# Patient Record
Sex: MALE | HISPANIC OR LATINO | Employment: OTHER
[De-identification: names, ages, dates, MRNs, and addresses within clinical notes are randomized per-mention and may not be internally consistent; named-entity substitution may affect disease eponyms.]

---

## 2017-01-03 ENCOUNTER — CHARTING TRANS (OUTPATIENT)
Dept: OTHER | Age: 67
End: 2017-01-03

## 2017-01-04 ENCOUNTER — CHARTING TRANS (OUTPATIENT)
Dept: OTHER | Age: 67
End: 2017-01-04

## 2017-01-09 ENCOUNTER — LAB SERVICES (OUTPATIENT)
Dept: OTHER | Age: 67
End: 2017-01-09

## 2017-01-09 LAB
DIFFERENTIAL TYPE: ABNORMAL
EST. AVERAGE GLUCOSE BLD GHB EST-MCNC: 242 MG/DL (ref 0–154)
HBA1C MFR BLD: 10.1 % (ref 4.2–6)
HEMATOCRIT: 36.5 % (ref 40–51)
HEMOGLOBIN: 11.8 G/DL (ref 13.7–17.5)
MEAN CORPUSCULAR HGB CONCENTRATION: 32.3 % (ref 32–36)
MEAN CORPUSCULAR HGB: 22.6 PG (ref 27–34)
MEAN CORPUSCULAR VOLUME: 70.1 FL (ref 79–95)
MEAN PLATELET VOLUME: 9.9 FL (ref 8.6–12.4)
PLATELET COUNT: 228 10*3/UL (ref 150–400)
RED BLOOD CELL COUNT: 5.21 10*6/UL (ref 3.9–5.7)
RED CELL DISTRIBUTION WIDTH: 22.1 % (ref 11.3–14.8)
WHITE BLOOD CELL COUNT: 6.2 10*3/UL (ref 4–10)

## 2017-01-10 ENCOUNTER — CHARTING TRANS (OUTPATIENT)
Dept: OTHER | Age: 67
End: 2017-01-10

## 2017-01-10 ENCOUNTER — MYAURORA ACCOUNT LINK (OUTPATIENT)
Dept: OTHER | Age: 67
End: 2017-01-10

## 2017-01-10 ENCOUNTER — CHARTING TRANS (OUTPATIENT)
Dept: URGENT CARE | Age: 67
End: 2017-01-10

## 2017-01-10 ENCOUNTER — IMAGING SERVICES (OUTPATIENT)
Dept: OTHER | Age: 67
End: 2017-01-10

## 2017-01-10 ASSESSMENT — PAIN SCALES - GENERAL: PAINLEVEL_OUTOF10: 10

## 2017-01-16 ENCOUNTER — CHARTING TRANS (OUTPATIENT)
Dept: GASTROENTEROLOGY | Age: 67
End: 2017-01-16

## 2017-01-18 ENCOUNTER — MYAURORA ACCOUNT LINK (OUTPATIENT)
Dept: OTHER | Age: 67
End: 2017-01-18

## 2017-01-18 ENCOUNTER — CHARTING TRANS (OUTPATIENT)
Dept: URGENT CARE | Age: 67
End: 2017-01-18

## 2017-01-18 ENCOUNTER — CHARTING TRANS (OUTPATIENT)
Dept: OTHER | Age: 67
End: 2017-01-18

## 2017-01-18 ENCOUNTER — IMAGING SERVICES (OUTPATIENT)
Dept: OTHER | Age: 67
End: 2017-01-18

## 2017-01-18 ASSESSMENT — PAIN SCALES - GENERAL: PAINLEVEL_OUTOF10: 9

## 2017-01-27 ENCOUNTER — CHARTING TRANS (OUTPATIENT)
Dept: OTHER | Age: 67
End: 2017-01-27

## 2017-01-27 ENCOUNTER — IMAGING SERVICES (OUTPATIENT)
Dept: OTHER | Age: 67
End: 2017-01-27

## 2017-01-27 ENCOUNTER — LAB SERVICES (OUTPATIENT)
Dept: OTHER | Age: 67
End: 2017-01-27

## 2017-01-27 LAB
CREATININE, SERUM: 1 MG/DL (ref 0.4–1.4)
GFR AFRICAN AMERICAN: NORMAL ML/MIN/1.73SQ.M.
GFR NON AFRICAN AMER: >60 ML/MIN/1.73SQ.M.

## 2017-02-01 ENCOUNTER — CHARTING TRANS (OUTPATIENT)
Dept: OTHER | Age: 67
End: 2017-02-01

## 2017-02-01 ENCOUNTER — CHARTING TRANS (OUTPATIENT)
Dept: UROLOGY | Age: 67
End: 2017-02-01

## 2017-02-02 ENCOUNTER — CHARTING TRANS (OUTPATIENT)
Dept: OTHER | Age: 67
End: 2017-02-02

## 2017-02-13 ENCOUNTER — CHARTING TRANS (OUTPATIENT)
Dept: OTHER | Age: 67
End: 2017-02-13

## 2017-02-21 ENCOUNTER — CHARTING TRANS (OUTPATIENT)
Dept: OTHER | Age: 67
End: 2017-02-21

## 2017-02-22 ENCOUNTER — CHARTING TRANS (OUTPATIENT)
Dept: OTHER | Age: 67
End: 2017-02-22

## 2017-02-23 ENCOUNTER — CHARTING TRANS (OUTPATIENT)
Dept: OTHER | Age: 67
End: 2017-02-23

## 2017-02-27 ENCOUNTER — BH HISTORICAL (OUTPATIENT)
Dept: OTHER | Age: 67
End: 2017-02-27

## 2017-02-28 ENCOUNTER — CHARTING TRANS (OUTPATIENT)
Dept: INTERNAL MEDICINE | Age: 67
End: 2017-02-28

## 2017-02-28 ENCOUNTER — CHARTING TRANS (OUTPATIENT)
Dept: OTHER | Age: 67
End: 2017-02-28

## 2017-02-28 ENCOUNTER — LAB SERVICES (OUTPATIENT)
Dept: OTHER | Age: 67
End: 2017-02-28

## 2017-02-28 LAB
BASOPHIL %: 0.3 % (ref 0–1.2)
BASOPHIL ABSOLUTE #: 0 10*3/UL (ref 0–0.1)
BILIRUBIN URINE: NEGATIVE
BLOOD URINE: NEGATIVE
BUN SERPL-MCNC: 11 MG/DL (ref 6–27)
CALCIUM SERPL-MCNC: 9.3 MG/DL (ref 8.6–10.6)
CHLORIDE SERPL-SCNC: 95 MMOL/L (ref 96–107)
CLARITY: CLEAR
COLOR: YELLOW
CREATININE, SERUM: 0.9 MG/DL (ref 0.6–1.6)
DIFFERENTIAL TYPE: ABNORMAL
EOSINOPHIL %: 1.7 % (ref 0–10)
EOSINOPHIL ABSOLUTE #: 0.1 10*3/UL (ref 0–0.5)
GFR SERPL CREATININE-BSD FRML MDRD: >60 ML/MIN/{1.73M2}
GFR SERPL CREATININE-BSD FRML MDRD: >60 ML/MIN/{1.73M2}
GLUCOSE QUALITATIVE U: >=500
GLUCOSE SERPL-MCNC: 281 MG/DL (ref 70–200)
HCO3 SERPL-SCNC: 28 MMOL/L (ref 22–32)
HEMATOCRIT: 32.3 % (ref 40–51)
HEMOGLOBIN: 10.4 G/DL (ref 13.7–17.5)
KETONES, URINE: NEGATIVE
LEUKOCYTE ESTERASE URINE: NEGATIVE
LYMPH PERCENT: 21.5 % (ref 20.5–51.1)
LYMPHOCYTE ABSOLUTE #: 1.4 10*3/UL (ref 1.2–3.4)
MEAN CORPUSCULAR HGB CONCENTRATION: 32.2 % (ref 32–36)
MEAN CORPUSCULAR HGB: 23.2 PG (ref 27–34)
MEAN CORPUSCULAR VOLUME: 71.9 FL (ref 79–95)
MEAN PLATELET VOLUME: 9.9 FL (ref 8.6–12.4)
MONOCYTE ABSOLUTE #: 0.8 10*3/UL (ref 0.2–0.9)
MONOCYTE PERCENT: 11.6 % (ref 4.3–12.9)
NEUTROPHIL ABSOLUTE #: 4.3 10*3/UL (ref 1.4–6.5)
NEUTROPHIL PERCENT: 64.9 % (ref 34–73.5)
NITRITE URINE: NEGATIVE
PH URINE: 8 (ref 5–7)
PLATELET COUNT: 262 10*3/UL (ref 150–400)
POTASSIUM SERPL-SCNC: 4.7 MMOL/L (ref 3.5–5.3)
RED BLOOD CELL COUNT: 4.49 10*6/UL (ref 3.9–5.7)
RED CELL DISTRIBUTION WIDTH: 21.4 % (ref 11.3–14.8)
SODIUM SERPL-SCNC: 131 MMOL/L (ref 136–146)
SPECIFIC GRAVITY URINE: 1.01 (ref 1–1.03)
URINE PROTEIN, QUAL (DIPSTICK): NEGATIVE
UROBILINOGEN URINE: <2
WHITE BLOOD CELL COUNT: 6.6 10*3/UL (ref 4–10)

## 2017-03-01 LAB — FINAL REPORT: NORMAL

## 2017-03-02 ENCOUNTER — CHARTING TRANS (OUTPATIENT)
Dept: OTHER | Age: 67
End: 2017-03-02

## 2017-03-03 ENCOUNTER — CHARTING TRANS (OUTPATIENT)
Dept: OTHER | Age: 67
End: 2017-03-03

## 2017-03-06 ENCOUNTER — CHARTING TRANS (OUTPATIENT)
Dept: OTHER | Age: 67
End: 2017-03-06

## 2017-03-06 ENCOUNTER — LAB SERVICES (OUTPATIENT)
Dept: OTHER | Age: 67
End: 2017-03-06

## 2017-03-06 LAB — BEDSIDE GLUCOSE: 209 MG/DL (ref 70–110)

## 2017-03-07 ENCOUNTER — CHARTING TRANS (OUTPATIENT)
Dept: OTHER | Age: 67
End: 2017-03-07

## 2017-03-08 ENCOUNTER — CHARTING TRANS (OUTPATIENT)
Dept: OTHER | Age: 67
End: 2017-03-08

## 2017-03-10 ENCOUNTER — CHARTING TRANS (OUTPATIENT)
Dept: OTHER | Age: 67
End: 2017-03-10

## 2017-03-13 ENCOUNTER — CHARTING TRANS (OUTPATIENT)
Dept: OTHER | Age: 67
End: 2017-03-13

## 2017-03-14 ENCOUNTER — CHARTING TRANS (OUTPATIENT)
Dept: CARDIOLOGY | Age: 67
End: 2017-03-14

## 2017-03-14 ENCOUNTER — CHARTING TRANS (OUTPATIENT)
Dept: OTHER | Age: 67
End: 2017-03-14

## 2017-03-15 ENCOUNTER — CHARTING TRANS (OUTPATIENT)
Dept: OTHER | Age: 67
End: 2017-03-15

## 2017-03-16 ENCOUNTER — CHARTING TRANS (OUTPATIENT)
Dept: OTHER | Age: 67
End: 2017-03-16

## 2017-03-17 ENCOUNTER — CHARTING TRANS (OUTPATIENT)
Dept: OTHER | Age: 67
End: 2017-03-17

## 2017-03-19 ENCOUNTER — CHARTING TRANS (OUTPATIENT)
Dept: OTHER | Age: 67
End: 2017-03-19

## 2017-03-20 ENCOUNTER — CHARTING TRANS (OUTPATIENT)
Dept: OTHER | Age: 67
End: 2017-03-20

## 2017-03-21 ENCOUNTER — CHARTING TRANS (OUTPATIENT)
Dept: OTHER | Age: 67
End: 2017-03-21

## 2017-03-22 ENCOUNTER — CHARTING TRANS (OUTPATIENT)
Dept: UROLOGY | Age: 67
End: 2017-03-22

## 2017-03-22 ENCOUNTER — CHARTING TRANS (OUTPATIENT)
Dept: OTHER | Age: 67
End: 2017-03-22

## 2017-03-23 ENCOUNTER — BH HISTORICAL (OUTPATIENT)
Dept: OTHER | Age: 67
End: 2017-03-23

## 2017-03-23 ENCOUNTER — CHARTING TRANS (OUTPATIENT)
Dept: OTHER | Age: 67
End: 2017-03-23

## 2017-03-23 ENCOUNTER — LAB SERVICES (OUTPATIENT)
Dept: OTHER | Age: 67
End: 2017-03-23

## 2017-03-23 LAB
ALBUMIN SERPL BCG-MCNC: 3.8 G/DL (ref 3.6–5.1)
ALP SERPL-CCNC: 86 U/L (ref 30–130)
ALT SERPL W/O P-5'-P-CCNC: 25 U/L (ref 10–35)
AST SERPL-CCNC: 12 U/L (ref 9–37)
BILIRUB SERPL-MCNC: 0.2 MG/DL (ref 0–1)
BUN SERPL-MCNC: 13 MG/DL (ref 6–27)
CALCIUM SERPL-MCNC: 9.2 MG/DL (ref 8.6–10.6)
CHLORIDE SERPL-SCNC: 95 MMOL/L (ref 96–107)
CHOLEST SERPL-MCNC: 117 MG/DL (ref 140–200)
CREATININE, SERUM: 0.9 MG/DL (ref 0.6–1.6)
DIFFERENTIAL TYPE: ABNORMAL
EST. AVERAGE GLUCOSE BLD GHB EST-MCNC: 243 MG/DL (ref 0–154)
GFR SERPL CREATININE-BSD FRML MDRD: >60 ML/MIN/{1.73M2}
GFR SERPL CREATININE-BSD FRML MDRD: >60 ML/MIN/{1.73M2}
GLUCOSE P FAST SERPL-MCNC: 242 MG/DL (ref 60–100)
HBA1C MFR BLD: 10.1 % (ref 4.2–6)
HCO3 SERPL-SCNC: 26 MMOL/L (ref 22–32)
HDLC SERPL-MCNC: 77 MG/DL
HEMATOCRIT: 27.7 % (ref 40–51)
HEMOGLOBIN: 8.7 G/DL (ref 13.7–17.5)
LDLC SERPL CALC-MCNC: 14 MG/DL (ref 0–100)
MEAN CORPUSCULAR HGB CONCENTRATION: 31.4 % (ref 32–36)
MEAN CORPUSCULAR HGB: 23 PG (ref 27–34)
MEAN CORPUSCULAR VOLUME: 73.1 FL (ref 79–95)
MEAN PLATELET VOLUME: 9.6 FL (ref 8.6–12.4)
PLATELET COUNT: 300 10*3/UL (ref 150–400)
POTASSIUM SERPL-SCNC: 3.9 MMOL/L (ref 3.5–5.3)
PROT SERPL-MCNC: 6.2 G/DL (ref 6.2–8.1)
RED BLOOD CELL COUNT: 3.79 10*6/UL (ref 3.9–5.7)
RED CELL DISTRIBUTION WIDTH: 20.8 % (ref 11.3–14.8)
SODIUM SERPL-SCNC: 135 MMOL/L (ref 136–146)
TRIGL SERPL-MCNC: 129 MG/DL (ref 0–200)
TSH SERPL DL<=0.05 MIU/L-ACNC: 1.84 M[IU]/L (ref 0.3–4.82)
WHITE BLOOD CELL COUNT: 9 10*3/UL (ref 4–10)

## 2017-03-27 ENCOUNTER — LAB SERVICES (OUTPATIENT)
Dept: OTHER | Age: 67
End: 2017-03-27

## 2017-03-27 ENCOUNTER — CHARTING TRANS (OUTPATIENT)
Dept: OTHER | Age: 67
End: 2017-03-27

## 2017-03-27 ENCOUNTER — CHARTING TRANS (OUTPATIENT)
Dept: ALLERGY | Age: 67
End: 2017-03-27

## 2017-03-27 LAB
A/G RATIO: 1.23 (ref 1.1–2.4)
ALANINE AMINOTRANSFE: 29 U/L (ref 7–52)
ALBUMIN, SERUM (ALB): 3.2 G/DL (ref 3.5–5.7)
ALKALINE PHOSPHATASE (ALK): 78 U/L (ref 34–104)
ANION GAP: 6 MEQ/L (ref 8–16)
ASPARTATE AMINOTRANS: 10 U/L (ref 13–39)
BASOPHIL AUTOMATED: 0.1 %
BASOPHILS: 0 (ref 0–0.2)
BILIRUBIN, TOTAL: 0.4 MG/DL (ref 0.2–0.8)
BLOOD UREA NITROGEN (BUN): 16 MG/DL (ref 7–25)
BUN/CREATININE RATIO: 16.8 (ref 7.4–23)
C3 SERPL-MCNC: 110 MG/DL (ref 88–165)
C4 SERPL-MCNC: 28.4 MG/DL (ref 14–44)
CALCIUM, SERUM: 8.6 MG/DL (ref 8.6–10.3)
CARBON DIOXIDE: 29 MMOL/L (ref 21–31)
CHLORIDE, SERUM: 97 MMOL/L (ref 98–107)
CREATININE: 0.95 MG/DL (ref 0.7–1.3)
EOSINOPHIL AUTOMATED: 0.6 %
EOSINOPHILS: 0 (ref 0–0.5)
EST GLOMERULAR FILTRATION RATE: > 60 1.73M SQ
GLUCOSE P FAST SERPL-MCNC: 330 MG/DL (ref 70–99)
HEMATOCRIT: 28.3 % (ref 38.6–49.2)
HEMOGLOBIN: 8.7 GM/DL (ref 13–17)
K (POTASSIUM, SERUM): 3.8 MMOL/L (ref 3.5–5.1)
LYMPHOCYTE AUTOMATED: 30.2 %
LYMPHOCYTES: 2.3 (ref 0.6–3.4)
MEAN CORPUSCULAR HGB: 23 PG (ref 26–34)
MEAN CORPUSCULAR HGB: 30.7 G/DL (ref 32.5–35.8)
MEAN CORPUSCULAR VOL: 74.8 FL (ref 80–100)
MEAN PLATELET VOLUME: 7.6 FL (ref 6.8–10.2)
MONOCYTE AUTOMATED: 7.9 %
MONOCYTES: 0.6 (ref 0.3–1)
NA (SODIUM, SERUM): 132 MMOL/L (ref 133–144)
NEUTROPHIL ABSOLUTE: 4.7 (ref 1.7–7.7)
NEUTROPHIL AUTOMATED: 61.2 %
PLATELET COUNT: 253 K/MM3 (ref 150–450)
PROTEIN TOTAL: 5.8 G/DL (ref 6.4–8.9)
RED BLOOD CELL COUNT: 3.78 M/MM3 (ref 4.34–5.6)
RED CELL DISTRIBUTIO: 21 % (ref 11.9–15.9)
SEDIMENTATION RATE, RBC: 19 MM/H (ref 0–10)
WHITE BLOOD CELL COU: 7.7 K/MM3 (ref 4–11)

## 2017-03-28 LAB
ANA SER QL IA: NORMAL RATIO (ref 0–0.6)
DSDNA IGG SERPL IA-ACNC: NORMAL [IU]/ML (ref 0–10)

## 2017-03-29 ENCOUNTER — CHARTING TRANS (OUTPATIENT)
Dept: OTHER | Age: 67
End: 2017-03-29

## 2017-03-29 LAB
C1INH SERPL-MCNC: 36 MG/DL (ref 21–39)
THYROGLOB AB SERPL-ACNC: <0.9 IU/ML (ref 0–4)
THYROPEROXIDASE AB SERPL-ACNC: <28 UNITS/ML

## 2017-03-30 ENCOUNTER — IMAGING SERVICES (OUTPATIENT)
Dept: OTHER | Age: 67
End: 2017-03-30

## 2017-03-30 ENCOUNTER — CHARTING TRANS (OUTPATIENT)
Dept: OTHER | Age: 67
End: 2017-03-30

## 2017-03-31 LAB — TRYPTASE SERPL-MCNC: 6.5 UG/L

## 2017-04-02 LAB — C1INH ACT/NOR SERPL: 108 %

## 2017-04-03 ENCOUNTER — CHARTING TRANS (OUTPATIENT)
Dept: OTHER | Age: 67
End: 2017-04-03

## 2017-04-04 ENCOUNTER — CHARTING TRANS (OUTPATIENT)
Dept: OTHER | Age: 67
End: 2017-04-04

## 2017-04-05 ENCOUNTER — LAB SERVICES (OUTPATIENT)
Dept: OTHER | Age: 67
End: 2017-04-05

## 2017-04-05 ENCOUNTER — CHARTING TRANS (OUTPATIENT)
Dept: OTHER | Age: 67
End: 2017-04-05

## 2017-04-05 ENCOUNTER — CHARTING TRANS (OUTPATIENT)
Dept: ALLERGY | Age: 67
End: 2017-04-05

## 2017-04-05 LAB
DIFFERENTIAL TYPE: ABNORMAL
HEMATOCRIT: 29 % (ref 40–51)
HEMOGLOBIN: 9.1 G/DL (ref 13.7–17.5)
IRON SATN MFR SERPL: 11 % (ref 13–59)
IRON SERPL-MCNC: 37 UG/DL (ref 49–181)
MEAN CORPUSCULAR HGB CONCENTRATION: 31.4 % (ref 32–36)
MEAN CORPUSCULAR HGB: 23.9 PG (ref 27–34)
MEAN CORPUSCULAR VOLUME: 76.1 FL (ref 79–95)
MEAN PLATELET VOLUME: 9.1 FL (ref 8.6–12.4)
PLATELET COUNT: 224 10*3/UL (ref 150–400)
RED BLOOD CELL COUNT: 3.81 10*6/UL (ref 3.9–5.7)
RED CELL DISTRIBUTION WIDTH: 23.9 % (ref 11.3–14.8)
RETICULOCYTE # (X 10^6): 0.22 10*6/UL
RETICULOCYTE %: 5.71 % (ref 0.6–2.6)
TIBC SERPL-MCNC: 329 UG/DL (ref 250–450)
WHITE BLOOD CELL COUNT: 7.8 10*3/UL (ref 4–10)

## 2017-04-07 ENCOUNTER — CHARTING TRANS (OUTPATIENT)
Dept: OTHER | Age: 67
End: 2017-04-07

## 2017-04-07 ENCOUNTER — CHARTING TRANS (OUTPATIENT)
Dept: INTERNAL MEDICINE | Age: 67
End: 2017-04-07

## 2017-04-07 LAB
C1Q SERPL-MCNC: 12 UG/ML
HGB A1 MFR BLD ELPH: 97.9 % (ref 96.4–98.2)
HGB A2 MFR BLD ELPH: 2.1 % (ref 1.8–3.4)
HGB C: NORMAL %
HGB F: NORMAL % (ref 0–2)
HGB FRACT BLD ELPH-IMP: NORMAL
HGB S: NORMAL %
OTHER: NORMAL %
PATHOLOGIST NAME: NORMAL

## 2017-04-11 ENCOUNTER — CHARTING TRANS (OUTPATIENT)
Dept: ENDOCRINOLOGY | Age: 67
End: 2017-04-11

## 2017-04-17 ENCOUNTER — CHARTING TRANS (OUTPATIENT)
Dept: OTHER | Age: 67
End: 2017-04-17

## 2017-04-17 ENCOUNTER — CHARTING TRANS (OUTPATIENT)
Dept: HEMATOLOGY/ONCOLOGY | Age: 67
End: 2017-04-17

## 2017-04-17 ASSESSMENT — PAIN SCALES - GENERAL: PAINLEVEL_OUTOF10: 0

## 2017-04-18 ENCOUNTER — IMAGING SERVICES (OUTPATIENT)
Dept: OTHER | Age: 67
End: 2017-04-18

## 2017-04-18 ENCOUNTER — CHARTING TRANS (OUTPATIENT)
Dept: OTHER | Age: 67
End: 2017-04-18

## 2017-04-18 ENCOUNTER — CHARTING TRANS (OUTPATIENT)
Dept: CARDIOLOGY | Age: 67
End: 2017-04-18

## 2017-04-24 ENCOUNTER — CHARTING TRANS (OUTPATIENT)
Dept: OTHER | Age: 67
End: 2017-04-24

## 2017-04-26 ENCOUNTER — CHARTING TRANS (OUTPATIENT)
Dept: ALLERGY | Age: 67
End: 2017-04-26

## 2017-04-26 ENCOUNTER — IMAGING SERVICES (OUTPATIENT)
Dept: OTHER | Age: 67
End: 2017-04-26

## 2017-04-27 ENCOUNTER — CHARTING TRANS (OUTPATIENT)
Dept: OTHER | Age: 67
End: 2017-04-27

## 2017-05-01 ENCOUNTER — CHARTING TRANS (OUTPATIENT)
Dept: OTHER | Age: 67
End: 2017-05-01

## 2017-05-02 ENCOUNTER — CHARTING TRANS (OUTPATIENT)
Dept: OTHER | Age: 67
End: 2017-05-02

## 2017-05-05 ENCOUNTER — CHARTING TRANS (OUTPATIENT)
Dept: ALLERGY | Age: 67
End: 2017-05-05

## 2017-05-05 ENCOUNTER — MYAURORA ACCOUNT LINK (OUTPATIENT)
Dept: OTHER | Age: 67
End: 2017-05-05

## 2017-05-24 ENCOUNTER — CHARTING TRANS (OUTPATIENT)
Dept: OTHER | Age: 67
End: 2017-05-24

## 2017-05-30 ENCOUNTER — CHARTING TRANS (OUTPATIENT)
Dept: OTHER | Age: 67
End: 2017-05-30

## 2017-06-05 ENCOUNTER — CHARTING TRANS (OUTPATIENT)
Dept: OTHER | Age: 67
End: 2017-06-05

## 2017-06-05 ENCOUNTER — MYAURORA ACCOUNT LINK (OUTPATIENT)
Dept: OTHER | Age: 67
End: 2017-06-05

## 2017-06-06 ENCOUNTER — CHARTING TRANS (OUTPATIENT)
Dept: ALLERGY | Age: 67
End: 2017-06-06

## 2017-06-07 ENCOUNTER — CHARTING TRANS (OUTPATIENT)
Dept: OTHER | Age: 67
End: 2017-06-07

## 2017-06-13 ENCOUNTER — CHARTING TRANS (OUTPATIENT)
Dept: OTHER | Age: 67
End: 2017-06-13

## 2017-06-14 ENCOUNTER — CHARTING TRANS (OUTPATIENT)
Dept: OTHER | Age: 67
End: 2017-06-14

## 2017-06-14 ENCOUNTER — LAB SERVICES (OUTPATIENT)
Dept: OTHER | Age: 67
End: 2017-06-14

## 2017-06-14 LAB
DIFFERENTIAL TYPE: ABNORMAL
FERRITIN SERPL-MCNC: 14 NG/ML (ref 18–464)
HEMATOCRIT: 33 % (ref 40–51)
HEMOGLOBIN: 10.4 G/DL (ref 13.7–17.5)
IRON SATN MFR SERPL: 5 % (ref 13–59)
IRON SERPL-MCNC: 18 UG/DL (ref 49–181)
LYMPH PERCENT: 26.5 % (ref 20.5–51.1)
LYMPHOCYTE ABSOLUTE #: 1.5 10*3/UL (ref 1.2–3.4)
MEAN CORPUSCULAR HGB CONCENTRATION: 31.5 % (ref 32–36)
MEAN CORPUSCULAR HGB: 24.1 PG (ref 27–34)
MEAN CORPUSCULAR VOLUME: 76.6 FL (ref 79–95)
MEAN PLATELET VOLUME: 8 FL (ref 8.6–12.4)
MIXED %: 12.4 % (ref 4.3–12.9)
MIXED ABSOLUTE #: 0.7 10*3/UL (ref 0.2–0.9)
NEUTROPHIL ABSOLUTE #: 3.3 10*3/UL (ref 1.4–6.5)
NEUTROPHIL PERCENT: 61.1 % (ref 34–73.5)
PLATELET COUNT: 217 10*3/UL (ref 150–400)
RED BLOOD CELL COUNT: 4.31 10*6/UL (ref 3.9–5.7)
RED CELL DISTRIBUTION WIDTH: 18.1 % (ref 11.3–14.8)
TIBC SERPL-MCNC: 375 UG/DL (ref 250–450)
WHITE BLOOD CELL COUNT: 5.5 10*3/UL (ref 4–10)

## 2017-06-15 ENCOUNTER — CHARTING TRANS (OUTPATIENT)
Dept: OTHER | Age: 67
End: 2017-06-15

## 2017-06-16 ENCOUNTER — CHARTING TRANS (OUTPATIENT)
Dept: OTHER | Age: 67
End: 2017-06-16

## 2017-06-21 ENCOUNTER — CHARTING TRANS (OUTPATIENT)
Dept: UROLOGY | Age: 67
End: 2017-06-21

## 2017-06-22 ENCOUNTER — CHARTING TRANS (OUTPATIENT)
Dept: OTHER | Age: 67
End: 2017-06-22

## 2017-06-23 ENCOUNTER — CHARTING TRANS (OUTPATIENT)
Dept: OTHER | Age: 67
End: 2017-06-23

## 2017-06-26 ENCOUNTER — CHARTING TRANS (OUTPATIENT)
Dept: OTHER | Age: 67
End: 2017-06-26

## 2017-06-26 ENCOUNTER — MYAURORA ACCOUNT LINK (OUTPATIENT)
Dept: OTHER | Age: 67
End: 2017-06-26

## 2017-07-06 ENCOUNTER — CHARTING TRANS (OUTPATIENT)
Dept: OTHER | Age: 67
End: 2017-07-06

## 2017-07-07 ENCOUNTER — CHARTING TRANS (OUTPATIENT)
Dept: OTHER | Age: 67
End: 2017-07-07

## 2017-07-10 ENCOUNTER — LAB SERVICES (OUTPATIENT)
Dept: OTHER | Age: 67
End: 2017-07-10

## 2017-07-10 ENCOUNTER — CHARTING TRANS (OUTPATIENT)
Dept: INTERNAL MEDICINE | Age: 67
End: 2017-07-10

## 2017-07-10 LAB
BASOPHIL %: 0.4 % (ref 0–1.2)
BASOPHIL ABSOLUTE #: 0 10*3/UL (ref 0–0.1)
BILIRUBIN URINE: NEGATIVE
BLOOD URINE: NEGATIVE
CLARITY: CLEAR
COLOR: YELLOW
DIFFERENTIAL TYPE: ABNORMAL
EOSINOPHIL %: 1.4 % (ref 0–10)
EOSINOPHIL ABSOLUTE #: 0.1 10*3/UL (ref 0–0.5)
EST. AVERAGE GLUCOSE BLD GHB EST-MCNC: 182 MG/DL (ref 0–154)
FERRITIN SERPL-MCNC: 16 NG/ML (ref 18–464)
GLUCOSE QUALITATIVE U: 150
HBA1C MFR BLD: 8 % (ref 4.2–6)
HEMATOCRIT: 36.6 % (ref 40–51)
HEMOGLOBIN: 12.2 G/DL (ref 13.7–17.5)
IRON SATN MFR SERPL: 5 % (ref 13–59)
IRON SERPL-MCNC: 19 UG/DL (ref 49–181)
KETONES, URINE: NEGATIVE
LEUKOCYTE ESTERASE URINE: NEGATIVE
LYMPH PERCENT: 26.4 % (ref 20.5–51.1)
LYMPHOCYTE ABSOLUTE #: 1.3 10*3/UL (ref 1.2–3.4)
MEAN CORPUSCULAR HGB CONCENTRATION: 33.3 % (ref 32–36)
MEAN CORPUSCULAR HGB: 24.7 PG (ref 27–34)
MEAN CORPUSCULAR VOLUME: 74.1 FL (ref 79–95)
MEAN PLATELET VOLUME: 9.7 FL (ref 8.6–12.4)
MONOCYTE ABSOLUTE #: 0.6 10*3/UL (ref 0.2–0.9)
MONOCYTE PERCENT: 11.7 % (ref 4.3–12.9)
NEUTROPHIL ABSOLUTE #: 3 10*3/UL (ref 1.4–6.5)
NEUTROPHIL PERCENT: 60.1 % (ref 34–73.5)
NITRITE URINE: NEGATIVE
PH URINE: 7 (ref 5–7)
PLATELET COUNT: 195 10*3/UL (ref 150–400)
RED BLOOD CELL COUNT: 4.94 10*6/UL (ref 3.9–5.7)
RED CELL DISTRIBUTION WIDTH: 20.2 % (ref 11.3–14.8)
SPECIFIC GRAVITY URINE: 1.01 (ref 1–1.03)
TIBC SERPL-MCNC: 360 UG/DL (ref 250–450)
URINE PROTEIN, QUAL (DIPSTICK): NEGATIVE
UROBILINOGEN URINE: <2
WHITE BLOOD CELL COUNT: 5 10*3/UL (ref 4–10)

## 2017-07-11 ENCOUNTER — CHARTING TRANS (OUTPATIENT)
Dept: OTHER | Age: 67
End: 2017-07-11

## 2017-07-11 ENCOUNTER — IMAGING SERVICES (OUTPATIENT)
Dept: OTHER | Age: 67
End: 2017-07-11

## 2017-07-11 LAB
ALBUMIN/CREAT UR: 14.6 MG/G (ref 0–30)
CREAT UR-MCNC: 32.6 MG/DL
MICROALBUMIN UR-MCNC: 4.7 MG/L

## 2017-07-12 ENCOUNTER — CHARTING TRANS (OUTPATIENT)
Dept: OTHER | Age: 67
End: 2017-07-12

## 2017-07-12 LAB — FINAL REPORT: NORMAL

## 2017-07-13 ENCOUNTER — CHARTING TRANS (OUTPATIENT)
Dept: OTHER | Age: 67
End: 2017-07-13

## 2017-07-18 ENCOUNTER — CHARTING TRANS (OUTPATIENT)
Dept: OTHER | Age: 67
End: 2017-07-18

## 2017-07-19 ENCOUNTER — IMAGING SERVICES (OUTPATIENT)
Dept: OTHER | Age: 67
End: 2017-07-19

## 2017-07-26 ENCOUNTER — CHARTING TRANS (OUTPATIENT)
Dept: UROLOGY | Age: 67
End: 2017-07-26

## 2017-07-27 ENCOUNTER — CHARTING TRANS (OUTPATIENT)
Dept: OTHER | Age: 67
End: 2017-07-27

## 2017-07-31 ENCOUNTER — LAB SERVICES (OUTPATIENT)
Dept: OTHER | Age: 67
End: 2017-07-31

## 2017-07-31 ENCOUNTER — MYAURORA ACCOUNT LINK (OUTPATIENT)
Dept: OTHER | Age: 67
End: 2017-07-31

## 2017-07-31 ENCOUNTER — CHARTING TRANS (OUTPATIENT)
Dept: HEMATOLOGY/ONCOLOGY | Age: 67
End: 2017-07-31

## 2017-07-31 ENCOUNTER — CHARTING TRANS (OUTPATIENT)
Dept: ENDOCRINOLOGY | Age: 67
End: 2017-07-31

## 2017-07-31 LAB
ALBUMIN SERPL BCG-MCNC: 4.1 G/DL (ref 3.6–5.1)
ALP SERPL-CCNC: 92 U/L (ref 30–130)
ALT SERPL W/O P-5'-P-CCNC: 21 U/L (ref 10–35)
AST SERPL-CCNC: 17 U/L (ref 9–37)
BILIRUB SERPL-MCNC: 0.3 MG/DL (ref 0–1)
BUN SERPL-MCNC: 10 MG/DL (ref 6–27)
CALCIUM SERPL-MCNC: 9.8 MG/DL (ref 8.6–10.6)
CHLORIDE SERPL-SCNC: 99 MMOL/L (ref 96–107)
CREATININE, SERUM: 0.9 MG/DL (ref 0.6–1.6)
DIFFERENTIAL TYPE: ABNORMAL
FERRITIN SERPL-MCNC: 17 NG/ML (ref 18–464)
FOLATE SERPL-MCNC: >20 NG/ML (ref 3–17)
GFR SERPL CREATININE-BSD FRML MDRD: >60 ML/MIN/{1.73M2}
GFR SERPL CREATININE-BSD FRML MDRD: >60 ML/MIN/{1.73M2}
GLUCOSE SERPL-MCNC: 158 MG/DL (ref 70–200)
HCO3 SERPL-SCNC: 27 MMOL/L (ref 22–32)
HEMATOCRIT: 39.3 % (ref 40–51)
HEMOGLOBIN: 12.7 G/DL (ref 13.7–17.5)
LYMPH PERCENT: 25 % (ref 20.5–51.1)
LYMPHOCYTE ABSOLUTE #: 1.2 10*3/UL (ref 1.2–3.4)
MEAN CORPUSCULAR HGB CONCENTRATION: 32.3 % (ref 32–36)
MEAN CORPUSCULAR HGB: 25.1 PG (ref 27–34)
MEAN CORPUSCULAR VOLUME: 77.7 FL (ref 79–95)
MEAN PLATELET VOLUME: 8.3 FL (ref 8.6–12.4)
MIXED %: 12.4 % (ref 4.3–12.9)
MIXED ABSOLUTE #: 0.6 10*3/UL (ref 0.2–0.9)
NEUTROPHIL ABSOLUTE #: 3.1 10*3/UL (ref 1.4–6.5)
NEUTROPHIL PERCENT: 62.6 % (ref 34–73.5)
PLATELET COUNT: 190 10*3/UL (ref 150–400)
POTASSIUM SERPL-SCNC: 4.2 MMOL/L (ref 3.5–5.3)
PROT SERPL-MCNC: 7 G/DL (ref 6.2–8.1)
RED BLOOD CELL COUNT: 5.06 10*6/UL (ref 3.9–5.7)
RED CELL DISTRIBUTION WIDTH: 21.1 % (ref 11.3–14.8)
RETICULOCYTE # (X 10^6): 0.08 10*6/UL
RETICULOCYTE %: 1.57 % (ref 0.6–2.6)
SODIUM SERPL-SCNC: 134 MMOL/L (ref 136–146)
VIT B12 SERPL-MCNC: 343 PG/ML (ref 193–982)
WHITE BLOOD CELL COUNT: 4.9 10*3/UL (ref 4–10)

## 2017-07-31 ASSESSMENT — PAIN SCALES - GENERAL: PAINLEVEL_OUTOF10: 6

## 2017-08-02 LAB — HAPTOGLOB SERPL-MCNC: 156 MG/DL (ref 36–195)

## 2017-08-07 ENCOUNTER — CHARTING TRANS (OUTPATIENT)
Dept: OTHER | Age: 67
End: 2017-08-07

## 2017-08-07 ENCOUNTER — CHARTING TRANS (OUTPATIENT)
Dept: GASTROENTEROLOGY | Age: 67
End: 2017-08-07

## 2017-08-09 ENCOUNTER — CHARTING TRANS (OUTPATIENT)
Dept: OTHER | Age: 67
End: 2017-08-09

## 2017-08-11 ENCOUNTER — CHARTING TRANS (OUTPATIENT)
Dept: CARDIOLOGY | Age: 67
End: 2017-08-11

## 2017-08-11 ENCOUNTER — CHARTING TRANS (OUTPATIENT)
Dept: OTHER | Age: 67
End: 2017-08-11

## 2017-08-14 ENCOUNTER — CHARTING TRANS (OUTPATIENT)
Dept: OTHER | Age: 67
End: 2017-08-14

## 2017-08-15 ENCOUNTER — CHARTING TRANS (OUTPATIENT)
Dept: OTHER | Age: 67
End: 2017-08-15

## 2017-08-16 ENCOUNTER — CHARTING TRANS (OUTPATIENT)
Dept: OTHER | Age: 67
End: 2017-08-16

## 2017-08-24 ENCOUNTER — CHARTING TRANS (OUTPATIENT)
Dept: OTHER | Age: 67
End: 2017-08-24

## 2017-08-28 ENCOUNTER — CHARTING TRANS (OUTPATIENT)
Dept: OTHER | Age: 67
End: 2017-08-28

## 2017-09-07 ENCOUNTER — CHARTING TRANS (OUTPATIENT)
Dept: OTHER | Age: 67
End: 2017-09-07

## 2017-09-07 ENCOUNTER — HOSPITAL ENCOUNTER (EMERGENCY)
Facility: HOSPITAL | Age: 67
Discharge: HOME | End: 2017-09-07
Attending: EMERGENCY MEDICINE
Payer: MEDICARE

## 2017-09-07 ENCOUNTER — APPOINTMENT (OUTPATIENT)
Dept: ULTRASOUND IMAGING | Facility: HOSPITAL | Age: 67
End: 2017-09-07
Attending: EMERGENCY MEDICINE
Payer: MEDICARE

## 2017-09-07 ENCOUNTER — APPOINTMENT (OUTPATIENT)
Dept: GENERAL RADIOLOGY | Facility: HOSPITAL | Age: 67
End: 2017-09-07
Attending: EMERGENCY MEDICINE
Payer: MEDICARE

## 2017-09-07 VITALS
RESPIRATION RATE: 18 BRPM | WEIGHT: 195 LBS | BODY MASS INDEX: 31 KG/M2 | SYSTOLIC BLOOD PRESSURE: 125 MMHG | TEMPERATURE: 98 F | OXYGEN SATURATION: 98 % | DIASTOLIC BLOOD PRESSURE: 76 MMHG | HEART RATE: 64 BPM

## 2017-09-07 DIAGNOSIS — R06.00 DYSPNEA, UNSPECIFIED TYPE: ICD-10-CM

## 2017-09-07 DIAGNOSIS — R60.0 PEDAL EDEMA: Primary | ICD-10-CM

## 2017-09-07 LAB
ALBUMIN SERPL-MCNC: 3.4 G/DL (ref 3.5–4.8)
ALP LIVER SERPL-CCNC: 95 U/L (ref 45–117)
ALT SERPL-CCNC: 28 U/L (ref 17–63)
AST SERPL-CCNC: 15 U/L (ref 15–41)
ATRIAL RATE: 70 BPM
BASOPHILS # BLD AUTO: 0.02 X10(3) UL (ref 0–0.1)
BASOPHILS NFR BLD AUTO: 0.4 %
BILIRUB SERPL-MCNC: 0.5 MG/DL (ref 0.1–2)
BILIRUB UR QL STRIP.AUTO: NEGATIVE
BUN BLD-MCNC: 9 MG/DL (ref 8–20)
CALCIUM BLD-MCNC: 8.6 MG/DL (ref 8.3–10.3)
CHLORIDE: 101 MMOL/L (ref 101–111)
CLARITY UR REFRACT.AUTO: CLEAR
CO2: 25 MMOL/L (ref 22–32)
COLOR UR AUTO: YELLOW
CREAT BLD-MCNC: 1.14 MG/DL (ref 0.7–1.3)
EOSINOPHIL # BLD AUTO: 0.06 X10(3) UL (ref 0–0.3)
EOSINOPHIL NFR BLD AUTO: 1.1 %
ERYTHROCYTE [DISTWIDTH] IN BLOOD BY AUTOMATED COUNT: 21.6 % (ref 11.5–16)
GLUCOSE BLD-MCNC: 136 MG/DL (ref 70–99)
GLUCOSE UR STRIP.AUTO-MCNC: 50 MG/DL
HCT VFR BLD AUTO: 39.2 % (ref 37–53)
HGB BLD-MCNC: 13.1 G/DL (ref 13–17)
IMMATURE GRANULOCYTE COUNT: 0.08 X10(3) UL (ref 0–1)
IMMATURE GRANULOCYTE RATIO %: 1.5 %
KETONES UR STRIP.AUTO-MCNC: NEGATIVE MG/DL
LEUKOCYTE ESTERASE UR QL STRIP.AUTO: NEGATIVE
LYMPHOCYTES # BLD AUTO: 1.46 X10(3) UL (ref 0.9–4)
LYMPHOCYTES NFR BLD AUTO: 27.7 %
M PROTEIN MFR SERPL ELPH: 6.9 G/DL (ref 6.1–8.3)
MCH RBC QN AUTO: 26.6 PG (ref 27–33.2)
MCHC RBC AUTO-ENTMCNC: 33.4 G/DL (ref 31–37)
MCV RBC AUTO: 79.5 FL (ref 80–99)
MONOCYTES # BLD AUTO: 0.71 X10(3) UL (ref 0.1–0.6)
MONOCYTES NFR BLD AUTO: 13.5 %
NEUTROPHIL ABS PRELIM: 2.94 X10 (3) UL (ref 1.3–6.7)
NEUTROPHILS # BLD AUTO: 2.94 X10(3) UL (ref 1.3–6.7)
NEUTROPHILS NFR BLD AUTO: 55.8 %
NITRITE UR QL STRIP.AUTO: NEGATIVE
P AXIS: 51 DEGREES
P-R INTERVAL: 164 MS
PH UR STRIP.AUTO: 7 [PH] (ref 4.5–8)
PLATELET # BLD AUTO: 165 10(3)UL (ref 150–450)
POTASSIUM SERPL-SCNC: 4.2 MMOL/L (ref 3.6–5.1)
PRO-BETA NATRIURETIC PEPTIDE: 116 PG/ML (ref ?–125)
PROT UR STRIP.AUTO-MCNC: NEGATIVE MG/DL
Q-T INTERVAL: 422 MS
QRS DURATION: 90 MS
QTC CALCULATION (BEZET): 455 MS
R AXIS: 90 DEGREES
RBC # BLD AUTO: 4.93 X10(6)UL (ref 3.8–5.8)
RBC UR QL AUTO: NEGATIVE
RED CELL DISTRIBUTION WIDTH-SD: 60.6 FL (ref 35.1–46.3)
SODIUM SERPL-SCNC: 134 MMOL/L (ref 136–144)
SP GR UR STRIP.AUTO: 1.01 (ref 1–1.03)
T AXIS: 70 DEGREES
TROPONIN: <0.046 NG/ML (ref ?–0.05)
UROBILINOGEN UR STRIP.AUTO-MCNC: <2 MG/DL
VENTRICULAR RATE: 70 BPM
WBC # BLD AUTO: 5.3 X10(3) UL (ref 4–13)

## 2017-09-07 PROCEDURE — 93010 ELECTROCARDIOGRAM REPORT: CPT

## 2017-09-07 PROCEDURE — 99285 EMERGENCY DEPT VISIT HI MDM: CPT

## 2017-09-07 PROCEDURE — 93005 ELECTROCARDIOGRAM TRACING: CPT

## 2017-09-07 PROCEDURE — 81003 URINALYSIS AUTO W/O SCOPE: CPT | Performed by: EMERGENCY MEDICINE

## 2017-09-07 PROCEDURE — 84484 ASSAY OF TROPONIN QUANT: CPT | Performed by: EMERGENCY MEDICINE

## 2017-09-07 PROCEDURE — 83880 ASSAY OF NATRIURETIC PEPTIDE: CPT | Performed by: EMERGENCY MEDICINE

## 2017-09-07 PROCEDURE — 85025 COMPLETE CBC W/AUTO DIFF WBC: CPT | Performed by: EMERGENCY MEDICINE

## 2017-09-07 PROCEDURE — 80053 COMPREHEN METABOLIC PANEL: CPT | Performed by: EMERGENCY MEDICINE

## 2017-09-07 PROCEDURE — 96374 THER/PROPH/DIAG INJ IV PUSH: CPT

## 2017-09-07 PROCEDURE — 71010 XR CHEST AP PORTABLE  (CPT=71010): CPT | Performed by: EMERGENCY MEDICINE

## 2017-09-07 PROCEDURE — 93970 EXTREMITY STUDY: CPT | Performed by: EMERGENCY MEDICINE

## 2017-09-07 RX ORDER — CETIRIZINE HYDROCHLORIDE 10 MG/1
10 TABLET ORAL DAILY
COMMUNITY
End: 2018-12-13

## 2017-09-07 RX ORDER — FUROSEMIDE 10 MG/ML
20 INJECTION INTRAMUSCULAR; INTRAVENOUS ONCE
Status: COMPLETED | OUTPATIENT
Start: 2017-09-07 | End: 2017-09-07

## 2017-09-07 RX ORDER — RANOLAZINE 1000 MG/1
1000 TABLET, EXTENDED RELEASE ORAL 2 TIMES DAILY
COMMUNITY
End: 2018-11-02

## 2017-09-07 RX ORDER — ATORVASTATIN CALCIUM 20 MG/1
10 TABLET, FILM COATED ORAL NIGHTLY
COMMUNITY
End: 2019-01-07

## 2017-09-07 RX ORDER — MONTELUKAST SODIUM 10 MG/1
10 TABLET ORAL NIGHTLY
COMMUNITY
End: 2019-08-15

## 2017-09-07 RX ORDER — METOCLOPRAMIDE 10 MG/1
10 TABLET ORAL
COMMUNITY
End: 2019-08-15

## 2017-09-07 RX ORDER — FUROSEMIDE 20 MG/1
20 TABLET ORAL DAILY
Qty: 3 TABLET | Refills: 0 | Status: SHIPPED | OUTPATIENT
Start: 2017-09-07 | End: 2017-09-10

## 2017-09-07 NOTE — ED INITIAL ASSESSMENT (HPI)
C/o bilat lower ext edema for several days. Also, c/o fatigue, some SOB when laying down. Denies chest pain.

## 2017-09-07 NOTE — ED PROVIDER NOTES
Patient Seen in: [facility] Emergency Department    History   Patient presents with:  Swelling Edema (cardiovascular, metabolic)    Stated Complaint: swelling    HPI    57-year-old male complaining of swollen legs.   This patient has a history of hea Physical Exam    Patient is alert orient ×3 no acute distress HEENT exam within normal limits neck is no lymphadenopathy JVD lungs are clear cardiovascular exam shows regular rate and rhythm without murmurs abdomen soft there is some mild suprapubi labs are drawn the patient's CBC unremarkable chemistry shows albumin 3.4 sodium 134. Chest x-ray shows no pneumonia or CHF. Chest x-ray labs and ultrasound were unremarkable.   The patient did complain of feeling of shortness of breath when he wakens s

## 2017-09-08 ENCOUNTER — CHARTING TRANS (OUTPATIENT)
Dept: OTHER | Age: 67
End: 2017-09-08

## 2017-09-14 ENCOUNTER — CHARTING TRANS (OUTPATIENT)
Dept: OTHER | Age: 67
End: 2017-09-14

## 2017-09-27 ENCOUNTER — CHARTING TRANS (OUTPATIENT)
Dept: OTHER | Age: 67
End: 2017-09-27

## 2017-09-28 ENCOUNTER — CHARTING TRANS (OUTPATIENT)
Dept: OTHER | Age: 67
End: 2017-09-28

## 2017-10-03 ENCOUNTER — LAB SERVICES (OUTPATIENT)
Dept: OTHER | Age: 67
End: 2017-10-03

## 2017-10-03 LAB
BASOPHIL AUTOMATED: 0.2 %
BASOPHILS: 0 (ref 0–0.2)
EOSINOPHIL AUTOMATED: 1.3 %
EOSINOPHILS: 0.1 (ref 0–0.5)
HEMATOCRIT: 40.7 % (ref 38.6–49.2)
HEMOGLOBIN: 13.3 GM/DL (ref 13–17)
LYMPHOCYTE AUTOMATED: 23 %
LYMPHOCYTES: 1.8 (ref 0.6–3.4)
MEAN CORPUSCULAR HGB: 27 PG (ref 26–34)
MEAN CORPUSCULAR HGB: 32.6 G/DL (ref 32.5–35.8)
MEAN CORPUSCULAR VOL: 82.9 FL (ref 80–100)
MEAN PLATELET VOLUME: 8.2 FL (ref 6.8–10.2)
MONOCYTE AUTOMATED: 8.3 %
MONOCYTES: 0.7 (ref 0.3–1)
NEUTROPHIL ABSOLUTE: 5.3 (ref 1.7–7.7)
NEUTROPHIL AUTOMATED: 67.2 %
PLATELET COUNT: 170 K/MM3 (ref 150–450)
RED BLOOD CELL COUNT: 4.91 M/MM3 (ref 4.34–5.6)
RED CELL DISTRIBUTIO: 21.1 % (ref 11.9–15.9)
WHITE BLOOD CELL COU: 7.9 K/MM3 (ref 4–11)

## 2017-10-04 ENCOUNTER — LAB SERVICES (OUTPATIENT)
Dept: OTHER | Age: 67
End: 2017-10-04

## 2017-10-04 ENCOUNTER — CHARTING TRANS (OUTPATIENT)
Dept: OTHER | Age: 67
End: 2017-10-04

## 2017-10-04 ENCOUNTER — IMAGING SERVICES (OUTPATIENT)
Dept: OTHER | Age: 67
End: 2017-10-04

## 2017-10-04 LAB
APPEARANCE: CLEAR
BILIRUBIN: ABNORMAL
COLOR: YELLOW
GLUCOSE, URINE, AUTOMATED: ABNORMAL MG/DL
KETONES, URINE, AUTOMATED: ABNORMAL MG/DL
LEUKOCYTE, URINE, AUTOMATED: ABNORMAL
NITRITE, URINE AUTOMATED: NEGATIVE
PH, URINE: 6 (ref 5–8)
PROTEIN, URINE: ABNORMAL MG/DL
SPECIFIC GRAVITY UA: 1.01 (ref 1–1.03)
URINE, BLOOD, AUTOMATED: ABNORMAL
UROBILINOGEN, URINE, AUTOMATED: 2 MG/DL

## 2017-10-09 ENCOUNTER — CHARTING TRANS (OUTPATIENT)
Dept: OTHER | Age: 67
End: 2017-10-09

## 2017-10-10 ENCOUNTER — CHARTING TRANS (OUTPATIENT)
Dept: OTHER | Age: 67
End: 2017-10-10

## 2017-10-12 ENCOUNTER — CHARTING TRANS (OUTPATIENT)
Dept: INTERNAL MEDICINE | Age: 67
End: 2017-10-12

## 2017-10-13 ENCOUNTER — CHARTING TRANS (OUTPATIENT)
Dept: OTHER | Age: 67
End: 2017-10-13

## 2017-10-16 ENCOUNTER — LAB SERVICES (OUTPATIENT)
Dept: OTHER | Age: 67
End: 2017-10-16

## 2017-10-16 LAB
ALBUMIN SERPL BCG-MCNC: 4.3 G/DL (ref 3.6–5.1)
ALP SERPL-CCNC: 83 U/L (ref 30–130)
ALT SERPL W/O P-5'-P-CCNC: 19 U/L (ref 10–35)
AST SERPL-CCNC: 17 U/L (ref 9–37)
BILIRUB SERPL-MCNC: 0.4 MG/DL (ref 0–1)
BUN SERPL-MCNC: 10 MG/DL (ref 6–27)
CALCIUM SERPL-MCNC: 9.7 MG/DL (ref 8.6–10.6)
CHLORIDE SERPL-SCNC: 99 MMOL/L (ref 96–107)
CHOLEST SERPL-MCNC: 123 MG/DL (ref 140–200)
CREATININE, SERUM: 1.1 MG/DL (ref 0.6–1.6)
EST. AVERAGE GLUCOSE BLD GHB EST-MCNC: 164 MG/DL (ref 0–154)
GFR SERPL CREATININE-BSD FRML MDRD: >60 ML/MIN/{1.73M2}
GFR SERPL CREATININE-BSD FRML MDRD: >60 ML/MIN/{1.73M2}
GLUCOSE SERPL-MCNC: 103 MG/DL (ref 70–200)
HBA1C MFR BLD: 7.3 % (ref 4.2–6)
HCO3 SERPL-SCNC: 24 MMOL/L (ref 22–32)
HDLC SERPL-MCNC: 52 MG/DL
LDLC SERPL CALC-MCNC: 44 MG/DL (ref 0–100)
POTASSIUM SERPL-SCNC: 4.6 MMOL/L (ref 3.5–5.3)
PROT SERPL-MCNC: 6.9 G/DL (ref 6.2–8.1)
SODIUM SERPL-SCNC: 136 MMOL/L (ref 136–146)
TRIGL SERPL-MCNC: 136 MG/DL (ref 0–200)

## 2017-10-17 ENCOUNTER — CHARTING TRANS (OUTPATIENT)
Dept: OTHER | Age: 67
End: 2017-10-17

## 2017-10-17 ENCOUNTER — IMAGING SERVICES (OUTPATIENT)
Dept: OTHER | Age: 67
End: 2017-10-17

## 2017-10-17 LAB
DIFFERENTIAL TYPE: ABNORMAL
HEMATOCRIT: 44.1 % (ref 40–51)
HEMOGLOBIN: 14.8 G/DL (ref 13.7–17.5)
MEAN CORPUSCULAR HGB CONCENTRATION: 33.6 % (ref 32–36)
MEAN CORPUSCULAR HGB: 27.4 PG (ref 27–34)
MEAN CORPUSCULAR VOLUME: 81.5 FL (ref 79–95)
MEAN PLATELET VOLUME: 10.5 FL (ref 8.6–12.4)
PLATELET COUNT: 168 10*3/UL (ref 150–400)
RED BLOOD CELL COUNT: 5.41 10*6/UL (ref 3.9–5.7)
RED CELL DISTRIBUTION WIDTH: 18.8 % (ref 11.3–14.8)
WHITE BLOOD CELL COUNT: 5.9 10*3/UL (ref 4–10)

## 2017-10-18 ENCOUNTER — CHARTING TRANS (OUTPATIENT)
Dept: OTHER | Age: 67
End: 2017-10-18

## 2017-10-25 ENCOUNTER — CHARTING TRANS (OUTPATIENT)
Dept: OTHER | Age: 67
End: 2017-10-25

## 2017-10-31 ENCOUNTER — LAB SERVICES (OUTPATIENT)
Dept: OTHER | Age: 67
End: 2017-10-31

## 2017-10-31 LAB
DIFFERENTIAL TYPE: ABNORMAL
FERRITIN SERPL-MCNC: 25 NG/ML (ref 18–464)
HEMATOCRIT: 41.8 % (ref 40–51)
HEMOGLOBIN: 14.3 G/DL (ref 13.7–17.5)
IRON SATN MFR SERPL: 13 % (ref 13–59)
IRON SERPL-MCNC: 46 UG/DL (ref 49–181)
LYMPH PERCENT: 24.6 % (ref 20.5–51.1)
LYMPHOCYTE ABSOLUTE #: 1.5 10*3/UL (ref 1.2–3.4)
MEAN CORPUSCULAR HGB CONCENTRATION: 34.2 % (ref 32–36)
MEAN CORPUSCULAR HGB: 28.1 PG (ref 27–34)
MEAN CORPUSCULAR VOLUME: 82.3 FL (ref 79–95)
MEAN PLATELET VOLUME: 9.4 FL (ref 8.6–12.4)
MIXED %: 11.5 % (ref 4.3–12.9)
MIXED ABSOLUTE #: 0.7 10*3/UL (ref 0.2–0.9)
NEUTROPHIL ABSOLUTE #: 3.8 10*3/UL (ref 1.4–6.5)
NEUTROPHIL PERCENT: 63.9 % (ref 34–73.5)
PLATELET COUNT: 170 10*3/UL (ref 150–400)
RED BLOOD CELL COUNT: 5.08 10*6/UL (ref 3.9–5.7)
RED CELL DISTRIBUTION WIDTH: 17.4 % (ref 11.3–14.8)
TIBC SERPL-MCNC: 356 UG/DL (ref 250–450)
WHITE BLOOD CELL COUNT: 6 10*3/UL (ref 4–10)

## 2017-11-01 ENCOUNTER — CHARTING TRANS (OUTPATIENT)
Dept: ALLERGY | Age: 67
End: 2017-11-01

## 2017-11-01 ENCOUNTER — MYAURORA ACCOUNT LINK (OUTPATIENT)
Dept: OTHER | Age: 67
End: 2017-11-01

## 2017-11-07 ENCOUNTER — CHARTING TRANS (OUTPATIENT)
Dept: OTHER | Age: 67
End: 2017-11-07

## 2017-11-07 ENCOUNTER — CHARTING TRANS (OUTPATIENT)
Dept: ENDOCRINOLOGY | Age: 67
End: 2017-11-07

## 2017-11-09 ENCOUNTER — CHARTING TRANS (OUTPATIENT)
Dept: OTHER | Age: 67
End: 2017-11-09

## 2017-11-09 ENCOUNTER — CHARTING TRANS (OUTPATIENT)
Dept: HEMATOLOGY/ONCOLOGY | Age: 67
End: 2017-11-09

## 2017-11-09 ENCOUNTER — MYAURORA ACCOUNT LINK (OUTPATIENT)
Dept: OTHER | Age: 67
End: 2017-11-09

## 2017-11-09 ASSESSMENT — PAIN SCALES - GENERAL: PAINLEVEL_OUTOF10: 0

## 2017-11-13 ENCOUNTER — CHARTING TRANS (OUTPATIENT)
Dept: OTHER | Age: 67
End: 2017-11-13

## 2017-11-13 ENCOUNTER — LAB SERVICES (OUTPATIENT)
Dept: OTHER | Age: 67
End: 2017-11-13

## 2017-11-13 LAB
CREATININE: 1.01 MG/DL (ref 0.7–1.3)
EST GLOMERULAR FILTRATION RATE: > 60 1.73M SQ

## 2017-11-20 ENCOUNTER — MYAURORA ACCOUNT LINK (OUTPATIENT)
Dept: OTHER | Age: 67
End: 2017-11-20

## 2017-11-20 ENCOUNTER — CHARTING TRANS (OUTPATIENT)
Dept: GASTROENTEROLOGY | Age: 67
End: 2017-11-20

## 2017-11-21 ENCOUNTER — CHARTING TRANS (OUTPATIENT)
Dept: OTHER | Age: 67
End: 2017-11-21

## 2017-11-22 ENCOUNTER — LAB SERVICES (OUTPATIENT)
Dept: OTHER | Age: 67
End: 2017-11-22

## 2017-11-22 ENCOUNTER — CHARTING TRANS (OUTPATIENT)
Dept: OTHER | Age: 67
End: 2017-11-22

## 2017-11-22 ENCOUNTER — IMAGING SERVICES (OUTPATIENT)
Dept: OTHER | Age: 67
End: 2017-11-22

## 2017-11-22 ENCOUNTER — CHARTING TRANS (OUTPATIENT)
Dept: CARDIOLOGY | Age: 67
End: 2017-11-22

## 2017-11-22 LAB
BUN SERPL-MCNC: 10 MG/DL (ref 6–27)
CALCIUM SERPL-MCNC: 9.3 MG/DL (ref 8.6–10.6)
CHLORIDE SERPL-SCNC: 96 MMOL/L (ref 96–107)
CREATININE, SERUM: 1 MG/DL (ref 0.6–1.6)
GFR SERPL CREATININE-BSD FRML MDRD: >60 ML/MIN/{1.73M2}
GFR SERPL CREATININE-BSD FRML MDRD: >60 ML/MIN/{1.73M2}
GLUCOSE SERPL-MCNC: 191 MG/DL (ref 70–200)
HCO3 SERPL-SCNC: 27 MMOL/L (ref 22–32)
INTERNATIONAL NORMALIZED RATIO: 1
MAGNESIUM SERPL-MCNC: 2 MG/DL (ref 1.6–2.6)
POTASSIUM SERPL-SCNC: 4.4 MMOL/L (ref 3.5–5.3)
PROTHROMBIN TIME: 9.9 S (ref 9.5–11.5)
SEND OUT RESULTS: NORMAL
SODIUM SERPL-SCNC: 133 MMOL/L (ref 136–146)

## 2017-11-27 ENCOUNTER — LAB SERVICES (OUTPATIENT)
Dept: OTHER | Age: 67
End: 2017-11-27

## 2017-11-27 ENCOUNTER — CHARTING TRANS (OUTPATIENT)
Dept: OTHER | Age: 67
End: 2017-11-27

## 2017-11-27 ENCOUNTER — MYAURORA ACCOUNT LINK (OUTPATIENT)
Dept: OTHER | Age: 67
End: 2017-11-27

## 2017-11-27 LAB
A/G RATIO: 1.65 (ref 1.1–2.4)
ALANINE AMINOTRANSFE: 14 U/L (ref 7–52)
ALBUMIN, SERUM (ALB): 3.8 G/DL (ref 3.5–5.7)
ALKALINE PHOSPHATASE (ALK): 51 U/L (ref 34–104)
ANION GAP: 5 MEQ/L (ref 8–16)
APPEARANCE: ABNORMAL
ASPARTATE AMINOTRANS: 15 U/L (ref 13–39)
BACTERIA: ABNORMAL /HPF
BASOPHIL AUTOMATED: 0.3 %
BASOPHILS: 0 (ref 0–0.2)
BILIRUBIN, TOTAL: 1 MG/DL (ref 0.2–0.8)
BILIRUBIN: ABNORMAL
BLOOD UREA NITROGEN (BUN): 10 MG/DL (ref 7–25)
BUN/CREATININE RATIO: 10.1 (ref 7.4–23)
CALCIUM, SERUM: 8.7 MG/DL (ref 8.6–10.3)
CARBON DIOXIDE: 25 MMOL/L (ref 21–31)
CHLORIDE, SERUM: 100 MMOL/L (ref 98–107)
COLOR: ABNORMAL
CREATININE: 0.99 MG/DL (ref 0.7–1.3)
EOSINOPHIL AUTOMATED: 0.8 %
EOSINOPHILS: 0.1 (ref 0–0.5)
EST GLOMERULAR FILTRATION RATE: > 60 1.73M SQ
GLUCOSE P FAST SERPL-MCNC: 121 MG/DL (ref 70–99)
GLUCOSE, URINE, AUTOMATED: ABNORMAL MG/DL
HEMATOCRIT: 38.1 % (ref 38.6–49.2)
HEMOGLOBIN: 12.8 GM/DL (ref 13–17)
INTERNATIONAL NORMAL: 1.1 (ref 0.8–1.1)
K (POTASSIUM, SERUM): 3.9 MMOL/L (ref 3.5–5.1)
KETONES, URINE, AUTOMATED: ABNORMAL MG/DL
LEUKOCYTE, URINE, AUTOMATED: ABNORMAL
LYMPHOCYTE AUTOMATED: 12.2 %
LYMPHOCYTES: 1 (ref 0.6–3.4)
MEAN CORPUSCULAR HGB: 28.7 PG (ref 26–34)
MEAN CORPUSCULAR HGB: 33.5 G/DL (ref 32.5–35.8)
MEAN CORPUSCULAR VOL: 85.8 FL (ref 80–100)
MEAN PLATELET VOLUME: 7.7 FL (ref 6.8–10.2)
MONOCYTE AUTOMATED: 7.8 %
MONOCYTES: 0.7 (ref 0.3–1)
NA (SODIUM, SERUM): 130 MMOL/L (ref 133–144)
NEUTROPHIL ABSOLUTE: 6.6 (ref 1.7–7.7)
NEUTROPHIL AUTOMATED: 78.9 %
NITRITE, URINE AUTOMATED: NEGATIVE
PH, URINE: 8 (ref 5–8)
PLATELET COUNT: 146 K/MM3 (ref 150–450)
PROTEIN TOTAL: 6.1 G/DL (ref 6.4–8.9)
PROTEIN, URINE: 30 MG/DL
PROTHROMBIN TIME (PATIENT): 12.6 SECONDS (ref 10.1–13.1)
PTT: 29 SECONDS (ref 25–36)
RBC: >100 /HPF (ref 0–2)
RED BLOOD CELL COUNT: 4.44 M/MM3 (ref 4.34–5.6)
RED CELL DISTRIBUTIO: 17.2 % (ref 11.9–15.9)
SPECIFIC GRAVITY UA: 1 (ref 1–1.03)
SQUAMOUS EPITHELIAL: ABNORMAL /LPF
TROPONIN I (TROP): < 0.03 NG/ML
URINE, BLOOD, AUTOMATED: ABNORMAL
UROBILINOGEN, URINE, AUTOMATED: ABNORMAL MG/DL
WBC: ABNORMAL /HPF (ref 0–5)
WHITE BLOOD CELL COU: 8.3 K/MM3 (ref 4–11)

## 2017-11-28 ENCOUNTER — CHARTING TRANS (OUTPATIENT)
Dept: OTHER | Age: 67
End: 2017-11-28

## 2017-11-29 LAB — URINE CULTURE: NORMAL

## 2017-12-06 ENCOUNTER — CHARTING TRANS (OUTPATIENT)
Dept: OTHER | Age: 67
End: 2017-12-06

## 2017-12-14 ENCOUNTER — LAB SERVICES (OUTPATIENT)
Dept: OTHER | Age: 67
End: 2017-12-14

## 2017-12-14 ENCOUNTER — CHARTING TRANS (OUTPATIENT)
Dept: OTHER | Age: 67
End: 2017-12-14

## 2017-12-14 LAB
A/G RATIO: 1.54 (ref 1.1–2.4)
ALANINE AMINOTRANSFE: 13 U/L (ref 7–52)
ALBUMIN, SERUM (ALB): 4 G/DL (ref 3.5–5.7)
ALKALINE PHOSPHATASE (ALK): 81 U/L (ref 34–104)
ANION GAP: 7 MEQ/L (ref 8–16)
APPEARANCE: CLEAR
ASPARTATE AMINOTRANS: 14 U/L (ref 13–39)
BACTERIA: ABNORMAL /HPF
BASOPHIL AUTOMATED: 0.8 %
BASOPHILS: 0 (ref 0–0.2)
BILIRUBIN, TOTAL: 0.4 MG/DL (ref 0.2–0.8)
BILIRUBIN: ABNORMAL
BLOOD UREA NITROGEN (BUN): 12 MG/DL (ref 7–25)
BUN/CREATININE RATIO: 11.8 (ref 7.4–23)
CALCIUM, SERUM: 9.1 MG/DL (ref 8.6–10.3)
CARBON DIOXIDE: 26 MMOL/L (ref 21–31)
CHLORIDE, SERUM: 102 MMOL/L (ref 98–107)
COLOR: YELLOW
CREATININE: 1.02 MG/DL (ref 0.7–1.3)
EOSINOPHIL AUTOMATED: 2.5 %
EOSINOPHILS: 0.1 (ref 0–0.5)
EST GLOMERULAR FILTRATION RATE: > 60 1.73M SQ
GLUCOSE P FAST SERPL-MCNC: 159 MG/DL (ref 70–99)
GLUCOSE, URINE, AUTOMATED: ABNORMAL MG/DL
HEMATOCRIT: 39.2 % (ref 38.6–49.2)
HEMOGLOBIN: 12.7 GM/DL (ref 13–17)
K (POTASSIUM, SERUM): 4.2 MMOL/L (ref 3.5–5.1)
KETONES, URINE, AUTOMATED: ABNORMAL MG/DL
LEUKOCYTE, URINE, AUTOMATED: ABNORMAL
LYMPHOCYTE AUTOMATED: 27.4 %
LYMPHOCYTES: 1.3 (ref 0.6–3.4)
MEAN CORPUSCULAR HGB: 29 PG (ref 26–34)
MEAN CORPUSCULAR HGB: 32.6 G/DL (ref 32.5–35.8)
MEAN CORPUSCULAR VOL: 89 FL (ref 80–100)
MEAN PLATELET VOLUME: 7.8 FL (ref 6.8–10.2)
MONOCYTE AUTOMATED: 11.1 %
MONOCYTES: 0.5 (ref 0.3–1)
NA (SODIUM, SERUM): 135 MMOL/L (ref 133–144)
NEUTROPHIL ABSOLUTE: 2.8 (ref 1.7–7.7)
NEUTROPHIL AUTOMATED: 58.2 %
NITRITE, URINE AUTOMATED: NEGATIVE
PH, URINE: 6 (ref 5–8)
PLATELET COUNT: 184 K/MM3 (ref 150–450)
PROTEIN TOTAL: 6.6 G/DL (ref 6.4–8.9)
PROTEIN, URINE: 30 MG/DL
RBC: ABNORMAL /HPF (ref 0–2)
RED BLOOD CELL COUNT: 4.4 M/MM3 (ref 4.34–5.6)
RED CELL DISTRIBUTIO: 17.4 % (ref 11.9–15.9)
SPECIFIC GRAVITY UA: 1.01 (ref 1–1.03)
SQUAMOUS EPITHELIAL: ABNORMAL /LPF
URINE, BLOOD, AUTOMATED: ABNORMAL
UROBILINOGEN, URINE, AUTOMATED: ABNORMAL MG/DL
WBC: ABNORMAL /HPF (ref 0–5)
WHITE BLOOD CELL COU: 4.8 K/MM3 (ref 4–11)

## 2017-12-19 ENCOUNTER — CHARTING TRANS (OUTPATIENT)
Dept: OTHER | Age: 67
End: 2017-12-19

## 2017-12-20 ENCOUNTER — CHARTING TRANS (OUTPATIENT)
Dept: UROLOGY | Age: 67
End: 2017-12-20

## 2017-12-20 ENCOUNTER — BH HISTORICAL (OUTPATIENT)
Dept: OTHER | Age: 67
End: 2017-12-20

## 2017-12-20 ENCOUNTER — CHARTING TRANS (OUTPATIENT)
Dept: OTHER | Age: 67
End: 2017-12-20

## 2017-12-26 ENCOUNTER — CHARTING TRANS (OUTPATIENT)
Dept: OTHER | Age: 67
End: 2017-12-26

## 2017-12-27 ENCOUNTER — CHARTING TRANS (OUTPATIENT)
Dept: OTHER | Age: 67
End: 2017-12-27

## 2018-01-08 ENCOUNTER — CHARTING TRANS (OUTPATIENT)
Dept: OTHER | Age: 68
End: 2018-01-08

## 2018-01-12 ENCOUNTER — CHARTING TRANS (OUTPATIENT)
Dept: OTHER | Age: 68
End: 2018-01-12

## 2018-01-15 ENCOUNTER — CHARTING TRANS (OUTPATIENT)
Dept: OTHER | Age: 68
End: 2018-01-15

## 2018-01-19 ENCOUNTER — CHARTING TRANS (OUTPATIENT)
Dept: OTHER | Age: 68
End: 2018-01-19

## 2018-01-22 ENCOUNTER — CHARTING TRANS (OUTPATIENT)
Dept: OTHER | Age: 68
End: 2018-01-22

## 2018-01-25 ENCOUNTER — LAB SERVICES (OUTPATIENT)
Dept: OTHER | Age: 68
End: 2018-01-25

## 2018-01-25 LAB
BASOPHIL %: 0.4 % (ref 0–1.2)
BASOPHIL ABSOLUTE #: 0 10*3/UL (ref 0–0.1)
DIFFERENTIAL TYPE: ABNORMAL
EOSINOPHIL %: 2.5 % (ref 0–10)
EOSINOPHIL ABSOLUTE #: 0.1 10*3/UL (ref 0–0.5)
EST. AVERAGE GLUCOSE BLD GHB EST-MCNC: 146 MG/DL (ref 0–154)
HBA1C MFR BLD: 6.7 % (ref 4.2–6)
HEMATOCRIT: 38.9 % (ref 40–51)
HEMOGLOBIN: 13.1 G/DL (ref 13.7–17.5)
LYMPH PERCENT: 32.4 % (ref 20.5–51.1)
LYMPHOCYTE ABSOLUTE #: 1.5 10*3/UL (ref 1.2–3.4)
MEAN CORPUSCULAR HGB CONCENTRATION: 33.7 % (ref 32–36)
MEAN CORPUSCULAR HGB: 30.2 PG (ref 27–34)
MEAN CORPUSCULAR VOLUME: 89.6 FL (ref 79–95)
MEAN PLATELET VOLUME: 10 FL (ref 8.6–12.4)
MONOCYTE ABSOLUTE #: 0.5 10*3/UL (ref 0.2–0.9)
MONOCYTE PERCENT: 10.7 % (ref 4.3–12.9)
NEUTROPHIL ABSOLUTE #: 2.6 10*3/UL (ref 1.4–6.5)
NEUTROPHIL PERCENT: 54 % (ref 34–73.5)
PLATELET COUNT: 158 10*3/UL (ref 150–400)
RED BLOOD CELL COUNT: 4.34 10*6/UL (ref 3.9–5.7)
RED CELL DISTRIBUTION WIDTH: 15.8 % (ref 11.3–14.8)
TSH SERPL DL<=0.05 MIU/L-ACNC: 0.88 M[IU]/L (ref 0.3–4.82)
WHITE BLOOD CELL COUNT: 4.8 10*3/UL (ref 4–10)

## 2018-01-29 ENCOUNTER — CHARTING TRANS (OUTPATIENT)
Dept: OTHER | Age: 68
End: 2018-01-29

## 2018-01-29 ENCOUNTER — IMAGING SERVICES (OUTPATIENT)
Dept: OTHER | Age: 68
End: 2018-01-29

## 2018-01-30 ENCOUNTER — CHARTING TRANS (OUTPATIENT)
Dept: OTHER | Age: 68
End: 2018-01-30

## 2018-01-31 ENCOUNTER — CHARTING TRANS (OUTPATIENT)
Dept: OTHER | Age: 68
End: 2018-01-31

## 2018-02-01 ENCOUNTER — CHARTING TRANS (OUTPATIENT)
Dept: OTHER | Age: 68
End: 2018-02-01

## 2018-02-01 ENCOUNTER — LAB SERVICES (OUTPATIENT)
Dept: OTHER | Age: 68
End: 2018-02-01

## 2018-02-01 LAB
GLUCOSE BLD MANUAL STRIP-MCNC: 123 MG/DL (ref 66–97)
GLUCOSE BLD MANUAL STRIP-MCNC: 136 MG/DL (ref 66–97)

## 2018-02-02 ENCOUNTER — CHARTING TRANS (OUTPATIENT)
Dept: OTHER | Age: 68
End: 2018-02-02

## 2018-02-05 ENCOUNTER — CHARTING TRANS (OUTPATIENT)
Dept: OTHER | Age: 68
End: 2018-02-05

## 2018-02-05 ENCOUNTER — IMAGING SERVICES (OUTPATIENT)
Dept: OTHER | Age: 68
End: 2018-02-05

## 2018-02-06 ENCOUNTER — CHARTING TRANS (OUTPATIENT)
Dept: OTHER | Age: 68
End: 2018-02-06

## 2018-02-12 ENCOUNTER — CHARTING TRANS (OUTPATIENT)
Dept: OTHER | Age: 68
End: 2018-02-12

## 2018-02-16 ENCOUNTER — CHARTING TRANS (OUTPATIENT)
Dept: OTHER | Age: 68
End: 2018-02-16

## 2018-02-21 ENCOUNTER — IMAGING SERVICES (OUTPATIENT)
Dept: OTHER | Age: 68
End: 2018-02-21

## 2018-02-21 ENCOUNTER — CHARTING TRANS (OUTPATIENT)
Dept: OTHER | Age: 68
End: 2018-02-21

## 2018-02-21 ENCOUNTER — MYAURORA ACCOUNT LINK (OUTPATIENT)
Dept: OTHER | Age: 68
End: 2018-02-21

## 2018-03-01 ENCOUNTER — CHARTING TRANS (OUTPATIENT)
Dept: OTHER | Age: 68
End: 2018-03-01

## 2018-03-05 ENCOUNTER — LAB SERVICES (OUTPATIENT)
Dept: OTHER | Age: 68
End: 2018-03-05

## 2018-03-05 LAB
DIFFERENTIAL TYPE: ABNORMAL
HEMATOCRIT: 39.9 % (ref 40–51)
HEMOGLOBIN: 13.5 G/DL (ref 13.7–17.5)
MEAN CORPUSCULAR HGB CONCENTRATION: 33.8 % (ref 32–36)
MEAN CORPUSCULAR HGB: 29.9 PG (ref 27–34)
MEAN CORPUSCULAR VOLUME: 88.3 FL (ref 79–95)
MEAN PLATELET VOLUME: 10.1 FL (ref 8.6–12.4)
PLATELET COUNT: 158 10*3/UL (ref 150–400)
RED BLOOD CELL COUNT: 4.52 10*6/UL (ref 3.9–5.7)
RED CELL DISTRIBUTION WIDTH: 14.6 % (ref 11.3–14.8)
WHITE BLOOD CELL COUNT: 6.4 10*3/UL (ref 4–10)

## 2018-03-08 ENCOUNTER — MYAURORA ACCOUNT LINK (OUTPATIENT)
Dept: OTHER | Age: 68
End: 2018-03-08

## 2018-03-08 ENCOUNTER — CHARTING TRANS (OUTPATIENT)
Dept: OTHER | Age: 68
End: 2018-03-08

## 2018-03-08 ENCOUNTER — IMAGING SERVICES (OUTPATIENT)
Dept: OTHER | Age: 68
End: 2018-03-08

## 2018-03-27 ENCOUNTER — LAB SERVICES (OUTPATIENT)
Dept: OTHER | Age: 68
End: 2018-03-27

## 2018-03-27 ENCOUNTER — CHARTING TRANS (OUTPATIENT)
Dept: OTHER | Age: 68
End: 2018-03-27

## 2018-03-27 LAB
BUN SERPL-MCNC: 10 MG/DL (ref 6–27)
CALCIUM SERPL-MCNC: 9.2 MG/DL (ref 8.6–10.6)
CHLORIDE SERPL-SCNC: 99 MMOL/L (ref 96–107)
CREATININE, SERUM: 0.9 MG/DL (ref 0.6–1.6)
GFR SERPL CREATININE-BSD FRML MDRD: >60 ML/MIN/{1.73M2}
GFR SERPL CREATININE-BSD FRML MDRD: >60 ML/MIN/{1.73M2}
GLUCOSE SERPL-MCNC: 124 MG/DL (ref 70–200)
HCO3 SERPL-SCNC: 26 MMOL/L (ref 22–32)
INTERNATIONAL NORMALIZED RATIO: 1
POTASSIUM SERPL-SCNC: 4.3 MMOL/L (ref 3.5–5.3)
PROTHROMBIN TIME: 10 S (ref 9.5–11.5)
SODIUM SERPL-SCNC: 135 MMOL/L (ref 136–146)

## 2018-03-30 ENCOUNTER — CHARTING TRANS (OUTPATIENT)
Dept: OTHER | Age: 68
End: 2018-03-30

## 2018-04-03 ENCOUNTER — CHARTING TRANS (OUTPATIENT)
Dept: OTHER | Age: 68
End: 2018-04-03

## 2018-04-12 ENCOUNTER — CHARTING TRANS (OUTPATIENT)
Dept: OTHER | Age: 68
End: 2018-04-12

## 2018-04-16 ENCOUNTER — LAB SERVICES (OUTPATIENT)
Dept: OTHER | Age: 68
End: 2018-04-16

## 2018-04-16 LAB
BASOPHIL %: 0.3 % (ref 0–1.2)
BASOPHIL ABSOLUTE #: 0 10*3/UL (ref 0–0.1)
DIFFERENTIAL TYPE: NORMAL
EOSINOPHIL %: 1.2 % (ref 0–10)
EOSINOPHIL ABSOLUTE #: 0.1 10*3/UL (ref 0–0.5)
GLUCOSE BLD MANUAL STRIP-MCNC: 151 MG/DL (ref 66–97)
HEMATOCRIT: 41.7 % (ref 40–51)
HEMOGLOBIN: 14.5 G/DL (ref 13.7–17.5)
LYMPH PERCENT: 22.3 % (ref 20.5–51.1)
LYMPHOCYTE ABSOLUTE #: 1.6 10*3/UL (ref 1.2–3.4)
MEAN CORPUSCULAR HGB CONCENTRATION: 34.8 % (ref 32–36)
MEAN CORPUSCULAR HGB: 30.7 PG (ref 27–34)
MEAN CORPUSCULAR VOLUME: 88.2 FL (ref 79–95)
MEAN PLATELET VOLUME: 10.4 FL (ref 8.6–12.4)
MONOCYTE ABSOLUTE #: 0.7 10*3/UL (ref 0.2–0.9)
MONOCYTE PERCENT: 10.5 % (ref 4.3–12.9)
NEUTROPHIL ABSOLUTE #: 4.6 10*3/UL (ref 1.4–6.5)
NEUTROPHIL PERCENT: 65.7 % (ref 34–73.5)
PLATELET COUNT: 174 10*3/UL (ref 150–400)
RED BLOOD CELL COUNT: 4.73 10*6/UL (ref 3.9–5.7)
RED CELL DISTRIBUTION WIDTH: 14.6 % (ref 11.3–14.8)
WHITE BLOOD CELL COUNT: 7 10*3/UL (ref 4–10)

## 2018-04-17 ENCOUNTER — CHARTING TRANS (OUTPATIENT)
Dept: OTHER | Age: 68
End: 2018-04-17

## 2018-04-19 ENCOUNTER — CHARTING TRANS (OUTPATIENT)
Dept: OTHER | Age: 68
End: 2018-04-19

## 2018-04-20 ENCOUNTER — CHARTING TRANS (OUTPATIENT)
Dept: OTHER | Age: 68
End: 2018-04-20

## 2018-04-20 ENCOUNTER — LAB SERVICES (OUTPATIENT)
Dept: OTHER | Age: 68
End: 2018-04-20

## 2018-04-20 ENCOUNTER — IMAGING SERVICES (OUTPATIENT)
Dept: OTHER | Age: 68
End: 2018-04-20

## 2018-04-20 LAB — GLUCOSE BLD MANUAL STRIP-MCNC: 225 MG/DL (ref 66–97)

## 2018-04-24 ENCOUNTER — MYAURORA ACCOUNT LINK (OUTPATIENT)
Dept: OTHER | Age: 68
End: 2018-04-24

## 2018-04-24 ENCOUNTER — CHARTING TRANS (OUTPATIENT)
Dept: OTHER | Age: 68
End: 2018-04-24

## 2018-04-27 ENCOUNTER — BH HISTORICAL (OUTPATIENT)
Dept: OTHER | Age: 68
End: 2018-04-27

## 2018-04-30 ENCOUNTER — LAB SERVICES (OUTPATIENT)
Dept: OTHER | Age: 68
End: 2018-04-30

## 2018-04-30 LAB
BUN SERPL-MCNC: 9 MG/DL (ref 6–27)
CALCIUM SERPL-MCNC: 9.1 MG/DL (ref 8.6–10.6)
CHLORIDE SERPL-SCNC: 101 MMOL/L (ref 96–107)
CREAT SERPL-MCNC: 0.9 MG/DL (ref 0.6–1.6)
GFR SERPL CREATININE-BSD FRML MDRD: >60 ML/MIN/{1.73M2}
GFR SERPL CREATININE-BSD FRML MDRD: >60 ML/MIN/{1.73M2}
GLUCOSE SERPL-MCNC: 146 MG/DL (ref 70–200)
HCO3 SERPL-SCNC: 28 MMOL/L (ref 22–32)
INTERNATIONAL NORMALIZED RATIO: 1
MAGNESIUM SERPL-MCNC: 2 MG/DL (ref 1.7–2.6)
POTASSIUM SERPL-SCNC: 3.9 MMOL/L (ref 3.5–5.3)
PROTHROMBIN TIME: 10.1 S (ref 9.5–11.5)
SODIUM SERPL-SCNC: 137 MMOL/L (ref 136–146)

## 2018-05-01 ENCOUNTER — CHARTING TRANS (OUTPATIENT)
Dept: OTHER | Age: 68
End: 2018-05-01

## 2018-05-01 LAB
BASOPHIL %: 0.2 % (ref 0–1.2)
BASOPHIL ABSOLUTE #: 0 10*3/UL (ref 0–0.1)
DIFFERENTIAL TYPE: ABNORMAL
EOSINOPHIL %: 1.6 % (ref 0–10)
EOSINOPHIL ABSOLUTE #: 0.1 10*3/UL (ref 0–0.5)
HEMATOCRIT: 40.5 % (ref 40–51)
HEMOGLOBIN: 13.5 G/DL (ref 13.7–17.5)
LYMPH PERCENT: 26.6 % (ref 20.5–51.1)
LYMPHOCYTE ABSOLUTE #: 1.5 10*3/UL (ref 1.2–3.4)
MEAN CORPUSCULAR HGB CONCENTRATION: 33.3 % (ref 32–36)
MEAN CORPUSCULAR HGB: 29.6 PG (ref 27–34)
MEAN CORPUSCULAR VOLUME: 88.8 FL (ref 79–95)
MEAN PLATELET VOLUME: 10.8 FL (ref 8.6–12.4)
MONOCYTE ABSOLUTE #: 0.8 10*3/UL (ref 0.2–0.9)
MONOCYTE PERCENT: 13.2 % (ref 4.3–12.9)
NEUTROPHIL ABSOLUTE #: 3.3 10*3/UL (ref 1.4–6.5)
NEUTROPHIL PERCENT: 58.4 % (ref 34–73.5)
PLATELET COUNT: 169 10*3/UL (ref 150–400)
RED BLOOD CELL COUNT: 4.56 10*6/UL (ref 3.9–5.7)
RED CELL DISTRIBUTION WIDTH: 14.9 % (ref 11.3–14.8)
WHITE BLOOD CELL COUNT: 5.7 10*3/UL (ref 4–10)

## 2018-05-02 ENCOUNTER — CHARTING TRANS (OUTPATIENT)
Dept: OTHER | Age: 68
End: 2018-05-02

## 2018-05-02 ENCOUNTER — LAB SERVICES (OUTPATIENT)
Dept: OTHER | Age: 68
End: 2018-05-02

## 2018-05-02 LAB — BEDSIDE GLUCOSE: 269 MG/DL (ref 70–110)

## 2018-05-04 ENCOUNTER — CHARTING TRANS (OUTPATIENT)
Dept: OTHER | Age: 68
End: 2018-05-04

## 2018-05-04 ENCOUNTER — IMAGING SERVICES (OUTPATIENT)
Dept: OTHER | Age: 68
End: 2018-05-04

## 2018-05-17 ENCOUNTER — MYAURORA ACCOUNT LINK (OUTPATIENT)
Dept: OTHER | Age: 68
End: 2018-05-17

## 2018-05-17 ENCOUNTER — CHARTING TRANS (OUTPATIENT)
Dept: OTHER | Age: 68
End: 2018-05-17

## 2018-05-21 ENCOUNTER — CHARTING TRANS (OUTPATIENT)
Dept: OTHER | Age: 68
End: 2018-05-21

## 2018-05-23 ENCOUNTER — CHARTING TRANS (OUTPATIENT)
Dept: OTHER | Age: 68
End: 2018-05-23

## 2018-05-23 ENCOUNTER — MYAURORA ACCOUNT LINK (OUTPATIENT)
Dept: OTHER | Age: 68
End: 2018-05-23

## 2018-05-30 ENCOUNTER — CHARTING TRANS (OUTPATIENT)
Dept: OTHER | Age: 68
End: 2018-05-30

## 2018-06-04 ENCOUNTER — BH HISTORICAL (OUTPATIENT)
Dept: OTHER | Age: 68
End: 2018-06-04

## 2018-06-07 ENCOUNTER — CHARTING TRANS (OUTPATIENT)
Dept: OTHER | Age: 68
End: 2018-06-07

## 2018-06-22 ENCOUNTER — CHARTING TRANS (OUTPATIENT)
Dept: OTHER | Age: 68
End: 2018-06-22

## 2018-06-27 ENCOUNTER — CHARTING TRANS (OUTPATIENT)
Dept: OTHER | Age: 68
End: 2018-06-27

## 2018-07-05 ENCOUNTER — MYAURORA ACCOUNT LINK (OUTPATIENT)
Dept: OTHER | Age: 68
End: 2018-07-05

## 2018-07-05 ENCOUNTER — CHARTING TRANS (OUTPATIENT)
Dept: OTHER | Age: 68
End: 2018-07-05

## 2018-07-16 ENCOUNTER — BH HISTORICAL (OUTPATIENT)
Dept: OTHER | Age: 68
End: 2018-07-16

## 2018-07-17 ENCOUNTER — CHARTING TRANS (OUTPATIENT)
Dept: OTHER | Age: 68
End: 2018-07-17

## 2018-07-18 ENCOUNTER — MYAURORA ACCOUNT LINK (OUTPATIENT)
Dept: OTHER | Age: 68
End: 2018-07-18

## 2018-08-07 ENCOUNTER — CHARTING TRANS (OUTPATIENT)
Dept: OTHER | Age: 68
End: 2018-08-07

## 2018-08-21 ENCOUNTER — BH HISTORICAL (OUTPATIENT)
Dept: OTHER | Age: 68
End: 2018-08-21

## 2018-08-29 ENCOUNTER — CHARTING TRANS (OUTPATIENT)
Dept: OTHER | Age: 68
End: 2018-08-29

## 2018-09-18 ENCOUNTER — TELEPHONE (OUTPATIENT)
Dept: FAMILY MEDICINE CLINIC | Facility: CLINIC | Age: 68
End: 2018-09-18

## 2018-09-18 ENCOUNTER — OFFICE VISIT (OUTPATIENT)
Dept: FAMILY MEDICINE CLINIC | Facility: CLINIC | Age: 68
End: 2018-09-18

## 2018-09-18 ENCOUNTER — HOSPITAL ENCOUNTER (OUTPATIENT)
Dept: GENERAL RADIOLOGY | Facility: HOSPITAL | Age: 68
Discharge: HOME | End: 2018-09-18
Attending: FAMILY MEDICINE
Payer: MEDICARE

## 2018-09-18 VITALS
BODY MASS INDEX: 32.9 KG/M2 | OXYGEN SATURATION: 98 % | HEIGHT: 66 IN | WEIGHT: 204.69 LBS | DIASTOLIC BLOOD PRESSURE: 64 MMHG | RESPIRATION RATE: 18 BRPM | HEART RATE: 73 BPM | TEMPERATURE: 98 F | SYSTOLIC BLOOD PRESSURE: 116 MMHG

## 2018-09-18 DIAGNOSIS — Z79.4 TYPE 2 DIABETES MELLITUS WITHOUT COMPLICATION, WITH LONG-TERM CURRENT USE OF INSULIN (HCC): ICD-10-CM

## 2018-09-18 DIAGNOSIS — Z23 NEED FOR VACCINATION: ICD-10-CM

## 2018-09-18 DIAGNOSIS — S93.401A MODERATE RIGHT ANKLE SPRAIN, INITIAL ENCOUNTER: Primary | ICD-10-CM

## 2018-09-18 DIAGNOSIS — E11.9 TYPE 2 DIABETES MELLITUS WITHOUT COMPLICATION, WITH LONG-TERM CURRENT USE OF INSULIN (HCC): ICD-10-CM

## 2018-09-18 DIAGNOSIS — S93.401A MODERATE RIGHT ANKLE SPRAIN, INITIAL ENCOUNTER: ICD-10-CM

## 2018-09-18 DIAGNOSIS — F33.1 MODERATE RECURRENT MAJOR DEPRESSION (HCC): ICD-10-CM

## 2018-09-18 PROCEDURE — 73610 X-RAY EXAM OF ANKLE: CPT | Performed by: FAMILY MEDICINE

## 2018-09-18 PROCEDURE — 99203 OFFICE O/P NEW LOW 30 MIN: CPT | Performed by: FAMILY MEDICINE

## 2018-09-18 RX ORDER — ISOSORBIDE MONONITRATE 30 MG/1
1 TABLET, EXTENDED RELEASE ORAL DAILY
Refills: 3 | COMMUNITY
Start: 2018-07-10 | End: 2018-11-02

## 2018-09-18 RX ORDER — BLOOD-GLUCOSE METER
1 EACH MISCELLANEOUS 3 TIMES DAILY
COMMUNITY
Start: 2017-04-11 | End: 2018-11-08

## 2018-09-18 RX ORDER — TAMSULOSIN HYDROCHLORIDE 0.4 MG/1
1 CAPSULE ORAL DAILY
Refills: 3 | COMMUNITY
Start: 2018-08-21 | End: 2019-01-07

## 2018-09-18 RX ORDER — KETOCONAZOLE 2 G/100G
1 AEROSOL, FOAM TOPICAL 2 TIMES DAILY
COMMUNITY
End: 2019-01-14

## 2018-09-18 RX ORDER — EPINEPHRINE 0.3 MG/.3ML
1 INJECTION SUBCUTANEOUS AS NEEDED
COMMUNITY
Start: 2015-06-20 | End: 2020-09-20

## 2018-09-18 RX ORDER — LANCETS 30 GAUGE
1 EACH MISCELLANEOUS 3 TIMES DAILY
COMMUNITY
Start: 2015-07-30

## 2018-09-18 RX ORDER — HYDROCODONE BITARTRATE AND ACETAMINOPHEN 7.5; 325 MG/1; MG/1
1 TABLET ORAL DAILY
Refills: 0 | COMMUNITY
Start: 2018-06-23 | End: 2018-11-08

## 2018-09-18 RX ORDER — FERROUS SULFATE 325(65) MG
1 TABLET ORAL 2 TIMES DAILY
COMMUNITY
Start: 2016-11-23 | End: 2019-02-04 | Stop reason: ALTCHOICE

## 2018-09-18 RX ORDER — UREA 40 %
1 CREAM (GRAM) TOPICAL 2 TIMES DAILY
COMMUNITY
Start: 2018-04-11 | End: 2019-08-15

## 2018-09-18 RX ORDER — SERTRALINE HYDROCHLORIDE 100 MG/1
100 TABLET, FILM COATED ORAL DAILY
Qty: 90 TABLET | Refills: 0 | Status: SHIPPED | OUTPATIENT
Start: 2018-09-18 | End: 2018-11-02

## 2018-09-18 RX ORDER — ZOLPIDEM TARTRATE 10 MG/1
10 TABLET ORAL NIGHTLY PRN
Refills: 0 | Status: CANCELLED | OUTPATIENT
Start: 2018-09-18

## 2018-09-18 NOTE — PATIENT INSTRUCTIONS
· Taking antidepressants can put you at risk of increased suicidal thoughts or depression  · If use feel suicidal or homicidal, call 911 or go to the nearest ER or call our office.   Understanding Ankle Sprain    The ankle is the joint where the leg and aaliyah · Wrapping the ankle with an elastic bandage or ankle brace. This helps reduce swelling and gives some support to the ankle. In rare cases, you may need a cast or boot. · Stretching and other exercises. These improve flexibility and strength.   · Heat pack Mohit Thrasher compresión: Para rebajar la hinchazón y mantener la estabilidad de la articulación, es posible que tenga que enrollarse mandy venda elástica alrededor del tobillo. En el aamir de esguinces más graves, podría necesitar mandy tobillera o hasta un yeso.   Mohit Thrasher La depresión es hoy rosetta de los problemas mentales más comunes. No se trata sólo de sentirse hortencia o desdichado. Es mandy verdadera enfermedad.  La causa parece estar relacionada con mandy disminución de las sustancias químicas que transmiten las señales en el 2) Ocúpese de cuidar noel cuerpo físico. Coma mandy dieta equilibrada (con bajo contenido de grasas saturadas y alto contenido de frutas, verduras y hortalizas). Establezca un plan de ejercicios de, al menos, 3 veces a la semana trey 30 minutos.  Incluso un · ¿He perdido el interés en mi trabajo o en las personas con Maryfrances Every me gustaba pasar el tiempo? · ¿Me asha conciliar el sueño, o estoy durmiendo demasiado? · ¿Estoy comiendo más o menos que lo habitual?  · ¿Me siento cansado, débil y con poca energía?

## 2018-09-18 NOTE — PROGRESS NOTES
CHIEF COMPLAINT: Patient presents with:  Establish Care: bilateral leg pain, tingling,     HPI:     Marilinhanane Xie is a 77year old male with h/o CAD, AAA,cardiomyopathy ,PVD, DM2 with diabetic neuropathy s/p presents for moderate right ankle pain x 1 week bilateral left leg is worse than right. Taking Zestril. Had eye exam 7/5/2018.     HISTORY:  Past Medical History:   Diagnosis Date   • Type II or unspecified type diabetes mellitus without mention of complication, not stated as uncontrolled    • Unspecif [facility] Dr. Laurent Bryan   • CORONARY ANGIOPLASTY WITH STENT PLACEMENT Jun 7, 2015   LM ISR Xience Alpine 4x12 mm KATIA, POBA distal LCX 2.75x20 mm, prox RCA 3.5x15 mm KATIA at [facility] with Dr. Xuan Brock for CP   • CORONARY ANGIOPLASTY WITH STENT PLACEMENT 03/29/2017   mi (100 mg total) by mouth daily. Disp: 90 tablet Rfl: 0   Ranolazine ER (RANEXA) 1000 MG Oral Tablet 12 Hr Take 1,000 mg by mouth 2 (two) times daily. Disp:  Rfl:    atorvastatin 20 MG Oral Tab Take 10 mg by mouth nightly.  Disp:  Rfl:    cetirizine 10 MG Ora Application topically 2 (two) times daily. Disp:  Rfl:    lisinopril (PRINIVIL,ZESTRIL) 10 MG Oral Tab Take 10 mg by mouth daily.  Disp:  Rfl:        Allergies:    Lisinopril              SWELLING, ANGIOEDEMA    Comment:Severe reaction/hospitalized  Radiolo with results within 2 Month(s) from this visit.    Latest known visit with results is:   Admission on 09/07/2017, Discharged on 09/07/2017   Component Date Value   • Ventricular rate 09/07/2017 70    • Atrial rate 09/07/2017 70    • P-R Interval 09/07/2017 RBC 09/07/2017 4.93    • HGB 09/07/2017 13.1    • HCT 09/07/2017 39.2    • PLT 09/07/2017 165.0    • MCV 09/07/2017 79.5*   • MCH 09/07/2017 26.6*   • MCHC 09/07/2017 33.4    • RDW 09/07/2017 21.6*   • RDW-SD 09/07/2017 60.6*   • Neutrophil Absolute Prel* Daughter does not want to change specialists -and likes location of [place], patient will change back to her old PCP. Patient's daughter understands to call insurance and change PCP provider then notify our office.       Meds This Visit:  Requested Prescript

## 2018-09-18 NOTE — TELEPHONE ENCOUNTER
Pts daughter called stating that she would like the air cast that dr Ilene Macias gave her an order for sent to another facility

## 2018-09-18 NOTE — TELEPHONE ENCOUNTER
LMOM to return call to the office. Provided pt office phone [phone] along with office hours given. Lm to inform pt that [facility] [employer] [facility]

## 2018-09-19 PROBLEM — S93.401A MODERATE RIGHT ANKLE SPRAIN: Status: ACTIVE | Noted: 2018-09-19

## 2018-09-19 PROBLEM — F33.1 MODERATE RECURRENT MAJOR DEPRESSION (HCC): Status: ACTIVE | Noted: 2018-09-19

## 2018-09-20 NOTE — TELEPHONE ENCOUNTER
Left message with Xray result. Office # and hours provided for questions    Notes recorded by Bernardo Parikh DO on 9/19/2018 at 10:14 AM CDT  No acute fracture. Symptoms consistent with ankle sprain. Please call pt. Thank you.

## 2018-09-21 ENCOUNTER — TELEPHONE (OUTPATIENT)
Dept: FAMILY MEDICINE CLINIC | Facility: CLINIC | Age: 68
End: 2018-09-21

## 2018-09-21 DIAGNOSIS — S93.401A MODERATE RIGHT ANKLE SPRAIN, INITIAL ENCOUNTER: Primary | ICD-10-CM

## 2018-09-21 NOTE — TELEPHONE ENCOUNTER
Spoke with Hernesto Keyes at [facility], informed her that referral is in process.     Hernesto Keyes request referral faxed to [facility] upon approval at [phone]

## 2018-09-27 ENCOUNTER — CHARTING TRANS (OUTPATIENT)
Dept: OTHER | Age: 68
End: 2018-09-27

## 2018-09-28 ENCOUNTER — CHARTING TRANS (OUTPATIENT)
Dept: OTHER | Age: 68
End: 2018-09-28

## 2018-10-01 ENCOUNTER — CHARTING TRANS (OUTPATIENT)
Dept: OTHER | Age: 68
End: 2018-10-01

## 2018-10-02 ENCOUNTER — CHARTING TRANS (OUTPATIENT)
Dept: OTHER | Age: 68
End: 2018-10-02

## 2018-10-09 ENCOUNTER — PATIENT OUTREACH (OUTPATIENT)
Dept: FAMILY MEDICINE CLINIC | Facility: CLINIC | Age: 68
End: 2018-10-09

## 2018-11-02 ENCOUNTER — OFFICE VISIT (OUTPATIENT)
Dept: FAMILY MEDICINE CLINIC | Facility: CLINIC | Age: 68
End: 2018-11-02
Payer: MEDICARE

## 2018-11-02 VITALS
WEIGHT: 204 LBS | HEART RATE: 68 BPM | SYSTOLIC BLOOD PRESSURE: 110 MMHG | DIASTOLIC BLOOD PRESSURE: 68 MMHG | TEMPERATURE: 98 F | RESPIRATION RATE: 20 BRPM | HEIGHT: 64.5 IN | BODY MASS INDEX: 34.4 KG/M2 | OXYGEN SATURATION: 96 %

## 2018-11-02 DIAGNOSIS — E11.65 DM TYPE 2, UNCONTROLLED, WITH NEUROPATHY (HCC): ICD-10-CM

## 2018-11-02 DIAGNOSIS — N42.31 HIGH GRADE PROSTATIC INTRAEPITHELIAL NEOPLASIA: ICD-10-CM

## 2018-11-02 DIAGNOSIS — I73.9 PERIPHERAL VASCULAR DISEASE (HCC): ICD-10-CM

## 2018-11-02 DIAGNOSIS — F41.1 GENERALIZED ANXIETY DISORDER: ICD-10-CM

## 2018-11-02 DIAGNOSIS — I77.9 PERIPHERAL ARTERIAL OCCLUSIVE DISEASE (HCC): ICD-10-CM

## 2018-11-02 DIAGNOSIS — D50.0 IRON DEFICIENCY ANEMIA DUE TO CHRONIC BLOOD LOSS: ICD-10-CM

## 2018-11-02 DIAGNOSIS — Z23 NEED FOR VACCINATION: ICD-10-CM

## 2018-11-02 DIAGNOSIS — Z12.5 SCREENING FOR MALIGNANT NEOPLASM OF PROSTATE: ICD-10-CM

## 2018-11-02 DIAGNOSIS — F51.04 PSYCHOPHYSIOLOGICAL INSOMNIA: ICD-10-CM

## 2018-11-02 DIAGNOSIS — Z11.59 NEED FOR HEPATITIS C SCREENING TEST: ICD-10-CM

## 2018-11-02 DIAGNOSIS — G89.29 CHRONIC PAIN OF LOWER EXTREMITY, BILATERAL: ICD-10-CM

## 2018-11-02 DIAGNOSIS — E78.5 HYPERLIPIDEMIA, UNSPECIFIED HYPERLIPIDEMIA TYPE: ICD-10-CM

## 2018-11-02 DIAGNOSIS — I25.810 CORONARY ARTERY DISEASE INVOLVING CORONARY BYPASS GRAFT OF NATIVE HEART WITHOUT ANGINA PECTORIS: ICD-10-CM

## 2018-11-02 DIAGNOSIS — Z95.5 S/P CORONARY ARTERY STENT PLACEMENT: ICD-10-CM

## 2018-11-02 DIAGNOSIS — F33.1 MODERATE RECURRENT MAJOR DEPRESSION (HCC): ICD-10-CM

## 2018-11-02 DIAGNOSIS — K21.9 GASTROESOPHAGEAL REFLUX DISEASE, ESOPHAGITIS PRESENCE NOT SPECIFIED: ICD-10-CM

## 2018-11-02 DIAGNOSIS — Z00.00 ENCOUNTER FOR ANNUAL HEALTH EXAMINATION: Primary | ICD-10-CM

## 2018-11-02 DIAGNOSIS — Z79.4 TYPE 2 DIABETES MELLITUS WITH OTHER SPECIFIED COMPLICATION, WITH LONG-TERM CURRENT USE OF INSULIN (HCC): ICD-10-CM

## 2018-11-02 DIAGNOSIS — M79.604 CHRONIC PAIN OF LOWER EXTREMITY, BILATERAL: ICD-10-CM

## 2018-11-02 DIAGNOSIS — N13.8 BPH WITH OBSTRUCTION/LOWER URINARY TRACT SYMPTOMS: ICD-10-CM

## 2018-11-02 DIAGNOSIS — N40.1 BPH WITH OBSTRUCTION/LOWER URINARY TRACT SYMPTOMS: ICD-10-CM

## 2018-11-02 DIAGNOSIS — E11.69 TYPE 2 DIABETES MELLITUS WITH OTHER SPECIFIED COMPLICATION, WITH LONG-TERM CURRENT USE OF INSULIN (HCC): ICD-10-CM

## 2018-11-02 DIAGNOSIS — I71.00 DISSECTION OF AORTA, UNSPECIFIED PORTION OF AORTA (HCC): ICD-10-CM

## 2018-11-02 DIAGNOSIS — K55.20 AVM (ARTERIOVENOUS MALFORMATION) OF COLON: ICD-10-CM

## 2018-11-02 DIAGNOSIS — E11.40 DM TYPE 2, UNCONTROLLED, WITH NEUROPATHY (HCC): ICD-10-CM

## 2018-11-02 DIAGNOSIS — I71.4 ABDOMINAL AORTIC ANEURYSM (AAA) WITHOUT RUPTURE (HCC): ICD-10-CM

## 2018-11-02 DIAGNOSIS — R42 VERTIGO: ICD-10-CM

## 2018-11-02 DIAGNOSIS — I20.0 UNSTABLE ANGINA (HCC): ICD-10-CM

## 2018-11-02 DIAGNOSIS — M79.605 CHRONIC PAIN OF LOWER EXTREMITY, BILATERAL: ICD-10-CM

## 2018-11-02 DIAGNOSIS — R33.9 RETENTION OF URINE: ICD-10-CM

## 2018-11-02 DIAGNOSIS — G45.9 TIA (TRANSIENT ISCHEMIC ATTACK): ICD-10-CM

## 2018-11-02 DIAGNOSIS — I10 ESSENTIAL HYPERTENSION, BENIGN: ICD-10-CM

## 2018-11-02 PROBLEM — S90.212A CONTUSION OF LEFT GREAT TOE WITH DAMAGE TO NAIL: Status: ACTIVE | Noted: 2017-02-07

## 2018-11-02 PROBLEM — M20.42 HAMMER TOES OF BOTH FEET: Status: RESOLVED | Noted: 2018-04-13 | Resolved: 2018-11-02

## 2018-11-02 PROBLEM — T78.3XXA ANGIOEDEMA: Status: ACTIVE | Noted: 2018-11-02

## 2018-11-02 PROBLEM — M79.671 PAIN IN BOTH FEET: Status: ACTIVE | Noted: 2018-11-02

## 2018-11-02 PROBLEM — M79.661 BILATERAL CALF PAIN: Status: ACTIVE | Noted: 2017-02-07

## 2018-11-02 PROBLEM — M20.42 HAMMER TOES OF BOTH FEET: Status: ACTIVE | Noted: 2018-04-13

## 2018-11-02 PROBLEM — T78.3XXA ANGIOEDEMA: Status: RESOLVED | Noted: 2018-11-02 | Resolved: 2018-11-02

## 2018-11-02 PROBLEM — I25.5 CARDIOMYOPATHY, ISCHEMIC: Status: ACTIVE | Noted: 2017-04-18

## 2018-11-02 PROBLEM — M20.41 HAMMER TOES OF BOTH FEET: Status: ACTIVE | Noted: 2018-04-13

## 2018-11-02 PROBLEM — T78.3XXA IDIOPATHIC ANGIOEDEMA: Status: RESOLVED | Noted: 2017-05-05 | Resolved: 2018-11-02

## 2018-11-02 PROBLEM — E87.1 HYPONATREMIA: Status: ACTIVE | Noted: 2017-03-15

## 2018-11-02 PROBLEM — T78.3XXA IDIOPATHIC ANGIOEDEMA: Status: ACTIVE | Noted: 2017-05-05

## 2018-11-02 PROBLEM — F41.9 ANXIETY DISORDER, UNSPECIFIED: Status: ACTIVE | Noted: 2017-03-24

## 2018-11-02 PROBLEM — M79.672 PAIN IN BOTH FEET: Status: ACTIVE | Noted: 2018-11-02

## 2018-11-02 PROBLEM — R51 HEADACHE: Status: ACTIVE | Noted: 2018-11-02

## 2018-11-02 PROBLEM — M20.41 HAMMER TOES OF BOTH FEET: Status: RESOLVED | Noted: 2018-04-13 | Resolved: 2018-11-02

## 2018-11-02 PROBLEM — S93.401A MODERATE RIGHT ANKLE SPRAIN: Status: RESOLVED | Noted: 2018-09-19 | Resolved: 2018-11-02

## 2018-11-02 PROBLEM — M79.662 BILATERAL CALF PAIN: Status: ACTIVE | Noted: 2017-02-07

## 2018-11-02 PROBLEM — S90.212A CONTUSION OF LEFT GREAT TOE WITH DAMAGE TO NAIL: Status: RESOLVED | Noted: 2017-02-07 | Resolved: 2018-11-02

## 2018-11-02 PROBLEM — E87.1 HYPONATREMIA: Status: RESOLVED | Noted: 2017-03-15 | Resolved: 2018-11-02

## 2018-11-02 PROBLEM — R31.9 HEMATURIA: Status: ACTIVE | Noted: 2018-11-02

## 2018-11-02 PROCEDURE — G0402 INITIAL PREVENTIVE EXAM: HCPCS | Performed by: FAMILY MEDICINE

## 2018-11-02 RX ORDER — ISOSORBIDE MONONITRATE 30 MG/1
30 TABLET, EXTENDED RELEASE ORAL DAILY
Qty: 180 TABLET | Refills: 0 | Status: SHIPPED | OUTPATIENT
Start: 2018-11-02 | End: 2019-01-07

## 2018-11-02 RX ORDER — OXYBUTYNIN CHLORIDE 5 MG/1
5 TABLET ORAL
COMMUNITY
Start: 2018-10-02 | End: 2018-11-02

## 2018-11-02 RX ORDER — SERTRALINE HYDROCHLORIDE 100 MG/1
150 TABLET, FILM COATED ORAL DAILY
Qty: 135 TABLET | Refills: 0 | Status: SHIPPED | OUTPATIENT
Start: 2018-11-02 | End: 2018-12-17

## 2018-11-02 RX ORDER — ZOLPIDEM TARTRATE 10 MG/1
10 TABLET ORAL NIGHTLY PRN
Refills: 0 | Status: CANCELLED | OUTPATIENT
Start: 2018-11-02

## 2018-11-02 RX ORDER — ALPRAZOLAM 0.25 MG/1
TABLET ORAL
Qty: 30 TABLET | Refills: 0 | Status: SHIPPED | OUTPATIENT
Start: 2018-11-02 | End: 2018-11-03

## 2018-11-02 RX ORDER — OXYBUTYNIN CHLORIDE 5 MG/1
5 TABLET ORAL DAILY
Qty: 90 TABLET | Refills: 0 | Status: SHIPPED | OUTPATIENT
Start: 2018-11-02 | End: 2019-01-07

## 2018-11-02 RX ORDER — RANOLAZINE 1000 MG/1
1000 TABLET, EXTENDED RELEASE ORAL 2 TIMES DAILY
Qty: 180 TABLET | Refills: 1 | Status: SHIPPED | OUTPATIENT
Start: 2018-11-02 | End: 2019-01-07

## 2018-11-02 NOTE — PATIENT INSTRUCTIONS
Perform labs fasting 8 hours with water or black coffee or or black tea diet  soda only prior to exam.      Anderson Camarena's SCREENING SCHEDULE   Tests on this list are recommended by your physician but may not be covered, or covered at this frequency, by Colorectal Cancer Screening Covered up to Age 76     Colonoscopy Screen   Covered every 10 years- more often if abnormal Colonoscopy due on 08/30/1950 Update Health Maintenance if applicable    Flex Sigmoidoscopy Screen  Covered every 5 years No results visit. This may be covered with your prescription benefits, but Medicare does not cover unless Medically needed    Zoster (Not covered by Medicare Part B) No orders found for this or any previous visit.  This may be covered with your pharmacy  prescription

## 2018-11-02 NOTE — PROGRESS NOTES
HPI:   Jovanni Brooks is a 77year old male who presents for a MA (Medicare Advantage) [facility] (Once per calendar year). Depression ×10-20 years per daughter. takingf Zoloft 100 mg daily x and patient has been more depressed.   Daughter recently was with occluded OM; LM with 90% disease  - Jan 30, 2015 Left Main Veriflex 3.5x15 mm BMS   - Jun 7, 2015 Left Main Alpine KATIA 4x12 mm In stent restenosis and Proximal RCA 3.5x15 mm KATIA, balloon plasty Distal LCX 2.75x20 mm; Patent Atretic LIMA-LAD, SVG-DIAG, 11/17/2018. Denies urinary incontinence or urinary retention. Since TURP has increased stream and denies hesitancy. Denies hematuria. On anticoagulants. Bladder history and procedures listed below from cardiologist note in care everywhere.  Dr Sandi Moore 2016 had colonoscopy found AVM. Patient is taking iron sulfate regularly. Denying black stool or blood in the stool, dizziness or breathing problems. Denies abdominal pain. Denies weakness or fatigue.     Type 2 diabetes on insulin for several years.  D status. Shop for groceries: Need some help   He has difficulties Taking Meds as Rx'd based on screening of functional status. Taking medications as prescribed: Need some help   He has difficulties Affording Meds based on screening of functional status. (abdominal aortic aneurysm) (HCC)     Generalized anxiety disorder     BPH with obstruction/lower urinary tract symptoms     Cardiomyopathy, ischemic     Coronary artery disease involving coronary bypass graft of native heart without angina pectoris     Mo Tablet 12 Hr Take 1 tablet (1,000 mg total) by mouth 2 (two) times daily. Sertraline HCl 100 MG Oral Tab Take 1.5 tablets (150 mg total) by mouth daily. Ferrous Sulfate 325 (65 Fe) MG Oral Tab Take 1 tablet by mouth 2 (two) times daily.    hydrocodone-a 0.4 MG Sublingual SL Tab Place 0.4 mg under the tongue every 5 (five) minutes as needed for Chest pain. lisinopril (PRINIVIL,ZESTRIL) 10 MG Oral Tab Take 10 mg by mouth daily.    MetFORMIN HCl (GLUCOPHAGE) 500 MG Oral Tab Take 1,000 mg by mouth 2 (two) ti incontinence  MUSCULOSKELETAL: denies back pain  NEURO: denies headaches  PSYCHE: denies depression or anxiety  HEMATOLOGIC: denies hx of anemia  ENDOCRINE: denies thyroid history  ALL/ASTHMA: denies hx of allergy or asthma    EXAM:   /68 (BP Locatio Neurologic:  Psych: Normal sensation intact to touch feet and arms bilateral  Very pleasant. Smiles. Answers questions appropriately. Very sad and depressed about wife-on ventilator and hospital [facility]. Tearful when discusses wife.   Denies SI or HI homicidal, call 911 or go to nearest ER and call office  -increased suicide risk on SSRI or SNRI in past  Exercise 3 x weekly x 30-60min      Psychophysiological insomnia  -     ALPRAZolam 0.25 MG Oral Tab;  Take 1 tablet (0.25 mg total) by mouth nightly as aorta, unspecified portion of aorta (HCC)  Unstable angina (HCC)  S/P coronary artery stent placement  Coronary artery disease involving coronary bypass graft of native heart without angina pectoris  Abdominal aortic aneurysm (AAA) without rupture ([facility] [address]  - exercise. Return in about 1 month (around 12/2/2018) for recheck depression and labs. Trini Bose, , 11/2/2018     General Health     In the past six months, have you lost more than 10 pounds without trying?: 2 - No  Has your appetite been poor? Pneumococcal 23 (Pneumovax)  Covered Once after 65 10/01/2014 Please get once after your 65th birthday    Hepatitis B for Moderate/High Risk No vaccine history found Medium/high risk factors:   End-stage renal disease   Hemophiliacs who received Factor

## 2018-11-03 RX ORDER — ALPRAZOLAM 0.25 MG/1
0.25 TABLET ORAL NIGHTLY PRN
Qty: 30 TABLET | Refills: 0 | Status: SHIPPED | OUTPATIENT
Start: 2018-11-03 | End: 2018-12-06

## 2018-11-04 ENCOUNTER — TELEPHONE (OUTPATIENT)
Dept: FAMILY MEDICINE CLINIC | Facility: CLINIC | Age: 68
End: 2018-11-04

## 2018-11-04 PROBLEM — M79.662 BILATERAL CALF PAIN: Status: RESOLVED | Noted: 2017-02-07 | Resolved: 2018-11-04

## 2018-11-04 PROBLEM — G89.29 CHRONIC PAIN OF LOWER EXTREMITY, BILATERAL: Status: ACTIVE | Noted: 2018-11-04

## 2018-11-04 PROBLEM — R31.9 HEMATURIA: Status: RESOLVED | Noted: 2018-11-02 | Resolved: 2018-11-04

## 2018-11-04 PROBLEM — M79.604 CHRONIC PAIN OF LOWER EXTREMITY, BILATERAL: Status: ACTIVE | Noted: 2018-11-04

## 2018-11-04 PROBLEM — M79.605 CHRONIC PAIN OF LOWER EXTREMITY, BILATERAL: Status: ACTIVE | Noted: 2018-11-04

## 2018-11-04 PROBLEM — F41.9 ANXIETY DISORDER, UNSPECIFIED: Status: RESOLVED | Noted: 2017-03-24 | Resolved: 2018-11-04

## 2018-11-04 PROBLEM — G57.93 NEUROPATHY INVOLVING BOTH LOWER EXTREMITIES: Status: ACTIVE | Noted: 2018-11-02

## 2018-11-04 PROBLEM — M79.661 BILATERAL CALF PAIN: Status: RESOLVED | Noted: 2017-02-07 | Resolved: 2018-11-04

## 2018-11-04 PROBLEM — R51 HEADACHE: Status: RESOLVED | Noted: 2018-11-02 | Resolved: 2018-11-04

## 2018-11-04 PROBLEM — F32.A ANXIETY AND DEPRESSION: Status: ACTIVE | Noted: 2018-09-19

## 2018-11-04 PROBLEM — F41.1 GENERALIZED ANXIETY DISORDER: Status: ACTIVE | Noted: 2017-03-24

## 2018-11-04 PROBLEM — F41.9 ANXIETY AND DEPRESSION: Status: ACTIVE | Noted: 2018-09-19

## 2018-11-05 ENCOUNTER — CHARTING TRANS (OUTPATIENT)
Dept: OTHER | Age: 68
End: 2018-11-05

## 2018-11-05 NOTE — TELEPHONE ENCOUNTER
Spoke to pts daughter Ashley Brown, informed of new referral for cardiology. Provided contact information. Daughter verbalized understanding.

## 2018-11-08 ENCOUNTER — TELEPHONE (OUTPATIENT)
Dept: FAMILY MEDICINE CLINIC | Facility: CLINIC | Age: 68
End: 2018-11-08

## 2018-11-08 ENCOUNTER — NURSE ONLY (OUTPATIENT)
Dept: FAMILY MEDICINE CLINIC | Facility: CLINIC | Age: 68
End: 2018-11-08

## 2018-11-08 ENCOUNTER — TELEPHONE (OUTPATIENT)
Dept: ENDOCRINOLOGY CLINIC | Facility: CLINIC | Age: 68
End: 2018-11-08

## 2018-11-08 ENCOUNTER — OFFICE VISIT (OUTPATIENT)
Dept: ENDOCRINOLOGY CLINIC | Facility: CLINIC | Age: 68
End: 2018-11-08
Payer: MEDICARE

## 2018-11-08 VITALS
HEIGHT: 66 IN | DIASTOLIC BLOOD PRESSURE: 80 MMHG | BODY MASS INDEX: 32.95 KG/M2 | RESPIRATION RATE: 20 BRPM | WEIGHT: 205 LBS | HEART RATE: 78 BPM | TEMPERATURE: 98 F | SYSTOLIC BLOOD PRESSURE: 110 MMHG

## 2018-11-08 DIAGNOSIS — D50.0 IRON DEFICIENCY ANEMIA DUE TO CHRONIC BLOOD LOSS: ICD-10-CM

## 2018-11-08 DIAGNOSIS — I10 ESSENTIAL HYPERTENSION, BENIGN: ICD-10-CM

## 2018-11-08 DIAGNOSIS — E11.69 TYPE 2 DIABETES MELLITUS WITH OTHER SPECIFIED COMPLICATION, WITH LONG-TERM CURRENT USE OF INSULIN (HCC): ICD-10-CM

## 2018-11-08 DIAGNOSIS — I25.810 CORONARY ARTERY DISEASE INVOLVING CORONARY BYPASS GRAFT OF NATIVE HEART WITHOUT ANGINA PECTORIS: ICD-10-CM

## 2018-11-08 DIAGNOSIS — N40.1 BPH WITH OBSTRUCTION/LOWER URINARY TRACT SYMPTOMS: ICD-10-CM

## 2018-11-08 DIAGNOSIS — Z79.4 TYPE 2 DIABETES MELLITUS WITH OTHER SPECIFIED COMPLICATION, WITH LONG-TERM CURRENT USE OF INSULIN (HCC): ICD-10-CM

## 2018-11-08 DIAGNOSIS — Z12.5 SCREENING FOR MALIGNANT NEOPLASM OF PROSTATE: ICD-10-CM

## 2018-11-08 DIAGNOSIS — E11.40 DM TYPE 2, UNCONTROLLED, WITH NEUROPATHY (HCC): Primary | ICD-10-CM

## 2018-11-08 DIAGNOSIS — Z11.59 NEED FOR HEPATITIS C SCREENING TEST: ICD-10-CM

## 2018-11-08 DIAGNOSIS — I77.9 PERIPHERAL ARTERIAL OCCLUSIVE DISEASE (HCC): ICD-10-CM

## 2018-11-08 DIAGNOSIS — K55.20 AVM (ARTERIOVENOUS MALFORMATION) OF COLON: ICD-10-CM

## 2018-11-08 DIAGNOSIS — N13.8 BPH WITH OBSTRUCTION/LOWER URINARY TRACT SYMPTOMS: ICD-10-CM

## 2018-11-08 DIAGNOSIS — E11.65 DM TYPE 2, UNCONTROLLED, WITH NEUROPATHY (HCC): Primary | ICD-10-CM

## 2018-11-08 PROCEDURE — 99214 OFFICE O/P EST MOD 30 MIN: CPT | Performed by: NURSE PRACTITIONER

## 2018-11-08 PROCEDURE — 82570 ASSAY OF URINE CREATININE: CPT | Performed by: FAMILY MEDICINE

## 2018-11-08 PROCEDURE — 82043 UR ALBUMIN QUANTITATIVE: CPT | Performed by: FAMILY MEDICINE

## 2018-11-08 PROCEDURE — 85025 COMPLETE CBC W/AUTO DIFF WBC: CPT | Performed by: FAMILY MEDICINE

## 2018-11-08 PROCEDURE — 83036 HEMOGLOBIN GLYCOSYLATED A1C: CPT | Performed by: FAMILY MEDICINE

## 2018-11-08 PROCEDURE — 80061 LIPID PANEL: CPT | Performed by: FAMILY MEDICINE

## 2018-11-08 PROCEDURE — 86803 HEPATITIS C AB TEST: CPT | Performed by: FAMILY MEDICINE

## 2018-11-08 PROCEDURE — 80053 COMPREHEN METABOLIC PANEL: CPT | Performed by: FAMILY MEDICINE

## 2018-11-08 RX ORDER — BLOOD SUGAR DIAGNOSTIC
STRIP MISCELLANEOUS
Qty: 100 STRIP | Refills: 11 | Status: SHIPPED | OUTPATIENT
Start: 2018-11-08 | End: 2018-11-08

## 2018-11-08 RX ORDER — BLOOD-GLUCOSE METER
1 EACH MISCELLANEOUS ONCE
Qty: 1 KIT | Refills: 0 | Status: SHIPPED | OUTPATIENT
Start: 2018-11-08 | End: 2018-11-08

## 2018-11-08 RX ORDER — LANCETS
EACH MISCELLANEOUS
Qty: 1 BOX | Refills: 11 | Status: SHIPPED | OUTPATIENT
Start: 2018-11-08

## 2018-11-08 RX ORDER — BLOOD SUGAR DIAGNOSTIC
STRIP MISCELLANEOUS
Qty: 100 STRIP | Refills: 11 | Status: SHIPPED | OUTPATIENT
Start: 2018-11-08 | End: 2020-01-22

## 2018-11-08 RX ORDER — LANCETS 33 GAUGE
EACH MISCELLANEOUS
Qty: 1 BOX | Refills: 11 | Status: SHIPPED | OUTPATIENT
Start: 2018-11-08 | End: 2018-11-08

## 2018-11-08 NOTE — PROGRESS NOTES
Patient presents with:  Diabetes: new pt. referred by PCP. HPI:   Colonel Ryan is a 77year old male presenting with type 2  DM medication management. In the past 3m, DM control has been uncontrolled; presents w daughter and caretaker today.  Pt does speak ANGIOEDEMA    Comment:Exacerbation of tongue/lip angioedema following IV             contrast for abdominal CT 4/2017.   Past Medical History:   Diagnosis Date   • Acute pancreatitis 1/26/2015   • Cholecystitis 2/17/2015   • Hammer toes of both feet 4/13/20 100 strip Rfl: 11   ONETOUCH DELICA LANCETS 24H Does not apply Misc Test 3 x daily Disp: 1 Box Rfl: 11   ALPRAZolam 0.25 MG Oral Tab Take 1 tablet (0.25 mg total) by mouth nightly as needed for Sleep.  Disp: 30 tablet Rfl: 0   Oxybutynin Chloride 5 MG Oral Disp: 30 tablet Rfl: 4   gabapentin (NEURONTIN) 300 MG Oral Cap Take 300 mg by mouth 3 (three) times daily. Disp:  Rfl:    lisinopril (PRINIVIL,ZESTRIL) 10 MG Oral Tab Take 10 mg by mouth daily.  Disp:  Rfl:    MetFORMIN HCl (GLUCOPHAGE) 500 MG Oral Tab [facility] 33.09 kg/m²   Body mass index is 33.09 kg/m². Physical Exam   Vitals reviewed. Constitutional: He is oriented to person, place, and time. He appears well-developed and well-nourished. No distress. Eyes: Conjunctivae are normal.   Neck: Neck supple. insulin  CDE visit today to review use of insulin pens, site rotation, s/s and tx hypoglycemia  Also instructed on meter use: verio ; to test 3 x daily for now : pre meals   Avoid lower abdomen due to lipo hypertrophy   F.u 1 week due to poor control

## 2018-11-08 NOTE — TELEPHONE ENCOUNTER
Pt.'s daughter came to visit w/APN today. She has requested a letter stating that due to his wife's health issues , her father requires 8 hrs of care per caretaker as opposed to 4 hrs. Please advise.

## 2018-11-08 NOTE — PROGRESS NOTES
Patient came in for fasting labs. Patient drawn out of right AC x 1 attempt. Gold, green, and lavender tube drawn.

## 2018-11-08 NOTE — PATIENT INSTRUCTIONS
Stop NOVOLIN 70/30 insulin     You will now be on 2 insulin pens for your blood sugars.      Alida Devoid: long acting ONCE daily (basal ) insulin   Humalog: short acting insulin : dosed right before meals     Diabetes regimen    Tresiba: dose at 9pm every day :

## 2018-11-08 NOTE — PROGRESS NOTES
Israel Ford  : 1950 was seen for Injection Instruction:  Pt.  Accompanied by caretaker & daughter to visit:    Date: 2018 Referring MD: Almas Vo  Start time: 2:15pm  End time: 2:45pm    /80   Pulse 78   Temp 97.8 °F (36.6 °C) (Oral) carb  Patient set goals for glucose management. Comments / Follow-up Recommendations: Follow-up in 1 week w/C.  VIVEK Franz on 11/15/18 &  W/KENNEY BREWSTERE on 11/26/18  Pillo Richardson RN, CDE

## 2018-11-08 NOTE — ASSESSMENT & PLAN NOTE
A1C pending at lab     today in office (non fasting)   Will stop NOVOLIN 70/30   Pt/daughter prefer basal - bolus and insulin pens for dosing convenience   Will start Tresiba U100 : 40 units once daily   Advised to take at 9pm DAILY   Humalog U 100

## 2018-11-09 ENCOUNTER — TELEPHONE (OUTPATIENT)
Dept: FAMILY MEDICINE CLINIC | Facility: CLINIC | Age: 68
End: 2018-11-09

## 2018-11-09 NOTE — TELEPHONE ENCOUNTER
Informed pts daughter of results and recommendations. Provided information for referral. Daughter verbalized understanding.  ----- Message from Lupe Melendez DO sent at 11/8/2018  8:22 PM CST -----  CBC-decreasing hemoglobin-history of AVMs.   Please and s

## 2018-11-12 NOTE — TELEPHONE ENCOUNTER
LMOM to return call to the office. Provided pt office phone [phone] along with office hours given.

## 2018-11-14 NOTE — TELEPHONE ENCOUNTER
Patient signed HIPAA medical records authorization form for the Facility identified below to disclose patient health information to Segundo Navarro / Provider Name: Dr. Sera Taylor Address: [address]  [address]

## 2018-11-15 ENCOUNTER — OFFICE VISIT (OUTPATIENT)
Dept: ENDOCRINOLOGY CLINIC | Facility: CLINIC | Age: 68
End: 2018-11-15
Payer: MEDICARE

## 2018-11-15 VITALS
OXYGEN SATURATION: 96 % | SYSTOLIC BLOOD PRESSURE: 100 MMHG | HEIGHT: 66 IN | HEART RATE: 77 BPM | DIASTOLIC BLOOD PRESSURE: 60 MMHG | RESPIRATION RATE: 16 BRPM | TEMPERATURE: 98 F | BODY MASS INDEX: 32.95 KG/M2 | WEIGHT: 205 LBS

## 2018-11-15 DIAGNOSIS — E11.65 DM TYPE 2, UNCONTROLLED, WITH NEUROPATHY (HCC): Primary | ICD-10-CM

## 2018-11-15 DIAGNOSIS — E11.40 DM TYPE 2, UNCONTROLLED, WITH NEUROPATHY (HCC): Primary | ICD-10-CM

## 2018-11-15 PROCEDURE — 99214 OFFICE O/P EST MOD 30 MIN: CPT | Performed by: NURSE PRACTITIONER

## 2018-11-15 PROCEDURE — 95250 CONT GLUC MNTR PHYS/QHP EQP: CPT | Performed by: NURSE PRACTITIONER

## 2018-11-15 PROCEDURE — 95251 CONT GLUC MNTR ANALYSIS I&R: CPT | Performed by: NURSE PRACTITIONER

## 2018-11-15 RX ORDER — INSULIN LISPRO 100 [IU]/ML
100 INJECTION, SOLUTION INTRAVENOUS; SUBCUTANEOUS
Qty: 3 ML | Refills: 0 | Status: CANCELLED | COMMUNITY
Start: 2018-11-15

## 2018-11-15 RX ORDER — ZOLPIDEM TARTRATE 5 MG/1
5 TABLET ORAL NIGHTLY
Qty: 1 TABLET | Refills: 1 | Status: CANCELLED | OUTPATIENT
Start: 2018-11-15

## 2018-11-15 NOTE — TELEPHONE ENCOUNTER
LFV: today  Future Appt: 11/26/18  Last Rx:11/15/18 for 15ml, pt requesting 90 days  Last Labs:11/8/18 a1c uncontrolled 11  Per protocol to provider

## 2018-11-15 NOTE — ASSESSMENT & PLAN NOTE
A1C : 11.0%   Uncontrolled  Reminded again importance of improving DM   Placed Gutierrez CGM on L forearm today to assess 24 hour glucose trends so that we can safely make insulin adjustments at follow up   Continue:   Metformin 500mg; 2 tabs twice daily    Tr

## 2018-11-15 NOTE — PROGRESS NOTES
Patient presents with: Follow - Up: Dibetic, No device with him. Brought humalog pen, states it is not working. HPI:   Real Raphael is a 77year old male presents for type 2  DM medication management.  His BG today post breakfast is 233 mg/dl which is impr mg/dL 108   LDL Cholesterol Calc      <100 mg/dL 39     Component      Latest Ref Rng & Units 11/8/2018   MALB/CRE CALC      <=30.0 ug/mg 10.0     SMBG patterns: Needs meter   testing BG  4 x daily   No Meter or log book today    DM associated symptoms: Never Used      Tobacco comment: quit 1976    Alcohol use: No    Drug use: No    Family History   Problem Relation Age of Onset   • Diabetes Sister    • Heart Disorder Sister    • Diabetes Brother    • Heart Disorder Brother    • No Known Problems Son times daily. Disp:  Rfl:    EPINEPHrine (EPIPEN 2-GIOVANI) 0.3 MG/0.3ML Injection Solution Auto-injector Inject 1 applicator as directed as needed. Disp:  Rfl:    cimetidine 200 MG Oral Tab Take 1 tablet by mouth daily.  Disp:  Rfl:    tamsulosin HCl 0.4 MG Ora Oral Tab Take 6.25 mg by mouth 2 (two) times daily with meals. Disp:  Rfl:      Review of Systems   Constitutional: Positive for fatigue. Negative for unexpected weight change. HENT: Negative for dental problem. Eyes: Positive for visual disturbance. improving DM   Placed Gutierrez CGM on L forearm today to assess 24 hour glucose trends so that we can safely make insulin adjustments at follow up   Continue:   Metformin 500mg; 2 tabs twice daily    Tresiba U100 : 40 units once daily   Reminded to take at 9p

## 2018-11-15 NOTE — PATIENT INSTRUCTIONS
Continue:     Metformin twice daily     INSULIN:      Tresiba 40 units at 9pm       Humalog Qwikpen 3 times daily before meals per scale:     If blood sugar less than 100, No insulin     If blood sugar is 100-150, take 6 units of insulin     If blood sugar azúcar en la arik es 351-400, tome 11 unidades de insulina. Si el azúcar en la arik es> 400, tome 12 unidades de [place].           Se colocó un sensor de glucosa continuo en la parte posterior de noel brazo para medir y almacenar doc niveles de

## 2018-11-23 ENCOUNTER — IMAGING SERVICES (OUTPATIENT)
Dept: OTHER | Age: 68
End: 2018-11-23

## 2018-11-26 ENCOUNTER — MED REC SCAN ONLY (OUTPATIENT)
Dept: ENDOCRINOLOGY CLINIC | Facility: CLINIC | Age: 68
End: 2018-11-26

## 2018-11-26 ENCOUNTER — NURSE ONLY (OUTPATIENT)
Dept: ENDOCRINOLOGY CLINIC | Facility: CLINIC | Age: 68
End: 2018-11-26
Payer: MEDICARE

## 2018-11-26 VITALS
HEART RATE: 72 BPM | DIASTOLIC BLOOD PRESSURE: 71 MMHG | SYSTOLIC BLOOD PRESSURE: 128 MMHG | BODY MASS INDEX: 33 KG/M2 | WEIGHT: 204 LBS

## 2018-11-26 DIAGNOSIS — E11.40 DM TYPE 2, UNCONTROLLED, WITH NEUROPATHY (HCC): Primary | ICD-10-CM

## 2018-11-26 DIAGNOSIS — E11.65 DM TYPE 2, UNCONTROLLED, WITH NEUROPATHY (HCC): Primary | ICD-10-CM

## 2018-11-26 PROCEDURE — G0108 DIAB MANAGE TRN  PER INDIV: HCPCS

## 2018-11-26 RX ORDER — FLASH GLUCOSE SCANNING READER
1 EACH MISCELLANEOUS
Qty: 2 DEVICE | Refills: 3 | Status: SHIPPED | OUTPATIENT
Start: 2018-11-26 | End: 2019-01-14

## 2018-11-26 RX ORDER — FLASH GLUCOSE SENSOR
KIT MISCELLANEOUS
Qty: 1 DEVICE | Refills: 0 | Status: SHIPPED | OUTPATIENT
Start: 2018-11-26 | End: 2019-10-21

## 2018-11-26 NOTE — PATIENT INSTRUCTIONS
1. Aumente la Greg Shingles a 50 unidades a las 9 de la noche    2.  Aumente la Humalog antes de comer javier el nivel de azucar    100-150- 8    151-200- 9    201-250- 10    251-300- 11    301-350- 12    351-400- 13  arriva de 400- 14

## 2018-11-26 NOTE — PROGRESS NOTES
Clementine Ford  [id number] was seen for Continuous Glucose Sensor Follow-up Visit:    Date: 11/26/2018  Start time: 9:30a End time: 10:30am      Sensor Type: Gutierrez Proffesional    Site Assessment: left arm and it is without any signs of redness and nor s

## 2018-11-27 ENCOUNTER — CHARTING TRANS (OUTPATIENT)
Dept: OTHER | Age: 68
End: 2018-11-27

## 2018-11-27 VITALS
DIASTOLIC BLOOD PRESSURE: 64 MMHG | BODY MASS INDEX: 33.59 KG/M2 | RESPIRATION RATE: 18 BRPM | HEIGHT: 66 IN | WEIGHT: 209 LBS | TEMPERATURE: 96.1 F | HEART RATE: 74 BPM | OXYGEN SATURATION: 95 % | SYSTOLIC BLOOD PRESSURE: 102 MMHG

## 2018-11-27 VITALS
WEIGHT: 209 LBS | DIASTOLIC BLOOD PRESSURE: 66 MMHG | HEIGHT: 66 IN | RESPIRATION RATE: 14 BRPM | OXYGEN SATURATION: 98 % | BODY MASS INDEX: 33.59 KG/M2 | HEART RATE: 71 BPM | SYSTOLIC BLOOD PRESSURE: 116 MMHG

## 2018-11-27 VITALS
DIASTOLIC BLOOD PRESSURE: 74 MMHG | RESPIRATION RATE: 16 BRPM | SYSTOLIC BLOOD PRESSURE: 112 MMHG | BODY MASS INDEX: 33.27 KG/M2 | WEIGHT: 207 LBS | HEART RATE: 72 BPM | HEIGHT: 66 IN

## 2018-11-27 VITALS
BODY MASS INDEX: 33.59 KG/M2 | TEMPERATURE: 97 F | SYSTOLIC BLOOD PRESSURE: 126 MMHG | WEIGHT: 209 LBS | HEIGHT: 66 IN | HEART RATE: 84 BPM | DIASTOLIC BLOOD PRESSURE: 80 MMHG

## 2018-11-27 NOTE — PROGRESS NOTES
Called [facility]. They received our e-script which they don't take. She is faxing us their medicare form to [phone]. They also need insurance info and clinical notes.

## 2018-11-28 VITALS
HEART RATE: 84 BPM | TEMPERATURE: 97 F | BODY MASS INDEX: 30.53 KG/M2 | DIASTOLIC BLOOD PRESSURE: 60 MMHG | WEIGHT: 190 LBS | SYSTOLIC BLOOD PRESSURE: 136 MMHG | HEIGHT: 66 IN

## 2018-11-28 VITALS — TEMPERATURE: 98.1 F | BODY MASS INDEX: 32.95 KG/M2 | HEIGHT: 66 IN | WEIGHT: 205 LBS

## 2018-11-28 VITALS
DIASTOLIC BLOOD PRESSURE: 58 MMHG | SYSTOLIC BLOOD PRESSURE: 102 MMHG | BODY MASS INDEX: 32.14 KG/M2 | HEART RATE: 80 BPM | WEIGHT: 200 LBS | TEMPERATURE: 96.8 F | HEIGHT: 66 IN

## 2018-11-28 VITALS
RESPIRATION RATE: 18 BRPM | TEMPERATURE: 96.7 F | HEIGHT: 66 IN | DIASTOLIC BLOOD PRESSURE: 72 MMHG | RESPIRATION RATE: 16 BRPM | HEIGHT: 66 IN | WEIGHT: 198 LBS | HEART RATE: 66 BPM | SYSTOLIC BLOOD PRESSURE: 118 MMHG | HEART RATE: 75 BPM | OXYGEN SATURATION: 98 % | BODY MASS INDEX: 31.82 KG/M2 | SYSTOLIC BLOOD PRESSURE: 116 MMHG | DIASTOLIC BLOOD PRESSURE: 78 MMHG | OXYGEN SATURATION: 98 % | WEIGHT: 198 LBS | BODY MASS INDEX: 31.82 KG/M2

## 2018-11-28 VITALS
HEIGHT: 66 IN | SYSTOLIC BLOOD PRESSURE: 110 MMHG | HEART RATE: 72 BPM | WEIGHT: 202 LBS | DIASTOLIC BLOOD PRESSURE: 74 MMHG | BODY MASS INDEX: 32.47 KG/M2

## 2018-11-28 VITALS — BODY MASS INDEX: 32.47 KG/M2 | TEMPERATURE: 97.9 F | HEIGHT: 66 IN | WEIGHT: 202 LBS

## 2018-11-28 VITALS
HEIGHT: 66 IN | HEART RATE: 80 BPM | WEIGHT: 200 LBS | DIASTOLIC BLOOD PRESSURE: 70 MMHG | SYSTOLIC BLOOD PRESSURE: 110 MMHG | BODY MASS INDEX: 32.14 KG/M2 | TEMPERATURE: 97.8 F

## 2018-11-28 VITALS
RESPIRATION RATE: 16 BRPM | HEART RATE: 66 BPM | WEIGHT: 200 LBS | HEIGHT: 66 IN | BODY MASS INDEX: 32.14 KG/M2 | DIASTOLIC BLOOD PRESSURE: 76 MMHG | SYSTOLIC BLOOD PRESSURE: 114 MMHG

## 2018-11-28 VITALS
RESPIRATION RATE: 16 BRPM | OXYGEN SATURATION: 97 % | HEART RATE: 68 BPM | HEIGHT: 66 IN | DIASTOLIC BLOOD PRESSURE: 70 MMHG | BODY MASS INDEX: 32.95 KG/M2 | SYSTOLIC BLOOD PRESSURE: 118 MMHG | WEIGHT: 205 LBS

## 2018-11-28 VITALS
HEART RATE: 76 BPM | DIASTOLIC BLOOD PRESSURE: 68 MMHG | TEMPERATURE: 97 F | WEIGHT: 210 LBS | SYSTOLIC BLOOD PRESSURE: 132 MMHG

## 2018-11-28 VITALS — BODY MASS INDEX: 32.78 KG/M2 | HEIGHT: 66 IN | WEIGHT: 204 LBS | TEMPERATURE: 96.5 F

## 2018-11-28 VITALS
DIASTOLIC BLOOD PRESSURE: 72 MMHG | HEIGHT: 66 IN | SYSTOLIC BLOOD PRESSURE: 122 MMHG | WEIGHT: 204 LBS | HEART RATE: 88 BPM | OXYGEN SATURATION: 98 % | BODY MASS INDEX: 32.78 KG/M2

## 2018-11-28 VITALS
TEMPERATURE: 97.2 F | WEIGHT: 189 LBS | DIASTOLIC BLOOD PRESSURE: 58 MMHG | BODY MASS INDEX: 30.37 KG/M2 | HEART RATE: 80 BPM | HEIGHT: 66 IN | SYSTOLIC BLOOD PRESSURE: 120 MMHG

## 2018-11-28 VITALS — TEMPERATURE: 97.9 F | WEIGHT: 198 LBS | HEIGHT: 66 IN | BODY MASS INDEX: 31.82 KG/M2

## 2018-11-28 VITALS
RESPIRATION RATE: 18 BRPM | WEIGHT: 200 LBS | DIASTOLIC BLOOD PRESSURE: 70 MMHG | TEMPERATURE: 97.5 F | SYSTOLIC BLOOD PRESSURE: 122 MMHG | OXYGEN SATURATION: 95 % | HEART RATE: 63 BPM | BODY MASS INDEX: 32.14 KG/M2 | HEIGHT: 66 IN

## 2018-11-28 VITALS
WEIGHT: 203 LBS | HEART RATE: 74 BPM | OXYGEN SATURATION: 97 % | BODY MASS INDEX: 32.62 KG/M2 | DIASTOLIC BLOOD PRESSURE: 70 MMHG | HEIGHT: 66 IN | SYSTOLIC BLOOD PRESSURE: 130 MMHG

## 2018-11-28 VITALS
BODY MASS INDEX: 30.86 KG/M2 | HEIGHT: 66 IN | OXYGEN SATURATION: 100 % | WEIGHT: 192 LBS | HEART RATE: 80 BPM | DIASTOLIC BLOOD PRESSURE: 70 MMHG | SYSTOLIC BLOOD PRESSURE: 128 MMHG

## 2018-11-28 VITALS
DIASTOLIC BLOOD PRESSURE: 72 MMHG | BODY MASS INDEX: 33.43 KG/M2 | SYSTOLIC BLOOD PRESSURE: 124 MMHG | WEIGHT: 208 LBS | HEART RATE: 72 BPM | HEIGHT: 66 IN

## 2018-11-28 NOTE — PROGRESS NOTES
[facility]: faxed their order form completed and signed by CAB, clinical notes including testing QID, insurance (Medicare) card and face sheet. Confirmation received. Sent to scan.   Pebbles Wood MS, CARON, CDE, LDN

## 2018-11-29 VITALS
RESPIRATION RATE: 16 BRPM | SYSTOLIC BLOOD PRESSURE: 134 MMHG | DIASTOLIC BLOOD PRESSURE: 48 MMHG | HEART RATE: 64 BPM | OXYGEN SATURATION: 99 % | TEMPERATURE: 98 F

## 2018-11-29 VITALS
WEIGHT: 198 LBS | DIASTOLIC BLOOD PRESSURE: 82 MMHG | HEIGHT: 66 IN | RESPIRATION RATE: 16 BRPM | HEART RATE: 76 BPM | SYSTOLIC BLOOD PRESSURE: 122 MMHG | BODY MASS INDEX: 31.82 KG/M2

## 2018-11-29 VITALS
WEIGHT: 196 LBS | SYSTOLIC BLOOD PRESSURE: 130 MMHG | HEIGHT: 66 IN | DIASTOLIC BLOOD PRESSURE: 70 MMHG | BODY MASS INDEX: 31.5 KG/M2 | HEART RATE: 76 BPM

## 2018-11-29 VITALS — HEIGHT: 66 IN | TEMPERATURE: 98.7 F | BODY MASS INDEX: 31.34 KG/M2 | WEIGHT: 195 LBS

## 2018-11-29 VITALS
OXYGEN SATURATION: 98 % | SYSTOLIC BLOOD PRESSURE: 108 MMHG | HEART RATE: 70 BPM | RESPIRATION RATE: 16 BRPM | HEIGHT: 66 IN | WEIGHT: 198 LBS | DIASTOLIC BLOOD PRESSURE: 64 MMHG | BODY MASS INDEX: 31.82 KG/M2

## 2018-11-29 VITALS
HEART RATE: 80 BPM | WEIGHT: 198 LBS | SYSTOLIC BLOOD PRESSURE: 108 MMHG | OXYGEN SATURATION: 95 % | BODY MASS INDEX: 31.96 KG/M2 | DIASTOLIC BLOOD PRESSURE: 62 MMHG

## 2018-11-29 VITALS
RESPIRATION RATE: 16 BRPM | SYSTOLIC BLOOD PRESSURE: 130 MMHG | HEART RATE: 68 BPM | OXYGEN SATURATION: 98 % | TEMPERATURE: 96 F | DIASTOLIC BLOOD PRESSURE: 72 MMHG

## 2018-12-06 DIAGNOSIS — F51.04 PSYCHOPHYSIOLOGICAL INSOMNIA: ICD-10-CM

## 2018-12-06 RX ORDER — ALPRAZOLAM 0.25 MG/1
0.25 TABLET ORAL NIGHTLY PRN
Qty: 30 TABLET | Refills: 0 | OUTPATIENT
Start: 2018-12-06 | End: 2019-01-07

## 2018-12-06 NOTE — TELEPHONE ENCOUNTER
Pt requesting refill of Xanax, no protocol, unable to approve:     Last Time Medication was Filled:  11/3/18    Last Office Visit with PCP: 11/2/18    Future Appointments   Date Time Provider Edmond Warren   12/10/2018  9:00 AM Makenzie Garcia RN, CD

## 2018-12-06 NOTE — TELEPHONE ENCOUNTER
Pt walked in today for his appointment that was cancelled by his daughter Angely. Pt is requesting for xanax medication to be sent to [address]. I offered pt an appt for today, pt cannot not make it.  Today is his wife's . Pt asked to call his

## 2018-12-10 ENCOUNTER — MED REC SCAN ONLY (OUTPATIENT)
Dept: ENDOCRINOLOGY CLINIC | Facility: CLINIC | Age: 68
End: 2018-12-10

## 2018-12-10 NOTE — PROGRESS NOTES
After learning that Fritz Delgado is not licensed to distribute in [place], sent order to [employer] # [phone]; confirmation received, sent to scan.

## 2018-12-11 ENCOUNTER — TELEPHONE (OUTPATIENT)
Dept: ANTICOAGULATION | Age: 68
End: 2018-12-11

## 2018-12-13 ENCOUNTER — TELEPHONE (OUTPATIENT)
Dept: ENDOCRINOLOGY CLINIC | Facility: CLINIC | Age: 68
End: 2018-12-13

## 2018-12-13 RX ORDER — CETIRIZINE HYDROCHLORIDE 10 MG/1
10 TABLET ORAL DAILY
Qty: 90 TABLET | Refills: 0 | Status: SHIPPED | OUTPATIENT
Start: 2018-12-13 | End: 2019-08-15

## 2018-12-13 NOTE — TELEPHONE ENCOUNTER
[address] [place] [address] & found out his order was cancelled; as of 12/5/18, the company no longer ships to [facility]. [address] supplies @ [phone].  [place] informed me that as of 11/18, they are still waiting on approval from his insuran

## 2018-12-14 ENCOUNTER — NURSE ONLY (OUTPATIENT)
Dept: ENDOCRINOLOGY CLINIC | Facility: CLINIC | Age: 68
End: 2018-12-14
Payer: MEDICARE

## 2018-12-14 VITALS — WEIGHT: 204 LBS | BODY MASS INDEX: 32.78 KG/M2 | HEIGHT: 66 IN

## 2018-12-14 DIAGNOSIS — Z79.4 TYPE 2 DIABETES MELLITUS WITH HYPERGLYCEMIA, WITH LONG-TERM CURRENT USE OF INSULIN (HCC): Primary | ICD-10-CM

## 2018-12-14 DIAGNOSIS — E11.65 TYPE 2 DIABETES MELLITUS WITH HYPERGLYCEMIA, WITH LONG-TERM CURRENT USE OF INSULIN (HCC): Primary | ICD-10-CM

## 2018-12-14 PROCEDURE — 95250 CONT GLUC MNTR PHYS/QHP EQP: CPT

## 2018-12-14 RX ORDER — FLASH GLUCOSE SENSOR
1 KIT MISCELLANEOUS
Qty: 2 EACH | Refills: 11 | Status: SHIPPED | OUTPATIENT
Start: 2018-12-14 | End: 2019-10-21

## 2018-12-14 NOTE — PROGRESS NOTES
Beto Hooper  RICARDO 1916 was seen for Diagnostic Continuous Glucose Sensor Initial Evaluation:    Date: 2018  Start time: 10am End time: 10:30am      Gutierrez 14 day Personal  Sensor Lot: [id number]  Expiration: 2019  Sensor placed: left arm

## 2018-12-15 RX ORDER — CETIRIZINE HYDROCHLORIDE 10 MG/1
10 TABLET ORAL DAILY
Qty: 30 TABLET | Refills: 0 | Status: SHIPPED | OUTPATIENT
Start: 2018-12-15

## 2018-12-15 RX ORDER — CETIRIZINE HYDROCHLORIDE 10 MG/1
10 TABLET ORAL
COMMUNITY
Start: 2018-05-21 | End: 2018-12-15 | Stop reason: SDUPTHER

## 2018-12-17 ENCOUNTER — OFFICE VISIT (OUTPATIENT)
Dept: ENDOCRINOLOGY CLINIC | Facility: CLINIC | Age: 68
End: 2018-12-17
Payer: MEDICARE

## 2018-12-17 ENCOUNTER — TELEPHONE (OUTPATIENT)
Dept: ENDOCRINOLOGY CLINIC | Facility: CLINIC | Age: 68
End: 2018-12-17

## 2018-12-17 VITALS
TEMPERATURE: 98 F | DIASTOLIC BLOOD PRESSURE: 68 MMHG | RESPIRATION RATE: 20 BRPM | SYSTOLIC BLOOD PRESSURE: 104 MMHG | HEIGHT: 66 IN | HEART RATE: 66 BPM | WEIGHT: 205 LBS | BODY MASS INDEX: 32.95 KG/M2

## 2018-12-17 DIAGNOSIS — Z79.4 TYPE 2 DIABETES MELLITUS WITH HYPERGLYCEMIA, WITH LONG-TERM CURRENT USE OF INSULIN (HCC): Primary | ICD-10-CM

## 2018-12-17 DIAGNOSIS — E11.65 TYPE 2 DIABETES MELLITUS WITH HYPERGLYCEMIA, WITH LONG-TERM CURRENT USE OF INSULIN (HCC): Primary | ICD-10-CM

## 2018-12-17 PROCEDURE — 82962 GLUCOSE BLOOD TEST: CPT | Performed by: NURSE PRACTITIONER

## 2018-12-17 PROCEDURE — 99214 OFFICE O/P EST MOD 30 MIN: CPT | Performed by: NURSE PRACTITIONER

## 2018-12-17 RX ORDER — SERTRALINE HYDROCHLORIDE 100 MG/1
TABLET, FILM COATED ORAL
Qty: 90 TABLET | Refills: 0 | Status: SHIPPED | OUTPATIENT
Start: 2018-12-17 | End: 2019-01-07

## 2018-12-17 NOTE — TELEPHONE ENCOUNTER
LMCPY2 prior auth via CoverMyMeds sent to plan today for ALEX [facility] 14-day sensors as pt already has the reader.

## 2018-12-17 NOTE — PROGRESS NOTES
Patient presents with:  Diabetes: room-14 cf. pt forgot meter. HPI:   Oly Postal is a 77year old male presents for type 2  DM medication management. He presents today with Rollo Counter, his usual caregiver. reports his wife passed away 2 weeks ago.  Not eating book today    DM associated symptoms:   Polyuria, polyphagia, polydipsia: yes  Paresthesias: yes  Blurred vision: yes  Hypoglycemia: no    DM Complications:    Microvascular:   Neuropathy: yes  Retinopathy: no  Nephropathy: no- 11/2018     Macrovascular  P Known Problems Son        Current Outpatient Medications:  cetirizine 10 MG Oral Tab Take 1 tablet (10 mg total) by mouth daily.  Disp: 90 tablet Rfl: 0   Insulin Lispro (HUMALOG KWIKPEN) 100 UNIT/ML Subcutaneous Solution Pen-injector INJECT 6 TO 14 UNITS B daily. Disp:  Rfl:    atorvastatin 20 MG Oral Tab Take 10 mg by mouth nightly. Disp:  Rfl:    ticagrelor (BRILINTA) 90 MG Oral Tab Take 90 mg by mouth every 12 (twelve) hours. Disp:  Rfl:    Montelukast Sodium 10 MG Oral Tab Take 10 mg by mouth nightly.  Lester Fischer Gutierrez BALL Disp: 1 Device Rfl: 0   HYDROcodone-acetaminophen 5-325 MG Oral Tab TK 1 T PO Q 8 H PRN O Disp:  Rfl: 0   EPINEPHrine (EPIPEN 2-GIOVANI) 0.3 MG/0.3ML Injection Solution Auto-injector Inject 1 applicator as directed as needed.  Disp:  Rfl:      Review Tresiba to 45 units once daily   ~will change to U 200 insulin due to increase in dosing   Increase Humalog ; see scale  If not eating a meal, do not take the meal time insulin   Advised pt/caretaker it is best to dose the insulin 15 min prior to eating or

## 2018-12-17 NOTE — TELEPHONE ENCOUNTER
Pt requesting refill of Sertraline, no protocol, unable to approve:     Last Time Medication was Filled:  11/2/18    Last Office Visit with PCP: 11/2/18    Future Appointments   Date Time Provider Edmond Warren   1/4/2019  9:30 AM Jarett Rodriguez RN,

## 2018-12-17 NOTE — ASSESSMENT & PLAN NOTE
BG trends still not at goal   Reviewed eGFR: ok to start SGLT2 I class to use along with MDI/Metformin rx   Jardiance 10mg once daily    #14 samples and rx sent to pharmacy   Discussed risk/benefits of SGLT2 ; watch for uti or jock itch rash/symptoms   Adv

## 2018-12-17 NOTE — PATIENT INSTRUCTIONS
Start Jardiance 10 mg once daily   This will help remove extra sugar from the blood into urine  Call if any urinary problems: burning or rash in groin area develop   Drink at least 6 - 8 glasses of water daily dehyudrated.      Tresiba U100 : increase to  4

## 2018-12-18 NOTE — TELEPHONE ENCOUNTER
Since he is HMO, we need to do a referral ~   Carissa Bethea lets hold off for now since we dont have this info yet

## 2019-01-01 ENCOUNTER — EXTERNAL RECORD (OUTPATIENT)
Dept: HEALTH INFORMATION MANAGEMENT | Facility: OTHER | Age: 69
End: 2019-01-01

## 2019-01-04 ENCOUNTER — NURSE ONLY (OUTPATIENT)
Dept: ENDOCRINOLOGY CLINIC | Facility: CLINIC | Age: 69
End: 2019-01-04
Payer: MEDICARE

## 2019-01-04 VITALS
HEIGHT: 66 IN | BODY MASS INDEX: 31.98 KG/M2 | DIASTOLIC BLOOD PRESSURE: 79 MMHG | HEART RATE: 61 BPM | WEIGHT: 199 LBS | SYSTOLIC BLOOD PRESSURE: 138 MMHG

## 2019-01-04 DIAGNOSIS — Z79.4 TYPE 2 DIABETES MELLITUS WITH HYPERGLYCEMIA, WITH LONG-TERM CURRENT USE OF INSULIN (HCC): Primary | ICD-10-CM

## 2019-01-04 DIAGNOSIS — E11.65 TYPE 2 DIABETES MELLITUS WITH HYPERGLYCEMIA, WITH LONG-TERM CURRENT USE OF INSULIN (HCC): Primary | ICD-10-CM

## 2019-01-04 PROCEDURE — G0108 DIAB MANAGE TRN  PER INDIV: HCPCS

## 2019-01-07 ENCOUNTER — TELEPHONE (OUTPATIENT)
Dept: INTERNAL MEDICINE CLINIC | Facility: CLINIC | Age: 69
End: 2019-01-07

## 2019-01-07 ENCOUNTER — TELEPHONE (OUTPATIENT)
Dept: FAMILY MEDICINE CLINIC | Facility: CLINIC | Age: 69
End: 2019-01-07

## 2019-01-07 DIAGNOSIS — I10 ESSENTIAL HYPERTENSION, BENIGN: ICD-10-CM

## 2019-01-07 DIAGNOSIS — F33.1 MODERATE EPISODE OF RECURRENT MAJOR DEPRESSIVE DISORDER (HCC): ICD-10-CM

## 2019-01-07 DIAGNOSIS — F51.04 PSYCHOPHYSIOLOGICAL INSOMNIA: ICD-10-CM

## 2019-01-07 RX ORDER — TRAZODONE HYDROCHLORIDE 50 MG/1
TABLET ORAL
Qty: 90 TABLET | Refills: 0 | OUTPATIENT
Start: 2019-01-07

## 2019-01-07 RX ORDER — ALPRAZOLAM 0.25 MG/1
TABLET ORAL
Qty: 30 TABLET | Refills: 0 | OUTPATIENT
Start: 2019-01-07

## 2019-01-07 RX ORDER — CARVEDILOL 6.25 MG/1
TABLET ORAL
Qty: 180 TABLET | Refills: 0 | OUTPATIENT
Start: 2019-01-07

## 2019-01-07 NOTE — TELEPHONE ENCOUNTER
L/M on daughter cell phone that we found her dad's cell phone in the waiting room  I put the phone in the cash box

## 2019-01-07 NOTE — PATIENT INSTRUCTIONS
· .  Gerardo Mallory will contact you with counseling referral and possible psychiatric referral if we are unable to control your depression with sertraline. I recommend you follow-up with a counselor given your group great recent loss.   · Please increase ser causas? Los eventos que desencadenan un trastorno de adaptación varían de Pinkey Spina persona a otra. En los adultos, el trastorno podría ser el resultado de dificultades laborales, económicas o matrimoniales.  Los adolescentes suelen verse afectados por problemas psicológica, iraida el objetivo de todas las terapias es cambiar la perspectiva que usted tiene sobre noel problema. La terapia para la depresión suele hacerse en sesiones individuales, aunque también se puede hacer en angelo.  Puede consultar con noel proveedor d alcoholismo o la drogadicción. Date Last Reviewed: 1/19/2015  © 7726-1186 [employer]. [address] [facility] [address] [place] [address]. Todos los derechos reservados.  Esta información no pretende sustituir la atención médica profesional. Sólo noel m excelente de cuidar noel cuerpo y los estudios médicos indican que también ayuda a combatir la depresión. · Evite las drogas y el alcohol. Es posible que le proporcionen alivio a corto plazo iraida, a betty plazo, simplemente agravarán doc problemas.   · Trate bren:  · National Suicide Prevention Eavgrtak358-002-6719 (WeekendScores.is. org  · [employer] StagedNews.  · [employer] on Mental https://eddi.org/. org  · Mental Hea

## 2019-01-07 NOTE — PROGRESS NOTES
CHIEF COMPLAINT: Patient presents with:  Medication Follow-Up: sleep  Follow - Up: Diabetes and depression       HPI:     Nandini Durham is a 77year old male presents for recheck depression and diabetes. Feels very down.   Has loss of appetite, poor sle artery aneurysm, left ([facility] [address] 8/25/2016    [place] repair at [facility] Jane Wills/Raj 2/22/2017   • TIA (transient ischemic attack) 8/25/2016   • Type II or unspecified type diabetes mellitus without mention of complication, not stated as uncontrolled    • Unsp mouth daily. Disp: 90 tablet Rfl: 0   ALPRAZolam 0.25 MG Oral Tab Take 1 tablet (0.25 mg total) by mouth nightly as needed for Sleep.  Disp: 30 tablet Rfl: 0   Blood Glucose Monitoring Suppl (ACCU-CHEK GUIDE) w/Device Does not apply Kit USE TO TEST UTD Disp gabapentin (NEURONTIN) 300 MG Oral Cap Take 300 mg by mouth 3 (three) times daily. Disp:  Rfl:    nitroGLYCERIN (NITROSTAT) 0.4 MG Sublingual SL Tab Place 0.4 mg under the tongue every 5 (five) minutes as needed for Chest pain.  Disp:  Rfl:    simvastatin Right arm, Patient Position: Sitting, Cuff Size: adult)   Pulse 99   Temp 97.6 °F (36.4 °C) (Oral)   Resp 20   Wt 208 lb   SpO2 96%   BMI 33.57 kg/m²   Vital signs reviewed. Appears stated age, well groomed.   Physical Exam   GENERAL: well developed, well no 11/08/2018 138    • Potassium 11/08/2018 3.7    • Chloride 11/08/2018 102    • CO2 11/08/2018 28.0    • Anion Gap 11/08/2018 8    • BUN 11/08/2018 10    • Creatinine 11/08/2018 1.08    • BUN/CREA Ratio 11/08/2018 9.3*   • Calcium, Total 11/08/2018 8.7    • follow-up with diabetic clinic. Diabetic Foot exam performed and importance of daily foot checks discussed. If any ulcers or lesions or sores needs to be seen immediately. Patient understands.   - HEPATITIS B VACCINE, OVER 20  - MetFORMIN HCl 1000 MG Ora homicidal, call 911 or go to nearest ER and call office  -increased suicide risk on SSRI or SNRI in past  Exercise 3 x weekly x 30-60min  If unable to improve depression on sertraline and trazodone, refer to psychiatry  - INCREASE Sertraline HCl 100 MG Ora Sig: Apply 1 Application topically daily. • Isosorbide Mononitrate ER 30 MG Oral Tablet 24 Hr 90 tablet 0     Sig: Take 1 tablet (30 mg total) by mouth daily.    • Ranolazine ER (RANEXA) 1000 MG Oral Tablet 12 Hr 180 tablet 1     Sig: Take 1 tablet (1 AAA (abdominal aortic aneurysm) (HCC)     Generalized anxiety disorder     BPH with obstruction/lower urinary tract symptoms     Cardiomyopathy, ischemic     Coronary artery disease involving coronary bypass graft of native heart without angina pectoris

## 2019-01-08 ENCOUNTER — TELEPHONE (OUTPATIENT)
Dept: GASTROENTEROLOGY | Age: 69
End: 2019-01-08

## 2019-01-08 NOTE — TELEPHONE ENCOUNTER
Spoke to [address] [place] [address],[address] with Dr Charly Wolfusing office. She stated they did not have a prior request for colonoscopy report. States will send a new request for colonoscopy, EGD, and capsul study reports to be sent to our office.

## 2019-01-09 ENCOUNTER — TELEPHONE (OUTPATIENT)
Dept: FAMILY MEDICINE CLINIC | Facility: CLINIC | Age: 69
End: 2019-01-09

## 2019-01-09 PROBLEM — E11.40 UNCONTROLLED TYPE 2 DIABETES WITH NEUROPATHY (HCC): Status: ACTIVE | Noted: 2019-01-09

## 2019-01-09 PROBLEM — E11.65 UNCONTROLLED TYPE 2 DIABETES WITH NEUROPATHY (HCC): Status: ACTIVE | Noted: 2019-01-09

## 2019-01-09 PROBLEM — IMO0002 UNCONTROLLED TYPE 2 DIABETES WITH NEUROPATHY: Status: ACTIVE | Noted: 2019-01-09

## 2019-01-09 NOTE — TELEPHONE ENCOUNTER
Patient signed HIPAA medical records authorization form for the Facility identified below to disclose patient health information to Segundo Navarro / Provider Name: [id number] Address: [address] [place] [address].  [facility]

## 2019-01-10 NOTE — TELEPHONE ENCOUNTER
Laine, PT's caregiver will be picking up phone. Got a message from Pt's daughter Laurel Milligan that it was here and ready to be picked up.

## 2019-01-14 ENCOUNTER — OFFICE VISIT (OUTPATIENT)
Dept: ENDOCRINOLOGY CLINIC | Facility: CLINIC | Age: 69
End: 2019-01-14
Payer: MEDICARE

## 2019-01-14 VITALS
BODY MASS INDEX: 33.43 KG/M2 | DIASTOLIC BLOOD PRESSURE: 60 MMHG | TEMPERATURE: 98 F | SYSTOLIC BLOOD PRESSURE: 100 MMHG | RESPIRATION RATE: 20 BRPM | HEIGHT: 66 IN | WEIGHT: 208 LBS | HEART RATE: 86 BPM

## 2019-01-14 DIAGNOSIS — Z79.4 TYPE 2 DIABETES MELLITUS WITH HYPERGLYCEMIA, WITH LONG-TERM CURRENT USE OF INSULIN (HCC): ICD-10-CM

## 2019-01-14 DIAGNOSIS — E11.65 TYPE 2 DIABETES MELLITUS WITH HYPERGLYCEMIA, WITH LONG-TERM CURRENT USE OF INSULIN (HCC): ICD-10-CM

## 2019-01-14 PROCEDURE — 99214 OFFICE O/P EST MOD 30 MIN: CPT | Performed by: NURSE PRACTITIONER

## 2019-01-14 NOTE — PATIENT INSTRUCTIONS
Your trends are better   The A1C will be updated at your next visit next month     Continue Metformin 1000mg twice daily   Continue Jardiance 10mg once daily   Decrease  Tresiba to 36 units once daily   Humalog ; see scale  If not eating a meal, do not anjali

## 2019-01-14 NOTE — TELEPHONE ENCOUNTER
[address] phone [phone] and waited for 20 min. Was told was the next caller in the que then was transferred to a voice mail so left a message to please call me at [phone] and update me about the status of his [facility].

## 2019-01-14 NOTE — PROGRESS NOTES
Patient presents with:  Diabetes: room-16 cf. pt did bring meter. HPI:   Maritza Thao is a 77year old male presenting with type 2 DM medication management. In the past 3 week his BG trends have been stable.  Rufuselisa Ascencioler was started at last visit; denies any u Value   11/08/2018 11 (L)     Alt (U/L)   Date Value   11/08/2018 19      Malb/Cre Calc   Date Value Ref Range Status   11/08/2018 10.0 <=30.0 ug/mg Final     Creatinine   Date Value Ref Range Status   11/08/2018 1.08 0.70 - 1.30 mg/dL Final     GFR, Non-A Packs/day: 0.50        Years: 20.00        Pack years: 8        Quit date: 12        Years since quittin.0      Smokeless tobacco: Never Used      Tobacco comment: quit     Alcohol use: No    Drug use: No    Family History   Problem Relation tablet (10 mg total) by mouth daily.  Disp: 90 tablet Rfl: 0   Blood Glucose Monitoring Suppl (ACCU-CHEK GUIDE) w/Device Does not apply Kit USE TO TEST UTD Disp:  Rfl: 0   Insulin Pen Needle (BD PEN NEEDLE KEKE U/F) 32G X 4 MM Does not apply Misc 4 injectio (fourteen) days. Disp: 2 each Rfl: 11   Continuous Blood Gluc  (FREESTYLE DEIDRA 14 DAY READER) Does not apply Device 1 Device by Does not apply route every 14 (fourteen) days.  Disp: 2 Device Rfl: 3   Continuous Blood Gluc  (Larisa [place] lower trends early am , will decrease basal   Tresiba to 36 units once daily (from 40)   Continue :   Jardiance 10mg once daily    Continue Metformin 1000mg twice daily   Humalog U 200 insulin due to increase in dosing   Reminded pt/caretaker : If not [place]

## 2019-01-16 NOTE — TELEPHONE ENCOUNTER
Never did receive a call back from [address] [place] [address]. Called them today about the status of his Chelorosalia Gauthier order. They stated they have left messages for him at the 847# listed with no return calls.   They also stated they needed the last two office visit notes which were

## 2019-01-18 ENCOUNTER — TELEPHONE (OUTPATIENT)
Dept: ENDOCRINOLOGY CLINIC | Facility: CLINIC | Age: 69
End: 2019-01-18

## 2019-01-18 NOTE — TELEPHONE ENCOUNTER
Would prefer pt to stay on Humalog U 200 due to higher doses needed at meals.    For now, ok to rx Novolog Flex pen (same doses) but can we start prior auth for Humalog U 200

## 2019-01-18 NOTE — TELEPHONE ENCOUNTER
Received letter from [address] [place] [address] requesting formulary alternatives to Humalog [place] such as either Novolog Flexpen ;or [employer]. Please advise.

## 2019-01-21 NOTE — TELEPHONE ENCOUNTER
Approval received from [address] [place] [address] for U200 Humalog x 1 year. Sent fax to [facility] to notify them. Confirmation fax received.

## 2019-01-21 NOTE — TELEPHONE ENCOUNTER
Records received, no colonoscopy report on file. Date of 2016 with a repeat in 3 years colonoscopy per records. No result notes or provider names.

## 2019-01-21 NOTE — TELEPHONE ENCOUNTER
Received note from [facility] with clinical questions as to why he needs Humalog U200. Answered/returned fax. Confirmation received. Await decision.

## 2019-01-24 ENCOUNTER — IMAGING SERVICES (OUTPATIENT)
Dept: OTHER | Age: 69
End: 2019-01-24

## 2019-01-29 ENCOUNTER — TELEPHONE (OUTPATIENT)
Dept: INTERNAL MEDICINE CLINIC | Facility: CLINIC | Age: 69
End: 2019-01-29

## 2019-01-29 DIAGNOSIS — E11.65 DM TYPE 2, UNCONTROLLED, WITH NEUROPATHY (HCC): Primary | ICD-10-CM

## 2019-01-29 DIAGNOSIS — F33.1 MODERATE EPISODE OF RECURRENT MAJOR DEPRESSIVE DISORDER (HCC): ICD-10-CM

## 2019-01-29 DIAGNOSIS — E11.40 DM TYPE 2, UNCONTROLLED, WITH NEUROPATHY (HCC): Primary | ICD-10-CM

## 2019-01-29 RX ORDER — BLOOD-GLUCOSE METER
1 EACH MISCELLANEOUS ONCE
Qty: 1 KIT | Refills: 0 | Status: SHIPPED | OUTPATIENT
Start: 2019-01-29 | End: 2019-01-29

## 2019-01-29 RX ORDER — TRAZODONE HYDROCHLORIDE 50 MG/1
TABLET ORAL
Qty: 30 TABLET | Refills: 0 | OUTPATIENT
Start: 2019-01-29

## 2019-01-29 NOTE — TELEPHONE ENCOUNTER
Pt requests a refill of trazadone, no protocol. LOV with PCP 1/7/19  LF 1/7/19 #30    Pt has an appt 2/7/19 for medication monitoring, MD will address any further refills at this appt. LMOM to return call to the office. Provided pt office phone [phone] along with office hours given.   Left a message with pts daughter Monty Carey that medication was denied due to pt returning to [address] [place] [address] with PCP 2/4/19 to address medication monitoring

## 2019-01-29 NOTE — TELEPHONE ENCOUNTER
Patient's caregiver,Laine called because when she arrived at patient's home today his Accuchek Meter was not working. Please send for a new one to the [facility]'s on [place]. Any questions, contact caregiver.

## 2019-01-30 PROBLEM — I73.9 PVD (PERIPHERAL VASCULAR DISEASE) (CMD): Status: ACTIVE | Noted: 2018-06-19

## 2019-01-30 RX ORDER — UREA 40 %
CREAM (GRAM) TOPICAL
COMMUNITY
Start: 2018-04-11 | End: 2020-06-29

## 2019-01-30 RX ORDER — NAPROXEN SODIUM 220 MG
TABLET ORAL
COMMUNITY
Start: 2017-04-06 | End: 2020-06-29

## 2019-01-30 RX ORDER — LIDOCAINE 50 MG/G
PATCH TOPICAL
COMMUNITY
Start: 2017-11-01 | End: 2020-06-29

## 2019-01-30 RX ORDER — MECLIZINE HCL 25MG 25 MG/1
TABLET, CHEWABLE ORAL
COMMUNITY
Start: 2017-12-21 | End: 2020-06-29

## 2019-01-30 RX ORDER — TAMSULOSIN HYDROCHLORIDE 0.4 MG/1
CAPSULE ORAL
COMMUNITY
Start: 2017-12-08

## 2019-01-30 RX ORDER — ISOSORBIDE MONONITRATE 30 MG/1
TABLET, EXTENDED RELEASE ORAL
COMMUNITY
Start: 2018-01-29 | End: 2020-06-29

## 2019-01-30 RX ORDER — PANTOPRAZOLE SODIUM 40 MG/1
TABLET, DELAYED RELEASE ORAL
COMMUNITY
Start: 2018-11-27 | End: 2020-06-29

## 2019-01-30 RX ORDER — NITROGLYCERIN 0.4 MG/1
TABLET SUBLINGUAL
COMMUNITY
Start: 2018-05-04 | End: 2020-06-29

## 2019-01-30 RX ORDER — ATORVASTATIN CALCIUM 20 MG/1
TABLET, FILM COATED ORAL
COMMUNITY
Start: 2020-06-29

## 2019-01-30 RX ORDER — GABAPENTIN 300 MG/1
CAPSULE ORAL
COMMUNITY
Start: 2018-09-28 | End: 2019-02-12 | Stop reason: SDUPTHER

## 2019-01-30 RX ORDER — ZOLPIDEM TARTRATE 10 MG/1
TABLET ORAL
COMMUNITY
Start: 2017-06-26 | End: 2020-06-29

## 2019-01-30 RX ORDER — CARVEDILOL 6.25 MG/1
TABLET ORAL
COMMUNITY
Start: 2018-07-17 | End: 2020-06-29

## 2019-01-30 RX ORDER — LIDOCAINE 50 MG/G
OINTMENT TOPICAL
COMMUNITY
Start: 2017-11-27 | End: 2020-06-29

## 2019-01-30 RX ORDER — LANCETS
EACH MISCELLANEOUS
COMMUNITY
Start: 2020-06-29

## 2019-01-30 RX ORDER — SYRINGE-NEEDLE,INSULIN,0.5 ML 27GX1/2"
SYRINGE, EMPTY DISPOSABLE MISCELLANEOUS
COMMUNITY
End: 2019-01-30 | Stop reason: CLARIF

## 2019-01-30 RX ORDER — BLOOD-GLUCOSE METER
EACH MISCELLANEOUS
COMMUNITY
Start: 2020-06-29

## 2019-01-30 RX ORDER — KETOCONAZOLE 20 MG/G
CREAM TOPICAL
COMMUNITY
Start: 2017-11-01 | End: 2020-06-29

## 2019-01-30 RX ORDER — RANOLAZINE 1000 MG/1
TABLET, EXTENDED RELEASE ORAL
COMMUNITY
Start: 2017-09-12 | End: 2020-06-29

## 2019-01-30 RX ORDER — OXYBUTYNIN CHLORIDE 5 MG/1
TABLET ORAL
COMMUNITY
Start: 2018-10-02 | End: 2019-09-16 | Stop reason: ALTCHOICE

## 2019-01-30 RX ORDER — SERTRALINE HYDROCHLORIDE 100 MG/1
TABLET, FILM COATED ORAL
COMMUNITY
Start: 2018-07-16 | End: 2020-06-29 | Stop reason: DRUGHIGH

## 2019-01-30 RX ORDER — ONDANSETRON 4 MG/1
TABLET, ORALLY DISINTEGRATING ORAL
COMMUNITY
Start: 2020-06-29

## 2019-01-30 RX ORDER — HYDROCODONE BITARTRATE AND ACETAMINOPHEN 5; 325 MG/1; MG/1
TABLET ORAL
COMMUNITY
Start: 2018-07-10 | End: 2019-09-16 | Stop reason: ALTCHOICE

## 2019-01-30 RX ORDER — ASPIRIN 81 MG/1
TABLET, CHEWABLE ORAL
COMMUNITY
Start: 2020-06-29

## 2019-01-31 ENCOUNTER — APPOINTMENT (OUTPATIENT)
Dept: CARDIOLOGY | Age: 69
End: 2019-01-31

## 2019-02-02 ENCOUNTER — TELEPHONE (OUTPATIENT)
Dept: ENDOCRINOLOGY CLINIC | Facility: CLINIC | Age: 69
End: 2019-02-02

## 2019-02-04 ENCOUNTER — EXTERNAL RECORD (OUTPATIENT)
Dept: CARDIOLOGY | Age: 69
End: 2019-02-04

## 2019-02-04 ENCOUNTER — OFFICE VISIT (OUTPATIENT)
Dept: FAMILY MEDICINE CLINIC | Facility: CLINIC | Age: 69
End: 2019-02-04
Payer: MEDICARE

## 2019-02-04 VITALS
TEMPERATURE: 98 F | WEIGHT: 200.63 LBS | OXYGEN SATURATION: 97 % | HEART RATE: 87 BPM | DIASTOLIC BLOOD PRESSURE: 66 MMHG | BODY MASS INDEX: 32 KG/M2 | SYSTOLIC BLOOD PRESSURE: 114 MMHG

## 2019-02-04 DIAGNOSIS — Z95.5 S/P CORONARY ARTERY STENT PLACEMENT: ICD-10-CM

## 2019-02-04 DIAGNOSIS — M54.50 LUMBAR BACK PAIN: ICD-10-CM

## 2019-02-04 DIAGNOSIS — I10 ESSENTIAL HYPERTENSION, BENIGN: ICD-10-CM

## 2019-02-04 DIAGNOSIS — E11.40 UNCONTROLLED TYPE 2 DIABETES WITH NEUROPATHY (HCC): ICD-10-CM

## 2019-02-04 DIAGNOSIS — F33.1 MODERATE EPISODE OF RECURRENT MAJOR DEPRESSIVE DISORDER (HCC): ICD-10-CM

## 2019-02-04 DIAGNOSIS — E11.65 UNCONTROLLED TYPE 2 DIABETES WITH NEUROPATHY (HCC): ICD-10-CM

## 2019-02-04 DIAGNOSIS — K55.20 AVM (ARTERIOVENOUS MALFORMATION) OF COLON WITHOUT HEMORRHAGE: ICD-10-CM

## 2019-02-04 DIAGNOSIS — I71.4 ABDOMINAL AORTIC ANEURYSM (AAA) WITHOUT RUPTURE (HCC): ICD-10-CM

## 2019-02-04 DIAGNOSIS — I20.0 UNSTABLE ANGINA (HCC): Primary | ICD-10-CM

## 2019-02-04 LAB
GLUCOSE BLOOD: 211
TEST STRIP LOT #: NORMAL NUMERIC
TROPONIN: 0.01 NG/ML

## 2019-02-04 PROCEDURE — 82962 GLUCOSE BLOOD TEST: CPT | Performed by: FAMILY MEDICINE

## 2019-02-04 PROCEDURE — 93000 ELECTROCARDIOGRAM COMPLETE: CPT | Performed by: FAMILY MEDICINE

## 2019-02-04 PROCEDURE — 99215 OFFICE O/P EST HI 40 MIN: CPT | Performed by: FAMILY MEDICINE

## 2019-02-04 RX ORDER — TRAZODONE HYDROCHLORIDE 100 MG/1
TABLET ORAL
Qty: 90 TABLET | Refills: 0 | OUTPATIENT
Start: 2019-02-04

## 2019-02-04 RX ORDER — SERTRALINE HYDROCHLORIDE 100 MG/1
200 TABLET, FILM COATED ORAL DAILY
Qty: 180 TABLET | Refills: 0 | Status: SHIPPED | OUTPATIENT
Start: 2019-02-04 | End: 2019-02-18

## 2019-02-04 RX ORDER — TRAZODONE HYDROCHLORIDE 100 MG/1
100 TABLET ORAL NIGHTLY
Qty: 30 TABLET | Refills: 0 | Status: SHIPPED | OUTPATIENT
Start: 2019-02-04 | End: 2019-02-18

## 2019-02-04 NOTE — PROGRESS NOTES
CHIEF COMPLAINT: Patient presents with:  Medication Follow-Up  Diabetes      HPI:     Peter Miles is a 77year old male presents for recheck depression and diabetes. Feels down-feels improved on 150 mg of sertraline.   Still not sleeping on trazodone monitor and is not checking blood sugars. Denies any low blood sugars. Lost 8 pounds since last visit. Admits not eating very often. Has been depressed. Complains of low back pain low lumbar midline nonradiating times 3 weeks came on insidiously.   I Use      Smoking status: Former Smoker        Packs/day: 0.50        Years: 20.00        Pack years: 8        Quit date:         Years since quittin.1      Smokeless tobacco: Never Used      Tobacco comment: quit     Alcohol use: No    Drug u tablet (0.25 mg total) by mouth nightly as needed for Sleep. Disp: 30 tablet Rfl: 0   cetirizine 10 MG Oral Tab Take 1 tablet (10 mg total) by mouth daily.  Disp: 90 tablet Rfl: 0   HYDROcodone-acetaminophen 5-325 MG Oral Tab TK 1 T PO Q 8 H PRN O Disp:  Rf Allergies:    Lisinopril              SWELLING, ANGIOEDEMA    Comment:Severe reaction/hospitalized  Radiology Contrast *    ANGIOEDEMA    Comment:Exacerbation of tongue/lip angioedema following IV             contrast for abdominal CT 4/2017.     [facility] is grossly intact. No resting tremor. Back: Tenderness mid spinal L4/5/S1 no paraspinal tenderness or pack spasm. Negative straight leg raising test.  No CVA tenderness. No thoracic spine tenderness or paraspinal tenderness.   LABS     ECG: Sinus rhythm 's group?  beh health referral placed  -contracts for safety- if suicidal or homicidal, call 911 or go to nearest ER and call office  -increased suicide risk on SSRI or SNRI in past  Exercise 3 x weekly x 30-60min  If unable to improve depression on Completed  Pneumococcal PPSV23/PCV13 65+ Years / Low and Medium Risk Completed      Patient/Caregiver Education: Patient/Caregiver Education: There are no barriers to learning. Medical education done. Outcome: Patient verbalizes understanding.  Patient is

## 2019-02-04 NOTE — PATIENT INSTRUCTIONS
As of October 6th 2014, the Drug Enforcement Agency [facility]) is reclassifying all hydrocodone combination medications from Schedule III to Schedule II. This includes medications such as Norco, Vicodin, Lortab, Zohydro, and Vicoprofen.      What this means for chart. La angina inestable     La angina inestable se produce a causa de la enfermedad de las arterias coronarias (EAC). La angina inestable por lo general es causada por mandy enfermedad de las arterias coronarias.  La angina es mandy sensación de [place], siempre trey el ejercicio o en momentos de estrés. La angina estable desaparece trey el reposo o al kenzie nitroglicerina, un medicamento que ayuda a relajar el músculo cardíaco y a aumentar el flujo de arik por las arterias. · Angina inestable.  Es rápidamente varias pruebas, ilan las siguientes:  · Análisis de [place]. Pueden ayudar a detectar si hay daños en el corazón. Puede hacerse de manera repetida, por lo general cada 6 a 8 horas hasta kirstin completado 3.  También pueden medir noel colesterol en l procedimiento que aumenta el flujo sanguíneo al [employer] cardíaco, ilan mandy angioplastia o mandy cirugía de chisholm coronario (bypass). Noel proveedor de [employer] dará más información sobre estos tratamientos, si son necesarios.   · Cambios en noel estilo mareos, desmayos, sudoración profusa, náuseas o vómito. · Dura más de 5 minutos. · Siente ilan si Raul Pop. Date Last Reviewed: 3/7/2014  © 1865-4469 [employer]. [address] [facility], [address] [place] [address] [place].  Todos los derechos r a veces no se cuidan nidia, lo cual aumenta las probabilidades de que se enfermen. Date Last Reviewed: 1/26/2012  © 8096-3845 [employer] [address]. [address] [facility], [facility]. Todos los derechos reservados.  Esta información no pretende obtenga ayuda y [de-identified] de inmediato llamando a mandy clínica psiquiátrica o a mandy línea telefónica de atención para crisis suicidas, abierta las 24 horas del día. Busque el teléfono bajo \"Suicide\" en las páginas catalina de la guía telefónica.  En aamir de chanell

## 2019-02-04 NOTE — TELEPHONE ENCOUNTER
PT requesting 90 supply of trazadone, refill denied.  Pt has f/u with medication, dosage may change    Future Appointments   Date Time Provider Edmond Warren   2/11/2019  1:00 PM TRICIA Magaña [facility]   2/15/2019 10:30 AM Aaron

## 2019-02-05 ENCOUNTER — EXTERNAL RECORD (OUTPATIENT)
Dept: OTHER | Age: 69
End: 2019-02-05

## 2019-02-07 ENCOUNTER — TELEPHONE (OUTPATIENT)
Dept: SCHEDULING | Age: 69
End: 2019-02-07

## 2019-02-07 ENCOUNTER — TELEPHONE (OUTPATIENT)
Dept: FAMILY MEDICINE CLINIC | Facility: CLINIC | Age: 69
End: 2019-02-07

## 2019-02-07 NOTE — TELEPHONE ENCOUNTER
LMOM to return call to the office. Provided pt office phone [phone] along with office hours given. Left detailed message only blood thinner on med list is asa, is that med need refill for?

## 2019-02-08 NOTE — TELEPHONE ENCOUNTER
LMOM to return call to the office as this is my 2nd attempt to try to reach you. Provided pt office phone [phone] along with office hours given.

## 2019-02-11 ENCOUNTER — OFFICE VISIT (OUTPATIENT)
Dept: ENDOCRINOLOGY CLINIC | Facility: CLINIC | Age: 69
End: 2019-02-11
Payer: MEDICARE

## 2019-02-11 VITALS
SYSTOLIC BLOOD PRESSURE: 138 MMHG | BODY MASS INDEX: 31.98 KG/M2 | HEART RATE: 80 BPM | HEIGHT: 66 IN | RESPIRATION RATE: 16 BRPM | DIASTOLIC BLOOD PRESSURE: 64 MMHG | WEIGHT: 199 LBS | TEMPERATURE: 98 F

## 2019-02-11 DIAGNOSIS — E11.40 DM TYPE 2, UNCONTROLLED, WITH NEUROPATHY (HCC): Primary | ICD-10-CM

## 2019-02-11 DIAGNOSIS — E11.65 DM TYPE 2, UNCONTROLLED, WITH NEUROPATHY (HCC): Primary | ICD-10-CM

## 2019-02-11 PROBLEM — IMO0002 UNCONTROLLED TYPE 2 DIABETES WITH NEUROPATHY: Status: RESOLVED | Noted: 2019-01-09 | Resolved: 2019-02-11

## 2019-02-11 LAB
CARTRIDGE LOT#: 974 NUMERIC
HEMOGLOBIN A1C: 8 % (ref 4.3–5.6)

## 2019-02-11 PROCEDURE — 83036 HEMOGLOBIN GLYCOSYLATED A1C: CPT | Performed by: NURSE PRACTITIONER

## 2019-02-11 PROCEDURE — 99214 OFFICE O/P EST MOD 30 MIN: CPT | Performed by: NURSE PRACTITIONER

## 2019-02-11 RX ORDER — TICAGRELOR 60 MG/1
TABLET ORAL
COMMUNITY
Start: 2019-02-08 | End: 2019-08-15

## 2019-02-11 RX ORDER — BLOOD-GLUCOSE METER
EACH MISCELLANEOUS
Refills: 0 | COMMUNITY
Start: 2019-01-29

## 2019-02-11 NOTE — PATIENT INSTRUCTIONS
A1C : 8.9% (down from 11%)   This is really good; good job.    Once you change to [facility], call us and we can see if they cover sensor at lower out of pocket cost     VIT B 1000mcg daily ; this may help with foot neuropathy     Continue:   Tresiba  36 units

## 2019-02-11 NOTE — TELEPHONE ENCOUNTER
Spoke with [employer] at:   [email or web address] [phone]     They said they have left messages for both caregiver Anabelle Dudley and daughter Juan Manuel. They need:  1) a copay before they can ship.  If pt needs the reader the copay will be

## 2019-02-11 NOTE — PROGRESS NOTES
Patient presents with:  Diabetes: follow up      HPI:   Bebe Spears is a 77year old male presenting with type 2 DM medication management.  In the past 3 m his DM control has improved : a1c today 8.0%  ( last A1C 11.0%)     Pt is primarily Nepali speaking but l 11/2018      Macrovascular  PVD: yes  CAD: yes  Stroke/CVA: no but hx TIA      Modifying factors:  Testing 4 x daily   Following MDI dose adherence.        Modifying factors:  Medication adherence: yes   Nutrition  Variable appetite in PM    Exercise: activ mg total) by mouth daily. Disp: 180 tablet Rfl: 0   TraZODone HCl 100 MG Oral Tab Take 1 tablet (100 mg total) by mouth nightly. Disp: 30 tablet Rfl: 0   Empagliflozin (JARDIANCE) 10 MG Oral Tab Take 10 mg by mouth daily.  Disp: 90 tablet Rfl: 0   Insulin L Does not apply Device Dispense 1 Freestyle reader for Gutierrez CGM Disp: 1 Device Rfl: 0   HYDROcodone-acetaminophen 5-325 MG Oral Tab TK 1 T PO Q 8 H PRN O Disp:  Rfl: 0   Insulin Pen Needle (BD PEN NEEDLE KEKE U/F) 32G X 4 MM Does not apply Misc 4 injection patient is not nervous/anxious. + insomnia         Physical exam:  /64   Pulse 80   Temp 97.9 °F (36.6 °C) (Oral)   Resp 16   Ht 66\"   Wt 199 lb   BMI 32.12 kg/m²   Body mass index is 32.12 kg/m². Physical Exam   Vitals reviewed.    Constitu of insulin   If blood sugar is 301-350, take 22 units of insulin   If blood sugar is > 351, take 24 units of insulin   Continue SMBG 4 x daily   Reminded of hypoglycemia s/s and tx   Continue insulin site rotation   Due to long term Metformin use, recommen

## 2019-02-12 RX ORDER — GABAPENTIN 300 MG/1
CAPSULE ORAL
Qty: 270 CAPSULE | Refills: 0 | Status: SHIPPED | OUTPATIENT
Start: 2019-02-12

## 2019-02-12 NOTE — TELEPHONE ENCOUNTER
LMOM to return call to the office as this was my 3rd and final attempt to try to reach you. Provided pt office phone [phone] along with office hours given. Also, mailed patient a letter to contact office. Closing encounter.     Pt asking refill for

## 2019-02-15 ENCOUNTER — OFFICE VISIT (OUTPATIENT)
Dept: SURGERY | Facility: CLINIC | Age: 69
End: 2019-02-15
Payer: MEDICARE

## 2019-02-15 ENCOUNTER — APPOINTMENT (OUTPATIENT)
Dept: LAB | Age: 69
End: 2019-02-15
Attending: UROLOGY
Payer: MEDICARE

## 2019-02-15 VITALS
HEART RATE: 78 BPM | BODY MASS INDEX: 31.98 KG/M2 | SYSTOLIC BLOOD PRESSURE: 130 MMHG | TEMPERATURE: 98 F | HEIGHT: 66 IN | DIASTOLIC BLOOD PRESSURE: 66 MMHG | WEIGHT: 199 LBS

## 2019-02-15 DIAGNOSIS — R39.15 URINARY URGENCY: ICD-10-CM

## 2019-02-15 DIAGNOSIS — R39.15 URINARY URGENCY: Primary | ICD-10-CM

## 2019-02-15 LAB
BILIRUB UR QL STRIP.AUTO: NEGATIVE
CLARITY UR REFRACT.AUTO: CLEAR
COLOR UR AUTO: YELLOW
GLUCOSE UR STRIP.AUTO-MCNC: >=500 MG/DL
KETONES UR STRIP.AUTO-MCNC: NEGATIVE MG/DL
LEUKOCYTE ESTERASE UR QL STRIP.AUTO: NEGATIVE
NITRITE UR QL STRIP.AUTO: NEGATIVE
PH UR STRIP.AUTO: 6 [PH] (ref 4.5–8)
PROT UR STRIP.AUTO-MCNC: NEGATIVE MG/DL
RBC UR QL AUTO: NEGATIVE
SP GR UR STRIP.AUTO: 1.02 (ref 1–1.03)
UROBILINOGEN UR STRIP.AUTO-MCNC: <2 MG/DL

## 2019-02-15 PROCEDURE — 87086 URINE CULTURE/COLONY COUNT: CPT

## 2019-02-15 PROCEDURE — 81003 URINALYSIS AUTO W/O SCOPE: CPT

## 2019-02-15 PROCEDURE — 99204 OFFICE O/P NEW MOD 45 MIN: CPT | Performed by: UROLOGY

## 2019-02-15 RX ORDER — ALPRAZOLAM 0.25 MG/1
TABLET ORAL
Qty: 30 TABLET | Refills: 0 | Status: CANCELLED | OUTPATIENT
Start: 2019-02-15

## 2019-02-15 NOTE — TELEPHONE ENCOUNTER
Pt requesting refill of alprazolam, no protocol , unable to approve:     Last Time Medication was Filled:  1/7/19 #30    Last Office Visit with PCP: 2/4/19    Future Appointments   Date Time Provider Edmond Warren   2/18/2019 12:40 PM Shravan Beckham DO [facility]   4/16/2019 11:00 AM [facility]ons [facility]   5/13/2019 11:15 AM Rufina Whelan, TRICIA [facility]

## 2019-02-15 NOTE — PROGRESS NOTES
SUBJECTIVE:  Homero Lomas is a 77year old male who presents for a consultation at the request of, and a copy of this note will be sent to, Dr. Stephen Limon, for evaluation of  benign prostatic hyperplasia and history of urethral stricture.  He states that th 4/2017.     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

## 2019-02-18 ENCOUNTER — TELEPHONE (OUTPATIENT)
Dept: SURGERY | Facility: CLINIC | Age: 69
End: 2019-02-18

## 2019-02-18 ENCOUNTER — OFFICE VISIT (OUTPATIENT)
Dept: FAMILY MEDICINE CLINIC | Facility: CLINIC | Age: 69
End: 2019-02-18
Payer: MEDICARE

## 2019-02-18 VITALS
BODY MASS INDEX: 32 KG/M2 | OXYGEN SATURATION: 98 % | DIASTOLIC BLOOD PRESSURE: 58 MMHG | HEART RATE: 73 BPM | RESPIRATION RATE: 18 BRPM | TEMPERATURE: 98 F | SYSTOLIC BLOOD PRESSURE: 90 MMHG | WEIGHT: 201 LBS

## 2019-02-18 DIAGNOSIS — I25.700 CORONARY ARTERY DISEASE INVOLVING CORONARY BYPASS GRAFT OF NATIVE HEART WITH UNSTABLE ANGINA PECTORIS (HCC): Primary | ICD-10-CM

## 2019-02-18 DIAGNOSIS — F33.1 MODERATE EPISODE OF RECURRENT MAJOR DEPRESSIVE DISORDER (HCC): ICD-10-CM

## 2019-02-18 PROCEDURE — 99213 OFFICE O/P EST LOW 20 MIN: CPT | Performed by: FAMILY MEDICINE

## 2019-02-18 RX ORDER — SERTRALINE HYDROCHLORIDE 100 MG/1
200 TABLET, FILM COATED ORAL DAILY
Qty: 180 TABLET | Refills: 0 | Status: SHIPPED | OUTPATIENT
Start: 2019-02-18 | End: 2019-08-15

## 2019-02-18 RX ORDER — TRAZODONE HYDROCHLORIDE 100 MG/1
100 TABLET ORAL NIGHTLY
Qty: 90 TABLET | Refills: 0 | Status: SHIPPED | OUTPATIENT
Start: 2019-02-18 | End: 2019-08-15

## 2019-02-18 NOTE — PROGRESS NOTES
CHIEF COMPLAINT: Patient presents with:  ER F/U: heart issues    HPI:     Nicholas Muñoz is a 77year old male presents for recheck depression and follow-up chest pain. Any more chest pain.   Last visit he was having left-sided chest pain and was sent to R abdominal pain. He said no fevers. No cough. No dyspnea on exertion. No swelling in the legs. Lost 8 pounds. No weight gain. Off Brilinta since summer 2018 per care everywhere was recommendation from cardiologist postprocedure .   Taking atorvastat tobacco: Never Used      Tobacco comment: quit 1976    Alcohol use: No    Drug use: No       Medications (Active prior to today's visit):    Current Outpatient Medications:  Blood Glucose Monitoring Suppl (ACCU-CHEK GUIDE) w/Device Does not apply Kit USE T Take 1 tablet (10 mg total) by mouth daily.  Disp: 90 tablet Rfl: 0   Insulin Pen Needle (BD PEN NEEDLE KEKE U/F) 32G X 4 MM Does not apply Misc 4 injections daily Disp: 200 Box Rfl: 1   Glucose Blood (ACCU-CHEK GUIDE) In Vitro Strip Test 3 x daily Disp: 10 complaint: Less depressed mood. Denies chest pain. Pertinent positives and negatives noted in the the HPI.     PHYSICAL EXAM:   BP 90/58 (BP Location: Right arm, Patient Position: Sitting, Cuff Size: adult)   Pulse 73   Temp 98.3 °F (36.8 °C) (Oral)   Res office  -increased suicide risk on SSRI   Exercise 3 x weekly x 30-60min  Stop Xanax due to risk of fall, sedation and addiction. Discussed with patient has trazodone to take for sleep not Xanax.   If unable to improve depression on sertraline and trazodon 180 tablet 0     Sig: Take 2 tablets (200 mg total) by mouth daily.        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   Return in about 1 month (around 3/18/2019) for Recheck depression etc.

## 2019-02-18 NOTE — TELEPHONE ENCOUNTER
MRN:[id number]                      After Visit Summary   11/15/2018    Mason Garcia    MRN: [id number]           Thank you!     Thank you for choosing [facility] for your care. Our goal is always to provide you with excellent care. Hearing back from our patients is one way we can continue to improve our services. Please take a few minutes to complete the written survey that you may receive in the mail after you visit with us. Thank you!        Patient Information     Date Of Birth          1987        About your hospital stay     You were admitted on:  November 15, 2018 You last received care in the: [facility]    You were discharged on:  November 21, 2018       Who to Call     For medical emergencies, please call 911.  For non-urgent questions about your medical care, please call your primary care provider or clinic, [phone]          Attending Provider     Provider Specialty    Tamica Scott PA-C Emergency Medicine    Giancarlo Lomas MD Psychiatry    July Barkley NP Licensed Mental Health       Primary Care Provider Office Phone # Fax #    Ruben Valencia [phone]      Further instructions from your care team       Behavioral Discharge Planning and Instructions     Summary: Mason was admitted to  with increased suicidal ideation      Main Diagnosis: Major Depressive Disorder, recurrent, moderate, PTSD, Panic Disorder w/O agoraphobia, R/O Other Bipolar Disorder, Cluster B Personality Traits     Major Treatments, Procedures and Findings: Stabilize with medications, connect with community programs.     Symptoms to Report: feeling more aggressive, increased confusion, losing more sleep, mood getting worse or thoughts of suicide     Lifestyle Adjustment: Take all medications as prescribed, meet with doctor/ medication provider, out patient therapist, , and [facility] worker as scheduled. Abstain from alcohol or any unprescribed  Discontinued at office visit today. Trazodone is helping patient sleep. Risk of fall and addiction and oversedation and injury to self discussed. Patient is now on Brilinta anticoagulant since [facility] overnight admission for chest pain. drugs.     Psychiatry Follow-up:      [facility]  Medication Management- Miriam Caballero- Dec 4th @ 3pm   [address] [place] [address]  [phone]  [phone]     [facility]   Therapy - Melia - November 26th @ 4pm, Please talk with your therapist about sending your most recent DA for [facility] worker   [address] [place] [address]  Phone: [phone] ext: [phone]  Fax: [phone]    [facility]  Case Management- Julissa Perez [phone]- As arranged   [address] [place] [address]  Phone:  [phone]   Fax - [phone]      [facility]  Aftercare groups- Tuesdays @ 3pm  5th Floor Room 546  [address] [facility] [address], [place] [address]  Phone: [phone] ext. [phone]  Fax: [phone]    24-hour Crisis Line [phone]    Options for [facility] Services:    [facility]  [address] [place] [address]  [phone]  [phone]    [address] [address]  [phone]  Fax: [phone]       Resources:   Crisis Intervention: 146.991.8444 or 097-163-4495 (TTY: 805.840.7330).  Call anytime for help.  National [place] on Mental Illness (www.mn.lou.org): 469.393.4510 or 440-784-4284.  Alcoholics Anonymous (www.alcoholics-anonymous.org): Check your phone book for your local chapter.  Suicide Awareness Voices of Education (SAVE) (www.save.org): 523-322-FOCV (2566)  National Suicide Prevention Line (www.mentalhealthmn.org): 765-766-YMBP (5465)  Mental Health Consumer/Survivor Network of [place] (www.mhcsn.net): 548.436.2768 or 090-131-8760  Mental Health Association of [place] (www.mentalhealth.org): 121.952.3578 or 098-024-0555     General Medication Instructions:   See your medication sheet(s) for instructions.   Take all medicines as directed.  Make no changes unless your doctor suggests them.   Go to all your doctor visits.  Be sure to have all your required lab tests. This way, your medicines can be refilled  "on time.  Do not use any drugs not prescribed by your doctor.  Avoid alcohol.     [place] Area:  [facility], Crisis stabilization [facility]- [phone]  [facility] Crisis Line: 1-836.570.6424  Advocates For Family Peace: 550-2294  Sexual Assault Program of [facility]: 664.767.3477 or 1-466.969.3318  [place] Battered Women's Program: 1-668.526.5077 Ext: 279       Calls answered Mon-Fri-8:00 am--4:30 pm     [place]:  Advocates for Family Peace: 1-400.704.8842  [facility] first call for help: 1-410.290.3322  [facility] Crisis Center:  (587) 810-4264        [place] Area:  Warm Line: 1-658.812.9803       Calls answered Tuesday--Saturday 4:00 pm--10:00 pm  [place] Crisis Line - 810.171.3110  [place] Crisis Stabilization 216-068-9157     [place] Statewide:  [place] Crisis and Referral Services: 1-459.745.2417  National Suicide Prevention Lifeline: 1-501-430-TALK (9882)   - vad7slnb- Text \"Life\" to 76917  First Call for Help: 2-1-1  [facility] Helpline- 5-981-KVCU-HELP         Pending Results     No orders found from 11/13/2018 to 11/16/2018.            Statement of Approval     Ordered          11/21/18 1301  I have reviewed and agree with all the recommendations and orders detailed in this document.  EFFECTIVE NOW     Approved and electronically signed by:  Radha Cortes NP             Admission Information     Date & Time Provider Department Dept. Phone    11/15/2018 July Barkley NP [facility] [phone]      Your Vitals Were     Blood Pressure Pulse Temperature Respirations Height Weight    118/76 90 97  F (36.1  C) (Tympanic) 16 1.854 m (6' 1\") 103.9 kg (229 lb)    Pulse Oximetry BMI (Body Mass Index)                98% 30.21 kg/m2          MyChart Information     [employer] lets you send messages to your doctor, view your test results, renew your prescriptions, schedule appointments and more. To sign up, go to [email or web address] . Click on \"Log in\" on the left side of the " "screen, which will take you to the Welcome page. Then click on \"Sign up Now\" on the right side of the page.     You will be asked to enter the access code listed below, as well as some personal information. Please follow the directions to create your username and password.     Your access code is: [email or web address]  Expires: 2019  1:03 PM     Your access code will  in 90 days. If you need help or a new code, please call your [facility] clinic or [phone].        Care EveryWhere ID     This is your Care EveryWhere ID. This could be used by other organizations to access your [facility] medical records  [id number]        Equal Access to Services     QUIN STUART : Vilma Wesley, mj martinez, cece olson, marcia pugh. So [facility] [phone].    ATENCIÓN: Si habla español, tiene a tobar disposición servicios gratuitos de asistencia lingüística. Llame al [phone].    We comply with applicable federal civil rights laws and [place] laws. We do not discriminate on the basis of race, color, national origin, age, disability, sex, sexual orientation, or gender identity.               Review of your medicines      START taking        Dose / Directions    lithium 300 MG CR tablet   Commonly known as:  ESKALITH/LITHOBID   Used for:  Severe episode of recurrent major depressive disorder, without psychotic features (H)        Dose:  300 mg   Take 1 tablet (300 mg) by mouth every 12 hours   Quantity:  60 tablet   Refills:  0       melatonin 1 MG Tabs tablet        Dose:  1 mg   Take 1 tablet (1 mg) by mouth At Bedtime   Quantity:  30 tablet   Refills:  0         CONTINUE these medicines which may have CHANGED, or have new prescriptions. If we are uncertain of the size of tablets/capsules you have at home, strength may be listed as something that might have changed.        Dose / Directions    venlafaxine 75 MG 24 hr capsule   Commonly known as:  EFFEXOR-XR   This " may have changed:  Another medication with the same name was removed. Continue taking this medication, and follow the directions you see here.   Used for:  Severe episode of recurrent major depressive disorder, without psychotic features (H)        Dose:  75 mg   Take 1 capsule (75 mg) by mouth daily   Quantity:  30 capsule   Refills:  0         CONTINUE these medicines which have NOT CHANGED        Dose / Directions    HYDROXYZINE HCL PO        Take by mouth every 4 hours as needed for itching   Refills:  0       prazosin 1 MG capsule   Commonly known as:  MINIPRESS   Used for:  PTSD (post-traumatic stress disorder)        Dose:  1 mg   Take 1 capsule (1 mg) by mouth At Bedtime   Quantity:  30 capsule   Refills:  0            Where to get your medicines      These medications were sent to [facility] - [place], [place] - [address] AT [facility] OF [facility] [address], [facility] [place] [address]     Phone:  [phone]     lithium 300 MG CR tablet                Protect others around you: Learn how to safely use, store and throw away your medicines at www.disposemymeds.org.             Medication List: This is a list of all your medications and when to take them. Check marks below indicate your daily home schedule. Keep this list as a reference.      Medications           Morning Afternoon Evening Bedtime As Needed    HYDROXYZINE HCL PO   Take by mouth every 4 hours as needed for itching   Last time this was given:  50 mg on 11/19/2018  8:57 PM                                lithium 300 MG CR tablet   Commonly known as:  ESKALITH/LITHOBID   Take 1 tablet (300 mg) by mouth every 12 hours   Last time this was given:  300 mg on 11/21/2018  8:27 AM                                melatonin 1 MG Tabs tablet   Take 1 tablet (1 mg) by mouth At Bedtime   Last time this was given:  1 mg on 11/20/2018  8:28 PM                                prazosin 1 MG capsule   Commonly known as:  MINIPRESS   Take 1 capsule  (1 mg) by mouth At Bedtime   Last time this was given:  1 mg on 11/20/2018  8:28 PM                                venlafaxine 75 MG 24 hr capsule   Commonly known as:  EFFEXOR-XR   Take 1 capsule (75 mg) by mouth daily   Last time this was given:  112.5 mg on 11/21/2018  8:27 AM

## 2019-02-18 NOTE — PATIENT INSTRUCTIONS
· Taking antidepressants can put you at risk of increased suicidal thoughts or depression  · If use feel suicidal or homicidal, call 911 or go to the nearest ER or call our office.     Asesoría psicológica para la depresión  Se ha demostrado que la asesoría Fidel recuerde [facility] tipo de terapia es muy útil en el tratamiento de la depresión. [facility] es un hecho demostrado. A menudo se establece un número determinado de sesiones para la terapia.  En otros casos, usted y noel terapeuta deciden cuándo ha llegado el mo

## 2019-02-18 NOTE — TELEPHONE ENCOUNTER
Another Diabetic Detailed Written Order from [facility] requested for test strips and also for lancets this time. CAB signed, faxed, fax confirmation received. Sent to scan.

## 2019-02-20 ENCOUNTER — TELEPHONE (OUTPATIENT)
Dept: FAMILY MEDICINE CLINIC | Facility: CLINIC | Age: 69
End: 2019-02-20

## 2019-02-20 NOTE — TELEPHONE ENCOUNTER
Patient signed HIPAA medical records authorization form for the Facility identified below to disclose patient health information to Segundo Navarro / Provider Name: [address] Address: [address].    Facility

## 2019-02-23 ENCOUNTER — HOSPITAL ENCOUNTER (EMERGENCY)
Facility: HOSPITAL | Age: 69
Discharge: HOME | End: 2019-02-23
Attending: EMERGENCY MEDICINE
Payer: MEDICARE

## 2019-02-23 VITALS
RESPIRATION RATE: 16 BRPM | HEART RATE: 80 BPM | OXYGEN SATURATION: 96 % | SYSTOLIC BLOOD PRESSURE: 126 MMHG | DIASTOLIC BLOOD PRESSURE: 69 MMHG | TEMPERATURE: 97 F

## 2019-02-23 DIAGNOSIS — L50.9 URTICARIAL RASH: Primary | ICD-10-CM

## 2019-02-23 LAB
ALBUMIN SERPL-MCNC: 3.2 G/DL (ref 3.4–5)
ALBUMIN/GLOB SERPL: 0.7 {RATIO} (ref 1–2)
ALP LIVER SERPL-CCNC: 94 U/L (ref 45–117)
ALT SERPL-CCNC: 25 U/L (ref 16–61)
ANION GAP SERPL CALC-SCNC: 8 MMOL/L (ref 0–18)
APTT PPP: 29.6 SECONDS (ref 26.1–34.6)
AST SERPL-CCNC: 19 U/L (ref 15–37)
BASOPHILS # BLD AUTO: 0.03 X10(3) UL (ref 0–0.2)
BASOPHILS NFR BLD AUTO: 0.4 %
BILIRUB SERPL-MCNC: 0.4 MG/DL (ref 0.1–2)
BUN BLD-MCNC: 14 MG/DL (ref 7–18)
BUN/CREAT SERPL: 12.6 (ref 10–20)
CALCIUM BLD-MCNC: 8.4 MG/DL (ref 8.5–10.1)
CHLORIDE SERPL-SCNC: 104 MMOL/L (ref 98–107)
CO2 SERPL-SCNC: 24 MMOL/L (ref 21–32)
CREAT BLD-MCNC: 1.11 MG/DL (ref 0.7–1.3)
CRP SERPL-MCNC: 2.21 MG/DL (ref ?–0.3)
DEPRECATED RDW RBC AUTO: 49.2 FL (ref 35.1–46.3)
EOSINOPHIL # BLD AUTO: 0.08 X10(3) UL (ref 0–0.7)
EOSINOPHIL NFR BLD AUTO: 1.1 %
ERYTHROCYTE [DISTWIDTH] IN BLOOD BY AUTOMATED COUNT: 17.7 % (ref 11–15)
GLOBULIN PLAS-MCNC: 4.4 G/DL (ref 2.8–4.4)
GLUCOSE BLD-MCNC: 179 MG/DL (ref 70–99)
HCT VFR BLD AUTO: 36.8 % (ref 39–53)
HGB BLD-MCNC: 11.7 G/DL (ref 13–17.5)
IMM GRANULOCYTES # BLD AUTO: 0.04 X10(3) UL (ref 0–1)
IMM GRANULOCYTES NFR BLD: 0.5 %
INR BLD: 1.03 (ref 0.9–1.1)
LYMPHOCYTES # BLD AUTO: 1.98 X10(3) UL (ref 1–4)
LYMPHOCYTES NFR BLD AUTO: 26.4 %
M PROTEIN MFR SERPL ELPH: 7.6 G/DL (ref 6.4–8.2)
MCH RBC QN AUTO: 24.4 PG (ref 26–34)
MCHC RBC AUTO-ENTMCNC: 31.8 G/DL (ref 31–37)
MCV RBC AUTO: 76.8 FL (ref 80–100)
MONOCYTES # BLD AUTO: 0.68 X10(3) UL (ref 0.1–1)
MONOCYTES NFR BLD AUTO: 9.1 %
NEUTROPHILS # BLD AUTO: 4.68 X10 (3) UL (ref 1.5–7.7)
NEUTROPHILS # BLD AUTO: 4.68 X10(3) UL (ref 1.5–7.7)
NEUTROPHILS NFR BLD AUTO: 62.5 %
OSMOLALITY SERPL CALC.SUM OF ELEC: 287 MOSM/KG (ref 275–295)
PLATELET # BLD AUTO: 182 10(3)UL (ref 150–450)
POTASSIUM SERPL-SCNC: 4.3 MMOL/L (ref 3.5–5.1)
PSA SERPL DL<=0.01 NG/ML-MCNC: 13.9 SECONDS (ref 12.4–14.7)
RBC # BLD AUTO: 4.79 X10(6)UL (ref 3.8–5.8)
SED RATE-ML: 30 MM/HR (ref 0–12)
SODIUM SERPL-SCNC: 136 MMOL/L (ref 136–145)
WBC # BLD AUTO: 7.5 X10(3) UL (ref 4–11)

## 2019-02-23 PROCEDURE — 36415 COLL VENOUS BLD VENIPUNCTURE: CPT

## 2019-02-23 PROCEDURE — 99283 EMERGENCY DEPT VISIT LOW MDM: CPT

## 2019-02-23 PROCEDURE — 80053 COMPREHEN METABOLIC PANEL: CPT | Performed by: EMERGENCY MEDICINE

## 2019-02-23 PROCEDURE — 85610 PROTHROMBIN TIME: CPT | Performed by: EMERGENCY MEDICINE

## 2019-02-23 PROCEDURE — 85652 RBC SED RATE AUTOMATED: CPT | Performed by: EMERGENCY MEDICINE

## 2019-02-23 PROCEDURE — 86140 C-REACTIVE PROTEIN: CPT | Performed by: EMERGENCY MEDICINE

## 2019-02-23 PROCEDURE — 85730 THROMBOPLASTIN TIME PARTIAL: CPT | Performed by: EMERGENCY MEDICINE

## 2019-02-23 PROCEDURE — 85025 COMPLETE CBC W/AUTO DIFF WBC: CPT | Performed by: EMERGENCY MEDICINE

## 2019-02-23 RX ORDER — PREDNISONE 20 MG/1
40 TABLET ORAL DAILY
Qty: 10 TABLET | Refills: 0 | Status: SHIPPED | OUTPATIENT
Start: 2019-02-23 | End: 2019-02-28

## 2019-02-23 RX ORDER — DIPHENHYDRAMINE HCL 25 MG
25 CAPSULE ORAL ONCE
Status: COMPLETED | OUTPATIENT
Start: 2019-02-23 | End: 2019-02-23

## 2019-02-23 RX ORDER — PREDNISONE 20 MG/1
60 TABLET ORAL ONCE
Status: COMPLETED | OUTPATIENT
Start: 2019-02-23 | End: 2019-02-23

## 2019-02-23 NOTE — ED PROVIDER NOTES
Patient Seen in: [facility] Emergency Department    History   Patient presents with:  Cellulitis (integumentary, infectious)    Stated Complaint: redness on legs    HPI    78-year-old male comes in the hospital complaint of having difficulty with a quit 1976    Alcohol use: No    Drug use: No      Review of Systems    Positive for stated complaint: redness on legs  Other systems are as noted in HPI. Constitutional and vital signs reviewed.       All other systems reviewed and negative except as noted WITH DIFFERENTIAL WITH PLATELET    Narrative: The following orders were created for panel order CBC WITH DIFFERENTIAL WITH PLATELET.   Procedure                               Abnormality         Status                     ---------

## 2019-02-23 NOTE — ED INITIAL ASSESSMENT (HPI)
Patient arrives with redness and pain the BLE that began on Tuesday. Patient recently restarted on blood thinners, unsure of name.

## 2019-02-25 ENCOUNTER — TELEPHONE (OUTPATIENT)
Dept: FAMILY MEDICINE CLINIC | Facility: CLINIC | Age: 69
End: 2019-02-25

## 2019-02-25 DIAGNOSIS — I10 ESSENTIAL HYPERTENSION, BENIGN: ICD-10-CM

## 2019-02-25 RX ORDER — CARVEDILOL 6.25 MG/1
TABLET ORAL
Qty: 90 TABLET | Refills: 0 | Status: SHIPPED | OUTPATIENT
Start: 2019-02-25 | End: 2019-04-09

## 2019-02-25 NOTE — TELEPHONE ENCOUNTER
Pt requesting refill of carvedilol, passed protocol , refill approved, sent to pharmacy:     Last Time Medication was Filled:  1/7/19 #90    Last Office Visit with PCP: 2/18/19    Future Appointments   Date Time Provider Edmond Warren   3/18/2019 12:

## 2019-02-27 ENCOUNTER — OFFICE VISIT (OUTPATIENT)
Dept: FAMILY MEDICINE CLINIC | Facility: CLINIC | Age: 69
End: 2019-02-27
Payer: MEDICARE

## 2019-02-27 VITALS
BODY MASS INDEX: 32.47 KG/M2 | HEIGHT: 66 IN | SYSTOLIC BLOOD PRESSURE: 116 MMHG | TEMPERATURE: 97 F | RESPIRATION RATE: 18 BRPM | HEART RATE: 76 BPM | WEIGHT: 202 LBS | OXYGEN SATURATION: 98 % | DIASTOLIC BLOOD PRESSURE: 70 MMHG

## 2019-02-27 DIAGNOSIS — E11.65 DM TYPE 2, UNCONTROLLED, WITH NEUROPATHY (HCC): ICD-10-CM

## 2019-02-27 DIAGNOSIS — I77.9 PERIPHERAL ARTERIAL OCCLUSIVE DISEASE (HCC): ICD-10-CM

## 2019-02-27 DIAGNOSIS — R21 RASH AND NONSPECIFIC SKIN ERUPTION: Primary | ICD-10-CM

## 2019-02-27 DIAGNOSIS — I71.4 ABDOMINAL AORTIC ANEURYSM (AAA) WITHOUT RUPTURE (HCC): ICD-10-CM

## 2019-02-27 DIAGNOSIS — I71.00 DISSECTION OF AORTA, UNSPECIFIED PORTION OF AORTA (HCC): ICD-10-CM

## 2019-02-27 DIAGNOSIS — Z95.5 S/P CORONARY ARTERY STENT PLACEMENT: ICD-10-CM

## 2019-02-27 DIAGNOSIS — E11.40 DM TYPE 2, UNCONTROLLED, WITH NEUROPATHY (HCC): ICD-10-CM

## 2019-02-27 DIAGNOSIS — I73.9 PERIPHERAL VASCULAR DISEASE (HCC): ICD-10-CM

## 2019-02-27 DIAGNOSIS — I25.700 CORONARY ARTERY DISEASE INVOLVING CORONARY BYPASS GRAFT OF NATIVE HEART WITH UNSTABLE ANGINA PECTORIS (HCC): ICD-10-CM

## 2019-02-27 PROCEDURE — 99214 OFFICE O/P EST MOD 30 MIN: CPT | Performed by: FAMILY MEDICINE

## 2019-02-27 NOTE — PATIENT INSTRUCTIONS
As of October 6th 2014, the Drug Enforcement Agency [facility]) is reclassifying all hydrocodone combination medications from Schedule III to Schedule II. This includes medications such as Norco, Vicodin, Lortab, Zohydro, and Vicoprofen.      What this means for dirty.    Bring Rx flucinolide from prior physician to dermatologist, Toi Parekh 3/4 visit at 3:30 PM- Brenda Causey    · Check blood sugars fasting before breakfast, and before each meal hours, if your accuchecks are greater than 350, call your primary docto

## 2019-02-27 NOTE — PROGRESS NOTES
CHIEF COMPLAINT: Patient presents with:  ER F/U: Seen at [facility] ER on 2/23/2019 for rash on legs    HPI:     Beto Hooper is a 77year old male presents for FU ER visit for rash developed on right leg greater than left 6 days ago-last Thursday, 2/21/201 ENDOVASC, INFRARENAL ABDOM AORTIC ANEURYSM/DISSECT; AORTO-ILIAC/FEMORAL UNI PROSTHESIS  02/22/2017    left leg      Family History   Problem Relation Age of Onset   • Diabetes Sister    • Heart Disorder Sister    • Diabetes Brother    • Heart Disorder Brot mg total) by mouth 2 (two) times daily. Disp: 180 tablet Rfl: 1   Oxybutynin Chloride 5 MG Oral Tab Take 1 tablet (5 mg total) by mouth daily.  Disp: 90 tablet Rfl: 0   MetFORMIN HCl 1000 MG Oral Tab TK 1 T PO BID WC Disp: 180 tablet Rfl: 0   Pantoprazole S Gutierrez CORIN Disp: 1 Device Rfl: 0   HYDROcodone-acetaminophen 5-325 MG Oral Tab TK 1 T PO Q 8 H PRN O Disp:  Rfl: 0   Metoclopramide HCl 10 MG Oral Tab Take 10 mg by mouth 3 (three) times daily before meals.  Disp:  Rfl:        Allergies:    Lisinopril Glucose 02/23/2019 179*   • Sodium 02/23/2019 136    • Potassium 02/23/2019 4.3    • Chloride 02/23/2019 104    • CO2 02/23/2019 24.0    • Anion Gap 02/23/2019 8    • BUN 02/23/2019 14    • Creatinine 02/23/2019 1.11    • BUN/CREA Ratio 02/23/2019 12.6 Protein Urine 02/15/2019 Negative    • Urobilinogen Urine 02/15/2019 <2.0    • Nitrite Urine 02/15/2019 Negative    • Leukocyte Esterase Urine 02/15/2019 Negative    • Microscopic 02/15/2019 Microscopic not indicated    Office Visit on 02/11/2019   Kwasie weeks ago. I have requested they call Dr. Sunita Bourne office and inquire if he needs to continue this medication. Never seen prescribed both oral and cream nitroglycerin.       Meds This Visit:  Requested Prescriptions      No prescriptions requested or ordere loss     Obesity     Neuropathy involving both lower extremities     Peripheral arterial occlusive disease (HCC)     Retention of urine     S/P coronary artery stent placement     Chronic pain of lower extremity, bilateral     AVM (arteriovenous malformati

## 2019-03-01 ENCOUNTER — APPOINTMENT (OUTPATIENT)
Dept: CT IMAGING | Facility: HOSPITAL | Age: 69
End: 2019-03-01
Attending: EMERGENCY MEDICINE
Payer: MEDICARE

## 2019-03-01 ENCOUNTER — HOSPITAL ENCOUNTER (EMERGENCY)
Facility: HOSPITAL | Age: 69
Discharge: ANOTHER HOSPITAL | End: 2019-03-02
Attending: EMERGENCY MEDICINE
Payer: MEDICARE

## 2019-03-01 VITALS
RESPIRATION RATE: 10 BRPM | HEART RATE: 84 BPM | SYSTOLIC BLOOD PRESSURE: 142 MMHG | OXYGEN SATURATION: 97 % | DIASTOLIC BLOOD PRESSURE: 97 MMHG | BODY MASS INDEX: 34 KG/M2 | WEIGHT: 212.75 LBS | TEMPERATURE: 98 F

## 2019-03-01 DIAGNOSIS — I62.9 INTRACRANIAL HEMORRHAGE (HCC): Primary | ICD-10-CM

## 2019-03-01 LAB
ALBUMIN SERPL-MCNC: 3.7 G/DL (ref 3.4–5)
ALBUMIN/GLOB SERPL: 0.8 {RATIO} (ref 1–2)
ALP LIVER SERPL-CCNC: 95 U/L (ref 45–117)
ALT SERPL-CCNC: 25 U/L (ref 16–61)
ANION GAP SERPL CALC-SCNC: 6 MMOL/L (ref 0–18)
APTT PPP: 25.7 SECONDS (ref 26.1–34.6)
AST SERPL-CCNC: 15 U/L (ref 15–37)
BASOPHILS # BLD AUTO: 0.01 X10(3) UL (ref 0–0.2)
BASOPHILS NFR BLD AUTO: 0.1 %
BILIRUB SERPL-MCNC: 0.4 MG/DL (ref 0.1–2)
BUN BLD-MCNC: 19 MG/DL (ref 7–18)
BUN/CREAT SERPL: 16.7 (ref 10–20)
CALCIUM BLD-MCNC: 9 MG/DL (ref 8.5–10.1)
CHLORIDE SERPL-SCNC: 98 MMOL/L (ref 98–107)
CO2 SERPL-SCNC: 29 MMOL/L (ref 21–32)
CREAT BLD-MCNC: 1.14 MG/DL (ref 0.7–1.3)
DEPRECATED RDW RBC AUTO: 48.7 FL (ref 35.1–46.3)
EOSINOPHIL # BLD AUTO: 0 X10(3) UL (ref 0–0.7)
EOSINOPHIL NFR BLD AUTO: 0 %
ERYTHROCYTE [DISTWIDTH] IN BLOOD BY AUTOMATED COUNT: 18.2 % (ref 11–15)
GLOBULIN PLAS-MCNC: 4.5 G/DL (ref 2.8–4.4)
GLUCOSE BLD-MCNC: 183 MG/DL (ref 70–99)
GLUCOSE BLD-MCNC: 191 MG/DL (ref 70–99)
HCT VFR BLD AUTO: 39 % (ref 39–53)
HGB BLD-MCNC: 12.5 G/DL (ref 13–17.5)
IMM GRANULOCYTES # BLD AUTO: 0.1 X10(3) UL (ref 0–1)
IMM GRANULOCYTES NFR BLD: 1.2 %
INR BLD: 0.98 (ref 0.9–1.1)
LYMPHOCYTES # BLD AUTO: 1.76 X10(3) UL (ref 1–4)
LYMPHOCYTES NFR BLD AUTO: 20.4 %
M PROTEIN MFR SERPL ELPH: 8.2 G/DL (ref 6.4–8.2)
MCH RBC QN AUTO: 24.4 PG (ref 26–34)
MCHC RBC AUTO-ENTMCNC: 32.1 G/DL (ref 31–37)
MCV RBC AUTO: 76 FL (ref 80–100)
MONOCYTES # BLD AUTO: 0.64 X10(3) UL (ref 0.1–1)
MONOCYTES NFR BLD AUTO: 7.4 %
NEUTROPHILS # BLD AUTO: 6.1 X10 (3) UL (ref 1.5–7.7)
NEUTROPHILS # BLD AUTO: 6.1 X10(3) UL (ref 1.5–7.7)
NEUTROPHILS NFR BLD AUTO: 70.9 %
OSMOLALITY SERPL CALC.SUM OF ELEC: 283 MOSM/KG (ref 275–295)
PLATELET # BLD AUTO: 270 10(3)UL (ref 150–450)
POTASSIUM SERPL-SCNC: 4.3 MMOL/L (ref 3.5–5.1)
PSA SERPL DL<=0.01 NG/ML-MCNC: 13.4 SECONDS (ref 12.5–14.7)
RBC # BLD AUTO: 5.13 X10(6)UL (ref 3.8–5.8)
SODIUM SERPL-SCNC: 133 MMOL/L (ref 136–145)
WBC # BLD AUTO: 8.6 X10(3) UL (ref 4–11)

## 2019-03-01 PROCEDURE — 99291 CRITICAL CARE FIRST HOUR: CPT | Performed by: EMERGENCY MEDICINE

## 2019-03-01 PROCEDURE — 93010 ELECTROCARDIOGRAM REPORT: CPT | Performed by: EMERGENCY MEDICINE

## 2019-03-01 PROCEDURE — 99292 CRITICAL CARE ADDL 30 MIN: CPT | Performed by: EMERGENCY MEDICINE

## 2019-03-01 PROCEDURE — 93005 ELECTROCARDIOGRAM TRACING: CPT

## 2019-03-01 PROCEDURE — 85025 COMPLETE CBC W/AUTO DIFF WBC: CPT | Performed by: EMERGENCY MEDICINE

## 2019-03-01 PROCEDURE — 96365 THER/PROPH/DIAG IV INF INIT: CPT | Performed by: EMERGENCY MEDICINE

## 2019-03-01 PROCEDURE — 85730 THROMBOPLASTIN TIME PARTIAL: CPT | Performed by: EMERGENCY MEDICINE

## 2019-03-01 PROCEDURE — 85610 PROTHROMBIN TIME: CPT | Performed by: EMERGENCY MEDICINE

## 2019-03-01 PROCEDURE — 70450 CT HEAD/BRAIN W/O DYE: CPT | Performed by: EMERGENCY MEDICINE

## 2019-03-01 PROCEDURE — 96375 TX/PRO/DX INJ NEW DRUG ADDON: CPT | Performed by: EMERGENCY MEDICINE

## 2019-03-01 PROCEDURE — 80053 COMPREHEN METABOLIC PANEL: CPT | Performed by: EMERGENCY MEDICINE

## 2019-03-01 PROCEDURE — 96368 THER/DIAG CONCURRENT INF: CPT | Performed by: EMERGENCY MEDICINE

## 2019-03-01 PROCEDURE — 82962 GLUCOSE BLOOD TEST: CPT

## 2019-03-01 RX ORDER — LABETALOL HYDROCHLORIDE 5 MG/ML
20 INJECTION, SOLUTION INTRAVENOUS ONCE
Status: COMPLETED | OUTPATIENT
Start: 2019-03-01 | End: 2019-03-01

## 2019-03-01 RX ORDER — LABETALOL HYDROCHLORIDE 5 MG/ML
INJECTION, SOLUTION INTRAVENOUS
Status: COMPLETED
Start: 2019-03-01 | End: 2019-03-01

## 2019-03-01 RX ORDER — ONDANSETRON 2 MG/ML
4 INJECTION INTRAMUSCULAR; INTRAVENOUS ONCE
Status: DISCONTINUED | OUTPATIENT
Start: 2019-03-01 | End: 2019-03-02

## 2019-03-01 RX ORDER — ONDANSETRON 2 MG/ML
INJECTION INTRAMUSCULAR; INTRAVENOUS
Status: COMPLETED
Start: 2019-03-01 | End: 2019-03-01

## 2019-03-01 RX ORDER — ONDANSETRON 2 MG/ML
4 INJECTION INTRAMUSCULAR; INTRAVENOUS ONCE
Status: COMPLETED | OUTPATIENT
Start: 2019-03-01 | End: 2019-03-01

## 2019-03-01 RX ORDER — MORPHINE SULFATE 4 MG/ML
2 INJECTION, SOLUTION INTRAMUSCULAR; INTRAVENOUS ONCE
Status: COMPLETED | OUTPATIENT
Start: 2019-03-01 | End: 2019-03-01

## 2019-03-02 LAB
ATRIAL RATE: 59 BPM
P AXIS: 57 DEGREES
P-R INTERVAL: 150 MS
Q-T INTERVAL: 418 MS
QRS DURATION: 88 MS
QTC CALCULATION (BEZET): 413 MS
R AXIS: 67 DEGREES
T AXIS: 56 DEGREES
VENTRICULAR RATE: 59 BPM

## 2019-03-02 NOTE — ED NOTES
Patient began to vomit while in CT with [facility]. Zofran to CT and Dr Franco Aceves aware.

## 2019-03-02 NOTE — PROGRESS NOTES
Stroke Navigator Note:      Called to ER A6 for Code Stroke paged at 2100. Arrived to dept at 2105 and met pt in stroke launch pad.     Pt's Pmhx include HTN, DM2, TIA, CAD with CABG and stenting on   Brelinta     Upon arrival found patient with NIH of 2

## 2019-03-02 NOTE — ED NOTES
Report called to [address] [facility] at [facility], excepting MD is Preet Su, room number 663

## 2019-03-02 NOTE — ED PROVIDER NOTES
Patient Seen in: [facility] Emergency Department    History   Patient presents with:  Stroke (neurologic)    Stated Complaint: stroke    HPI    Patient is a 68-year-old male comes in emergency room for evaluation of strokelike symptoms.   Patient was HPI.  Constitutional and vital signs reviewed. All other systems reviewed and negative except as noted above.     Physical Exam     ED Triage Vitals   BP 03/01/19 2107 (!) 186/101   Pulse 03/01/19 2107 70   Resp 03/01/19 2107 15   Temp 03/01/19 2107 98 PLATELET.   Procedure                               Abnormality         Status                     ---------                               -----------         ------                     CBC W/ DIFFERENTIAL[[id number]]          Abnormal            Final resul moderate right maxillary mucosal thickening. The remainder of the visualized paranasal sinuses and mastoid air cells are unremarkable.        CONCLUSION:  There is a acute parenchymal hemorrhage centered in the right basal ganglia extending into the right and this procedure. Family willing to transfer to [facility]. Patient started on Cardene drip while here. Given IV Keppra for seizure prophylaxis. Maintaining his airway. Given dose of labetalol prior to transfer to [facility].   Critical care time

## 2019-03-02 NOTE — ED NOTES
[facility] in [facility] was called so that transfer can be arranged. Transfer RN to talk to Dr. Magaly Garcia. Dr. Rashawn Ruano spoke with Darnell Hyatt earlier. To fax over face sheet and paperwork.

## 2019-03-02 NOTE — ED INITIAL ASSESSMENT (HPI)
Patient arrives from home per EMS for evaluation of left sided weakness and facial droop.  Last know normal 1600

## 2019-03-02 NOTE — ED NOTES
[facility] 30 Transfer RN Nilesh Brewer called back for report from East ward on patient transferring out.

## 2019-03-05 VITALS
BODY MASS INDEX: 33.43 KG/M2 | HEIGHT: 66 IN | SYSTOLIC BLOOD PRESSURE: 102 MMHG | DIASTOLIC BLOOD PRESSURE: 60 MMHG | HEART RATE: 76 BPM | WEIGHT: 208 LBS

## 2019-03-06 VITALS
DIASTOLIC BLOOD PRESSURE: 70 MMHG | HEIGHT: 66 IN | SYSTOLIC BLOOD PRESSURE: 154 MMHG | WEIGHT: 208 LBS | OXYGEN SATURATION: 98 % | HEART RATE: 76 BPM | BODY MASS INDEX: 33.43 KG/M2

## 2019-03-06 VITALS
HEIGHT: 66 IN | SYSTOLIC BLOOD PRESSURE: 122 MMHG | DIASTOLIC BLOOD PRESSURE: 74 MMHG | HEART RATE: 74 BPM | OXYGEN SATURATION: 97 % | WEIGHT: 209 LBS | BODY MASS INDEX: 33.59 KG/M2 | RESPIRATION RATE: 14 BRPM

## 2019-03-06 VITALS
WEIGHT: 206 LBS | SYSTOLIC BLOOD PRESSURE: 100 MMHG | OXYGEN SATURATION: 96 % | HEART RATE: 73 BPM | DIASTOLIC BLOOD PRESSURE: 60 MMHG | HEIGHT: 66 IN | BODY MASS INDEX: 33.11 KG/M2 | RESPIRATION RATE: 16 BRPM

## 2019-03-06 VITALS
HEART RATE: 72 BPM | HEIGHT: 66 IN | DIASTOLIC BLOOD PRESSURE: 70 MMHG | BODY MASS INDEX: 33.43 KG/M2 | WEIGHT: 208 LBS | SYSTOLIC BLOOD PRESSURE: 132 MMHG

## 2019-03-06 VITALS
HEIGHT: 66 IN | WEIGHT: 208 LBS | OXYGEN SATURATION: 97 % | DIASTOLIC BLOOD PRESSURE: 70 MMHG | SYSTOLIC BLOOD PRESSURE: 122 MMHG | HEART RATE: 70 BPM | BODY MASS INDEX: 33.43 KG/M2

## 2019-03-06 VITALS — WEIGHT: 203 LBS | BODY MASS INDEX: 32.62 KG/M2 | HEIGHT: 66 IN | TEMPERATURE: 98.5 F

## 2019-03-06 VITALS
DIASTOLIC BLOOD PRESSURE: 72 MMHG | HEART RATE: 80 BPM | SYSTOLIC BLOOD PRESSURE: 134 MMHG | OXYGEN SATURATION: 99 % | RESPIRATION RATE: 14 BRPM | TEMPERATURE: 97.4 F

## 2019-03-06 VITALS
RESPIRATION RATE: 20 BRPM | OXYGEN SATURATION: 97 % | DIASTOLIC BLOOD PRESSURE: 64 MMHG | SYSTOLIC BLOOD PRESSURE: 96 MMHG | HEART RATE: 88 BPM | TEMPERATURE: 97.7 F

## 2019-03-06 VITALS
WEIGHT: 203 LBS | OXYGEN SATURATION: 98 % | SYSTOLIC BLOOD PRESSURE: 118 MMHG | HEIGHT: 66 IN | DIASTOLIC BLOOD PRESSURE: 68 MMHG | HEART RATE: 86 BPM | BODY MASS INDEX: 32.62 KG/M2

## 2019-03-06 VITALS
HEART RATE: 58 BPM | DIASTOLIC BLOOD PRESSURE: 70 MMHG | RESPIRATION RATE: 16 BRPM | SYSTOLIC BLOOD PRESSURE: 137 MMHG | OXYGEN SATURATION: 98 %

## 2019-03-06 VITALS — TEMPERATURE: 97.8 F | BODY MASS INDEX: 33.75 KG/M2 | WEIGHT: 210 LBS | HEIGHT: 66 IN

## 2019-03-12 ENCOUNTER — TELEPHONE (OUTPATIENT)
Dept: CARDIOLOGY | Age: 69
End: 2019-03-12

## 2019-04-01 ENCOUNTER — MED REC SCAN ONLY (OUTPATIENT)
Dept: FAMILY MEDICINE CLINIC | Facility: CLINIC | Age: 69
End: 2019-04-01

## 2019-04-01 NOTE — TELEPHONE ENCOUNTER
[facility] Records Received  -ER Report February 2019  -EKG  -ECHO  -Chest X-Ray  Sent to scan

## 2019-04-03 ENCOUNTER — TELEPHONE (OUTPATIENT)
Dept: FAMILY MEDICINE CLINIC | Facility: CLINIC | Age: 69
End: 2019-04-03

## 2019-04-03 DIAGNOSIS — I62.9 INTRACRANIAL HEMORRHAGE (HCC): Primary | ICD-10-CM

## 2019-04-03 NOTE — TELEPHONE ENCOUNTER
[facility] SNF called stating need referral for neurosurgery for pt, Informed SNF of Dr. Diana Burnett and provided contact information , referral pended.

## 2019-04-03 NOTE — TELEPHONE ENCOUNTER
Slime from [facility] is returning nurses call.   Please call her at [phone]

## 2019-04-08 ENCOUNTER — TELEPHONE (OUTPATIENT)
Dept: FAMILY MEDICINE CLINIC | Facility: CLINIC | Age: 69
End: 2019-04-08

## 2019-04-08 DIAGNOSIS — I62.9 INTRACRANIAL HEMORRHAGE (HCC): Primary | ICD-10-CM

## 2019-04-08 NOTE — TELEPHONE ENCOUNTER
Slime with [facility] is requesting additional referrals for pt, for heme/onc and vascular neurology. Pt previously saw heme-onc with Virgil Zhang, Dr. Ashly Hall, do want keep same specialist or prefer one w/in [address]  SNF also requesting vascular neuro

## 2019-04-09 DIAGNOSIS — I10 ESSENTIAL HYPERTENSION, BENIGN: ICD-10-CM

## 2019-04-09 RX ORDER — CARVEDILOL 6.25 MG/1
TABLET ORAL
Qty: 180 TABLET | Refills: 0 | Status: SHIPPED | OUTPATIENT
Start: 2019-04-09 | End: 2019-08-15 | Stop reason: ALTCHOICE

## 2019-04-10 ENCOUNTER — APPOINTMENT (OUTPATIENT)
Dept: CARDIOLOGY | Age: 69
End: 2019-04-10

## 2019-04-10 NOTE — TELEPHONE ENCOUNTER
Will place neurovascular referral but need name of pt treating physician from [facility]. Have not received any updates from [facility]. Please call SNF and pend external referral. Thank you.

## 2019-04-10 NOTE — TELEPHONE ENCOUNTER
Just made aware  pt has [address] [facility]       ____________________________________  [facility]  Spoke with Nanci BREWSTER    Need name of MD for [facility] - neurovascular - Sweetie DOYLE and   Dr. Daniele Morales  (3

## 2019-04-12 NOTE — TELEPHONE ENCOUNTER
Faxed referral for Dr. Stas Graf, Dr. Kitty Marx, and Dr. Ángela Whitaker to Amor Cota at [facility] at [phone]

## 2019-04-15 ENCOUNTER — TELEPHONE (OUTPATIENT)
Dept: SURGERY | Facility: CLINIC | Age: 69
End: 2019-04-15

## 2019-04-15 ENCOUNTER — TELEPHONE (OUTPATIENT)
Dept: HEMATOLOGY/ONCOLOGY | Age: 69
End: 2019-04-15

## 2019-04-15 NOTE — TELEPHONE ENCOUNTER
Pts caregiver Estephania called to cancel cysto procedure scheduled for tomorrow 4/16 due to pt being in the hospital. Does not wish to rs at this time.

## 2019-04-16 NOTE — TELEPHONE ENCOUNTER
Nursing home calling - states pt is not in hosp he is at the home - they do not know who Estephania is or why she cancelled procedure - pls call Rn to reschedule

## 2019-04-17 ENCOUNTER — TELEPHONE (OUTPATIENT)
Dept: FAMILY MEDICINE CLINIC | Facility: CLINIC | Age: 69
End: 2019-04-17

## 2019-04-17 DIAGNOSIS — I25.700 CORONARY ARTERY DISEASE INVOLVING CORONARY BYPASS GRAFT OF NATIVE HEART WITH UNSTABLE ANGINA PECTORIS (HCC): ICD-10-CM

## 2019-04-17 DIAGNOSIS — I61.9 CEREBRAL HEMORRHAGE (HCC): Primary | ICD-10-CM

## 2019-04-17 DIAGNOSIS — I62.9 INTRACRANIAL HEMORRHAGE (HCC): ICD-10-CM

## 2019-04-17 DIAGNOSIS — N40.1 BPH WITH OBSTRUCTION/LOWER URINARY TRACT SYMPTOMS: ICD-10-CM

## 2019-04-17 DIAGNOSIS — N13.8 BPH WITH OBSTRUCTION/LOWER URINARY TRACT SYMPTOMS: ICD-10-CM

## 2019-04-17 NOTE — TELEPHONE ENCOUNTER
Gee Summers called to check on the status of the referral to neurovascular - Dr. Cindy Plascencia. Informed Gee Summers that when we placed the referral pt had Medicare Part B insurance on file.  However, we got notification that referral was not authorized since pt act

## 2019-04-17 NOTE — TELEPHONE ENCOUNTER
Spoke with [facility] LAINE from HIM dept at [facility]. Requested records from patient's most recent hospital stay. [facility] LAINE stated she would fax them over as soon as possible.      Facility / Provider Name: [address] Phone: 29

## 2019-04-18 NOTE — TELEPHONE ENCOUNTER
Attempted to call Fabby Gonzales [facility]) regarding status of referral, VM full. Will attempt to call again at later time.

## 2019-04-18 NOTE — TELEPHONE ENCOUNTER
Faxed referral for Dr. Trang Stauffer for neurosurgery and called Slime at [facility] to inform of new referral and that it was faxed. Aden King verbalized understanding and states will call if has any further questions.

## 2019-04-18 NOTE — TELEPHONE ENCOUNTER
Informed pt of referrals placed and [facility] received them and will setup appts.  Informed pt may take 5/7 days for approval to go through

## 2019-04-18 NOTE — TELEPHONE ENCOUNTER
Pts Daughter Narciso Garay who is on fyi was returning nurses call.  Please call  Back at [phone]

## 2019-04-18 NOTE — TELEPHONE ENCOUNTER
Received list of neurovascular surgeons, 3, from [facility]. Will provide referral for neurovascular surgeon and who is located and of [place] [address]. Yosvany Butler  . please call SNF. Referral placed. Outside neurovascular  physician due to insurance. Thank you.

## 2019-04-19 ENCOUNTER — HOSPITAL ENCOUNTER (EMERGENCY)
Facility: HOSPITAL | Age: 69
Discharge: HOME | End: 2019-04-19
Attending: EMERGENCY MEDICINE
Payer: MEDICARE

## 2019-04-19 ENCOUNTER — APPOINTMENT (OUTPATIENT)
Dept: GENERAL RADIOLOGY | Facility: HOSPITAL | Age: 69
End: 2019-04-19
Attending: EMERGENCY MEDICINE
Payer: MEDICARE

## 2019-04-19 VITALS
TEMPERATURE: 99 F | HEART RATE: 96 BPM | RESPIRATION RATE: 17 BRPM | OXYGEN SATURATION: 98 % | SYSTOLIC BLOOD PRESSURE: 119 MMHG | DIASTOLIC BLOOD PRESSURE: 90 MMHG

## 2019-04-19 DIAGNOSIS — R04.2 HEMOPTYSIS: Primary | ICD-10-CM

## 2019-04-19 PROCEDURE — 87086 URINE CULTURE/COLONY COUNT: CPT | Performed by: EMERGENCY MEDICINE

## 2019-04-19 PROCEDURE — 81001 URINALYSIS AUTO W/SCOPE: CPT | Performed by: EMERGENCY MEDICINE

## 2019-04-19 PROCEDURE — 87186 SC STD MICRODIL/AGAR DIL: CPT | Performed by: EMERGENCY MEDICINE

## 2019-04-19 PROCEDURE — 85025 COMPLETE CBC W/AUTO DIFF WBC: CPT | Performed by: EMERGENCY MEDICINE

## 2019-04-19 PROCEDURE — 99284 EMERGENCY DEPT VISIT MOD MDM: CPT

## 2019-04-19 PROCEDURE — 87088 URINE BACTERIA CULTURE: CPT | Performed by: EMERGENCY MEDICINE

## 2019-04-19 PROCEDURE — 99283 EMERGENCY DEPT VISIT LOW MDM: CPT

## 2019-04-19 PROCEDURE — 36415 COLL VENOUS BLD VENIPUNCTURE: CPT

## 2019-04-19 PROCEDURE — 71045 X-RAY EXAM CHEST 1 VIEW: CPT | Performed by: EMERGENCY MEDICINE

## 2019-04-19 PROCEDURE — 80053 COMPREHEN METABOLIC PANEL: CPT | Performed by: EMERGENCY MEDICINE

## 2019-04-19 PROCEDURE — 85730 THROMBOPLASTIN TIME PARTIAL: CPT | Performed by: EMERGENCY MEDICINE

## 2019-04-19 PROCEDURE — 85610 PROTHROMBIN TIME: CPT | Performed by: EMERGENCY MEDICINE

## 2019-04-19 RX ORDER — AMLODIPINE BESYLATE 10 MG/1
10 TABLET ORAL DAILY
COMMUNITY
End: 2019-08-15 | Stop reason: DRUGHIGH

## 2019-04-19 NOTE — ED INITIAL ASSESSMENT (HPI)
Pt to ED from nursing home per EMS with c/o bloody sputum from trach since yesterday. Pt suctioned once per EMS. Pt is able to answer yes and no occasionally, denied complaints to EMS.

## 2019-04-19 NOTE — ED PROVIDER NOTES
Patient Seen in: [facility] Emergency Department    History   Patient presents with:  Bleeding (hematologic)    Stated Complaint: bloody sputum from trach    HPI    64-year-old male who had hemorrhagic stroke in the past and now is in a nursing home • REPAIR, ENDOVASC, INFRARENAL ABDOM AORTIC ANEURYSM/DISSECT; AORTO-ILIAC/FEMORAL UNI PROSTHESIS  02/22/2017    left leg           Social History    Tobacco Use      Smoking status: Former Smoker        Packs/day: 0.50        Years: 20.00        Pack yea Urine >50 (*)     RBC URINE 6-10 (*)     Bacteria Urine Rare (*)     Mucous Urine 1+ (*)     All other components within normal limits   CBC W/ DIFFERENTIAL - Abnormal; Notable for the following components:    HGB 11.8 (*)     HCT 36.1 (*)     RDW 22.0 (*) days          Medications Prescribed:  Current Discharge Medication List

## 2019-04-19 NOTE — TELEPHONE ENCOUNTER
Spoke with Fariba Montgomery, at [facility] [employer], and rescheduled pt for office cysto for 5-28-19, 2 pm. Cystoscopy instructions faxed ([phone]) to attention of juana Comer. RN for today, with confirmation received.  Fariba Montgomery confirmed all information and state

## 2019-04-19 NOTE — ED NOTES
Pt daughter updated to plan for discharge. She states her main concern is that pt has been complaining of catheter site pain when standing and has been unable to do physical therapy.  She states it has been in place since placed under imaging while pt was a

## 2019-04-20 LAB
A/G RATIO: 0.92 (ref 1.1–2.4)
ALANINE AMINOTRANSFE: 18 U/L (ref 7–52)
ALBUMIN, SERUM (ALB): 3.5 G/DL (ref 3.5–5.7)
ALKALINE PHOSPHATASE (ALK): 94 U/L (ref 34–104)
ANION GAP: 6 MEQ/L (ref 8–16)
ASPARTATE AMINOTRANS: 14 U/L (ref 13–39)
BILIRUBIN, TOTAL: 0.4 MG/DL (ref 0.2–0.8)
BLOOD UREA NITROGEN (BUN): 21 MG/DL (ref 7–25)
BUN/CREATININE RATIO: 36.8 (ref 7.4–23)
CALCIUM, SERUM: 9.6 MG/DL (ref 8.6–10.3)
CARBON DIOXIDE: 29 MMOL/L (ref 21–31)
CHLORIDE, SERUM: 94 MMOL/L (ref 98–107)
CREATININE: 0.57 MG/DL (ref 0.7–1.3)
EST GLOMERULAR FILTRATION RATE: > 60 1.73M SQ
GLUCOSE: 332 MG/DL (ref 70–99)
INTERNATIONAL NORMAL: 1 (ref 0.8–1.1)
K (POTASSIUM, SERUM): 4 MMOL/L (ref 3.5–5.1)
NA (SODIUM, SERUM): 129 MMOL/L (ref 133–144)
PROTEIN TOTAL: 7.3 G/DL (ref 6.4–8.9)
PROTHROMBIN TIME (PATIENT): 11.8 SECONDS (ref 10.1–13.1)
PTT: 31 SECONDS (ref 25–36)

## 2019-04-21 LAB
*MEAN CORPUSCULAR HGB CONC: 33.9 G/DL (ref 32.5–35.8)
BASOPHIL AUTOMATED: 0.6 %
BASOPHILS: 0 (ref 0–0.2)
BEDSIDE GLUCOSE: 282 MG/DL (ref 70–110)
EOSINOPHIL AUTOMATED: 1.8 %
EOSINOPHILS: 0.1 (ref 0–0.5)
HEMATOCRIT: 33.7 % (ref 38.6–49.2)
HEMOGLOBIN: 11.4 GM/DL (ref 13–17)
LYMPHOCYTE AUTOMATED: 27.7 %
LYMPHOCYTES: 1.9 (ref 0.6–3.4)
MEAN CORPUSCULAR HGB: 26.4 PG (ref 26–34)
MEAN CORPUSCULAR VOL: 77.8 FL (ref 80–100)
MEAN PLATELET VOLUME: 8.1 FL (ref 6.8–10.2)
MONOCYTE AUTOMATED: 8.4 %
MONOCYTES: 0.6 (ref 0.3–1)
NEUTROPHIL ABSOLUTE: 4.2 (ref 1.7–7.7)
NEUTROPHIL AUTOMATED: 61.5 %
PLATELET COUNT: 292 K/MM3 (ref 150–450)
RED BLOOD CELL COUNT: 4.33 M/MM3 (ref 4.34–5.6)
RED CELL DISTRIBUTIO: 23.6 % (ref 11.9–15.9)
SLIDE REVIEW COMMENT: ABNORMAL
WHITE BLOOD CELL COU: 6.8 K/MM3 (ref 4–11)

## 2019-04-22 RX ORDER — SULFAMETHOXAZOLE AND TRIMETHOPRIM 800; 160 MG/1; MG/1
1 TABLET ORAL 2 TIMES DAILY
Qty: 14 TABLET | Refills: 0 | Status: SHIPPED | OUTPATIENT
Start: 2019-04-22 | End: 2019-04-29

## 2019-05-02 ENCOUNTER — APPOINTMENT (OUTPATIENT)
Dept: HEMATOLOGY/ONCOLOGY | Facility: HOSPITAL | Age: 69
End: 2019-05-02
Attending: INTERNAL MEDICINE
Payer: MEDICARE

## 2019-05-10 ENCOUNTER — TELEPHONE (OUTPATIENT)
Dept: INTERNAL MEDICINE CLINIC | Facility: CLINIC | Age: 69
End: 2019-05-10

## 2019-05-10 NOTE — TELEPHONE ENCOUNTER
Pt has appt with [facility] 5-13-19, Pt in hospital   Has been in the hospital for 3 months, will call back. No need to call back now.

## 2019-06-06 ENCOUNTER — TELEPHONE (OUTPATIENT)
Dept: FAMILY MEDICINE CLINIC | Facility: CLINIC | Age: 69
End: 2019-06-06

## 2019-06-06 DIAGNOSIS — N41.9 PROSTATITIS, UNSPECIFIED PROSTATITIS TYPE: ICD-10-CM

## 2019-06-06 DIAGNOSIS — N36.5 FALSE PASSAGE OF URETHRA: Primary | ICD-10-CM

## 2019-06-06 DIAGNOSIS — N41.1 CHRONIC PROSTATITIS: ICD-10-CM

## 2019-06-06 NOTE — TELEPHONE ENCOUNTER
Sarah Smith from [address] in pt acute rehab. Is calling to see if the pt can get a referral to a urologist he has a hicks that needs to be taken care of. She talked to the family and they said that Dr. Klever Stark is his primary doctor.   If do

## 2019-06-07 NOTE — TELEPHONE ENCOUNTER
Spoke with Jenn Moreira @ [facility], provided referral information to Dr. Marilynn Pierce office, she will call their office today to schedule an appointment. Jenn Moreira indicates patient will be going into a nursing facility for now.  Jenn Moreira verbalized M[place]

## 2019-06-07 NOTE — TELEPHONE ENCOUNTER
Please call rehab and find out diagnosis for insertion of Cook catheter. Patient has history of cerebral hemorrhage and was hospitalized and has been out of network at [facility] in [facility] then transferred to rehab.   Please pend referral for Dr. Twilla Bence

## 2019-06-07 NOTE — TELEPHONE ENCOUNTER
Please schedule a appointment with     UROLOGY  Dr. Jorge Carey MD   [place] [id number]   telephone number: [phone]    Please call rehab facility and provide referral information and have them call today for ap

## 2019-06-07 NOTE — TELEPHONE ENCOUNTER
Spoke with Anastacia at [facility] indicates patient needs urology referral for \"false passage\" and \"prostatitis\". Referral pended to Dr. Aditya Green for your review and approval if ok. Please advise.

## 2019-06-13 ENCOUNTER — OFFICE VISIT (OUTPATIENT)
Dept: SURGERY | Facility: CLINIC | Age: 69
End: 2019-06-13
Payer: COMMERCIAL

## 2019-06-13 VITALS
DIASTOLIC BLOOD PRESSURE: 70 MMHG | TEMPERATURE: 98 F | WEIGHT: 205 LBS | HEART RATE: 110 BPM | SYSTOLIC BLOOD PRESSURE: 138 MMHG | BODY MASS INDEX: 33 KG/M2

## 2019-06-13 DIAGNOSIS — N41.9 PROSTATITIS, UNSPECIFIED PROSTATITIS TYPE: ICD-10-CM

## 2019-06-13 DIAGNOSIS — Z97.8 FOLEY CATHETER IN PLACE: ICD-10-CM

## 2019-06-13 DIAGNOSIS — Z87.448 HISTORY OF URETHRAL STRICTURE: ICD-10-CM

## 2019-06-13 DIAGNOSIS — R33.9 URINARY RETENTION: Primary | ICD-10-CM

## 2019-06-13 PROCEDURE — 99212 OFFICE O/P EST SF 10 MIN: CPT | Performed by: NURSE PRACTITIONER

## 2019-06-13 PROCEDURE — 99213 OFFICE O/P EST LOW 20 MIN: CPT | Performed by: NURSE PRACTITIONER

## 2019-06-13 NOTE — PROGRESS NOTES
HPI:    Patient ID: Tammi Hays is a 77year old male. HPI     Patient is a 77year old male who presents to the clinic for a follow up. Last seen by Dr. Ismael Senior 2/15/19 with complaints of urinary frequency.   He has a significant past urological hist hematuria. Musculoskeletal: Positive for gait problem. Neurological: Negative for dizziness and light-headedness. Current Outpatient Medications:  amLODIPine Besylate 10 MG Oral Tab 10 mg by Per G Tube route daily.  Disp:  Rfl:    TraZODone OSITO Disp: 30 tablet Rfl: 11   gabapentin (NEURONTIN) 300 MG Oral Cap Take 300 mg by mouth 3 (three) times daily.  Disp:  Rfl:    CARVEDILOL 6.25 MG Oral Tab TAKE 1 TABLET(6.25 MG) BY MOUTH TWICE DAILY WITH MEALS Disp: 180 tablet Rfl: 0   Sertraline HCl 100 MG O following IV             contrast for abdominal CT 4/2017.     HISTORY:  Past Medical History:   Diagnosis Date   • AAA (abdominal aortic aneurysm) without rupture (HCC)    • Abnormal posture    • Acute pancreatitis 1/26/2015   • Acute respiratory failure w Quit date: 12        Years since quittin.4      Smokeless tobacco: Never Used      Tobacco comment: quit     Alcohol use: No    Drug use: No       PHYSICAL EXAM:    Physical Exam   Constitutional: No distress. HENT:   Head: Normocephalic.    Abel Kirby

## 2019-06-14 ENCOUNTER — TELEPHONE (OUTPATIENT)
Dept: SURGERY | Facility: CLINIC | Age: 69
End: 2019-06-14

## 2019-06-14 NOTE — TELEPHONE ENCOUNTER
RN from [address] [place] [address] calling about pts appt on 06/13/19. States they did not receive any follow up care instructions for pt and asking if after visit instructions be faxed to [phone].

## 2019-06-14 NOTE — TELEPHONE ENCOUNTER
I faxed LYNDSAY's visit notes from yesterdays appt to [facility] home and received a fax confirmation.

## 2019-06-18 ENCOUNTER — OFFICE VISIT (OUTPATIENT)
Dept: SURGERY | Facility: CLINIC | Age: 69
End: 2019-06-18
Payer: COMMERCIAL

## 2019-06-18 VITALS
WEIGHT: 170 LBS | BODY MASS INDEX: 27 KG/M2 | HEART RATE: 114 BPM | SYSTOLIC BLOOD PRESSURE: 128 MMHG | DIASTOLIC BLOOD PRESSURE: 74 MMHG

## 2019-06-18 DIAGNOSIS — R33.9 URINARY RETENTION: ICD-10-CM

## 2019-06-18 DIAGNOSIS — R39.15 URINARY URGENCY: Primary | ICD-10-CM

## 2019-06-18 PROCEDURE — 99212 OFFICE O/P EST SF 10 MIN: CPT | Performed by: UROLOGY

## 2019-06-18 PROCEDURE — 51702 INSERT TEMP BLADDER CATH: CPT | Performed by: UROLOGY

## 2019-06-18 PROCEDURE — 99214 OFFICE O/P EST MOD 30 MIN: CPT | Performed by: UROLOGY

## 2019-06-18 NOTE — PROGRESS NOTES
Patient's name and  verified, physician's order verified. Patient's urine in hicks bag noted to be bright orange. Patient's daughter states patient taking pyridium.   Per Dr. Jose Gil verbal order, place a 16 Fr straight tip hicks since that is what gary

## 2019-06-18 NOTE — PROGRESS NOTES
Homero Lomas is a 77year old male. HPI:   Patient presents with: Follow - Up: patient presents to discuss treatment options spt vs dilation per [facility]      78-year-old male accompanied by his daughter in follow-up to visit February 15, 2019.   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   • Iliac artery aneurysm, left ([address] 8/25/2016    [place] repair at [facility] Jane Wills/Raj 2/22/2017   • Nontraumatic intracerebral hemorrhage in hemisphere, noel Does not apply Kit USE TO TEST BS TID Disp:  Rfl: 0   BRILINTA 60 MG Oral Tab  Disp:  Rfl:    Insulin Lispro (HUMALOG KWIKPEN) 200 UNIT/ML Subcutaneous Solution Pen-injector Inject 12- 25 units daily as directed before meals based on BG reading Disp: 324 m U/F) 32G X 4 MM Does not apply Misc 4 injections daily Disp: 200 Box Rfl: 1   Glucose Blood (ACCU-CHEK GUIDE) In Vitro Strip Test 3 x daily Disp: 100 strip Rfl: 11   ACCU-CHEK FASTCLIX LANCETS Does not apply Misc 3 x daily Disp: 1 Box Rfl: 11   EPINEPHrine tamsulosin, may discontinue oxybutynin catheter at this time. Orders This Visit:  No orders of the defined types were placed in this encounter.       Meds This Visit:  Requested Prescriptions      No prescriptions requested or ordered in this encoun

## 2019-06-26 ENCOUNTER — PATIENT OUTREACH (OUTPATIENT)
Dept: FAMILY MEDICINE CLINIC | Facility: CLINIC | Age: 69
End: 2019-06-26

## 2019-06-26 NOTE — PROGRESS NOTES
Spoke with patients daughter Marie Heller per [facility] and she states patient is in the Hospital and will be moved to a rehab facility. Patient has difficult walking and they can not get him here because of transportation issues.

## 2019-06-28 ENCOUNTER — MED REC SCAN ONLY (OUTPATIENT)
Dept: FAMILY MEDICINE CLINIC | Facility: CLINIC | Age: 69
End: 2019-06-28

## 2019-08-05 ENCOUNTER — TELEPHONE (OUTPATIENT)
Dept: FAMILY MEDICINE CLINIC | Facility: CLINIC | Age: 69
End: 2019-08-05

## 2019-08-07 ENCOUNTER — TELEPHONE (OUTPATIENT)
Dept: FAMILY MEDICINE CLINIC | Facility: CLINIC | Age: 69
End: 2019-08-07

## 2019-08-07 DIAGNOSIS — I62.9 INTRACRANIAL HEMORRHAGE (HCC): ICD-10-CM

## 2019-08-07 DIAGNOSIS — Z95.5 S/P CORONARY ARTERY STENT PLACEMENT: ICD-10-CM

## 2019-08-07 DIAGNOSIS — I61.9 CEREBRAL HEMORRHAGE (HCC): ICD-10-CM

## 2019-08-07 DIAGNOSIS — R06.02 SOB (SHORTNESS OF BREATH): Primary | ICD-10-CM

## 2019-08-07 NOTE — TELEPHONE ENCOUNTER
Tommie Holloway called with updated for Dr. Nancy Alan. Patient vitals are in normal range,started home health today will start physical therapy tomorrow. Need a prescription for a nebulizer machine sent to his pharmacy.

## 2019-08-08 NOTE — TELEPHONE ENCOUNTER
Spoke with Rosmery Galindo from [facility] and he states patient was given DuoNeb treatments at the rehab center PRN SOB s/p cerebral hemorrhage.    Rosmery Galindo reposrts daughter was given the ipratropium vials from rehab but was told a neb machine was needed from

## 2019-08-09 ENCOUNTER — TELEPHONE (OUTPATIENT)
Dept: FAMILY MEDICINE CLINIC | Facility: CLINIC | Age: 69
End: 2019-08-09

## 2019-08-10 ENCOUNTER — EXTERNAL RECORD (OUTPATIENT)
Dept: HEALTH INFORMATION MANAGEMENT | Facility: OTHER | Age: 69
End: 2019-08-10

## 2019-08-10 ENCOUNTER — OFF PREMISE (OUTPATIENT)
Dept: HEALTH INFORMATION MANAGEMENT | Facility: OTHER | Age: 69
End: 2019-08-10

## 2019-08-10 PROCEDURE — 51702 INSERT TEMP BLADDER CATH: CPT | Performed by: UROLOGY

## 2019-08-10 PROCEDURE — 99282 EMERGENCY DEPT VISIT SF MDM: CPT | Performed by: UROLOGY

## 2019-08-12 ENCOUNTER — TELEPHONE (OUTPATIENT)
Dept: FAMILY MEDICINE CLINIC | Facility: CLINIC | Age: 69
End: 2019-08-12

## 2019-08-12 NOTE — TELEPHONE ENCOUNTER
Miller Vasquez with [address] was called to provider follow-up on call received.  Patient was recently discharge from [facility] Dr. Ju Ferro the attending from [facility] wrote for patient to have PT, OT, SP, Nursing, So

## 2019-08-12 NOTE — TELEPHONE ENCOUNTER
Attempted to relay the below written message, was unsuccessful. due to call denied. Message per Dr. Malena De Jesus     Received fax from [facility] urine cultures positive for multiple organisms with sensitivities on 8/6.   Please verify patient is taking

## 2019-08-12 NOTE — TELEPHONE ENCOUNTER
Nnamdi Hedrick with [address] is calling for an authorization be sent to [facility] care.   Please call him at [phone]

## 2019-08-15 ENCOUNTER — OFFICE VISIT (OUTPATIENT)
Dept: FAMILY MEDICINE CLINIC | Facility: CLINIC | Age: 69
End: 2019-08-15
Payer: MEDICARE

## 2019-08-15 VITALS
DIASTOLIC BLOOD PRESSURE: 64 MMHG | SYSTOLIC BLOOD PRESSURE: 104 MMHG | OXYGEN SATURATION: 97 % | TEMPERATURE: 98 F | HEART RATE: 112 BPM

## 2019-08-15 DIAGNOSIS — E11.42 TYPE 2 DIABETES MELLITUS WITH DIABETIC POLYNEUROPATHY, WITH LONG-TERM CURRENT USE OF INSULIN (HCC): ICD-10-CM

## 2019-08-15 DIAGNOSIS — K55.20 AVM (ARTERIOVENOUS MALFORMATION) OF COLON WITHOUT HEMORRHAGE: ICD-10-CM

## 2019-08-15 DIAGNOSIS — N13.8 BPH WITH OBSTRUCTION/LOWER URINARY TRACT SYMPTOMS: ICD-10-CM

## 2019-08-15 DIAGNOSIS — Z95.5 S/P CORONARY ARTERY STENT PLACEMENT: ICD-10-CM

## 2019-08-15 DIAGNOSIS — D50.0 IRON DEFICIENCY ANEMIA DUE TO CHRONIC BLOOD LOSS: ICD-10-CM

## 2019-08-15 DIAGNOSIS — I73.9 PERIPHERAL VASCULAR DISEASE (HCC): ICD-10-CM

## 2019-08-15 DIAGNOSIS — I25.10 CAD, MULTIPLE VESSEL: ICD-10-CM

## 2019-08-15 DIAGNOSIS — R29.6 RECURRENT FALLS: ICD-10-CM

## 2019-08-15 DIAGNOSIS — M79.605 CHRONIC PAIN OF LOWER EXTREMITY, BILATERAL: ICD-10-CM

## 2019-08-15 DIAGNOSIS — I69.354 HEMIPARESIS AFFECTING LEFT SIDE AS LATE EFFECT OF STROKE (HCC): ICD-10-CM

## 2019-08-15 DIAGNOSIS — Z23 NEED FOR HEPATITIS B VACCINATION: ICD-10-CM

## 2019-08-15 DIAGNOSIS — J45.20 MILD INTERMITTENT ASTHMA WITHOUT COMPLICATION: ICD-10-CM

## 2019-08-15 DIAGNOSIS — N40.1 BPH WITH OBSTRUCTION/LOWER URINARY TRACT SYMPTOMS: ICD-10-CM

## 2019-08-15 DIAGNOSIS — T83.511A URINARY TRACT INFECTION ASSOCIATED WITH INDWELLING URETHRAL CATHETER, INITIAL ENCOUNTER (HCC): ICD-10-CM

## 2019-08-15 DIAGNOSIS — G89.29 CHRONIC PAIN OF LOWER EXTREMITY, BILATERAL: ICD-10-CM

## 2019-08-15 DIAGNOSIS — I25.5 CARDIOMYOPATHY, ISCHEMIC: ICD-10-CM

## 2019-08-15 DIAGNOSIS — S43.005A DISLOCATION OF LEFT SHOULDER JOINT, INITIAL ENCOUNTER: ICD-10-CM

## 2019-08-15 DIAGNOSIS — M25.512 ACUTE PAIN OF LEFT SHOULDER: ICD-10-CM

## 2019-08-15 DIAGNOSIS — N42.31 HIGH GRADE PROSTATIC INTRAEPITHELIAL NEOPLASIA: ICD-10-CM

## 2019-08-15 DIAGNOSIS — N41.1 CHRONIC PROSTATITIS: ICD-10-CM

## 2019-08-15 DIAGNOSIS — Z00.00 ENCOUNTER FOR ANNUAL HEALTH EXAMINATION: Primary | ICD-10-CM

## 2019-08-15 DIAGNOSIS — R53.83 OTHER FATIGUE: ICD-10-CM

## 2019-08-15 DIAGNOSIS — K80.20 CALCULUS OF GALLBLADDER WITHOUT CHOLECYSTITIS WITHOUT OBSTRUCTION: ICD-10-CM

## 2019-08-15 DIAGNOSIS — N39.0 URINARY TRACT INFECTION ASSOCIATED WITH INDWELLING URETHRAL CATHETER, INITIAL ENCOUNTER (HCC): ICD-10-CM

## 2019-08-15 DIAGNOSIS — G57.93 NEUROPATHY INVOLVING BOTH LOWER EXTREMITIES: ICD-10-CM

## 2019-08-15 DIAGNOSIS — F33.1 MODERATE EPISODE OF RECURRENT MAJOR DEPRESSIVE DISORDER (HCC): ICD-10-CM

## 2019-08-15 DIAGNOSIS — I61.0 NONTRAUMATIC SUBCORTICAL HEMORRHAGE OF RIGHT CEREBRAL HEMISPHERE (HCC): ICD-10-CM

## 2019-08-15 DIAGNOSIS — G81.94 ACUTE LEFT HEMIPARESIS (HCC): ICD-10-CM

## 2019-08-15 DIAGNOSIS — I71.4 ABDOMINAL AORTIC ANEURYSM (AAA) WITHOUT RUPTURE (HCC): ICD-10-CM

## 2019-08-15 DIAGNOSIS — E53.8 B12 NUTRITIONAL DEFICIENCY: ICD-10-CM

## 2019-08-15 DIAGNOSIS — M79.604 CHRONIC PAIN OF LOWER EXTREMITY, BILATERAL: ICD-10-CM

## 2019-08-15 DIAGNOSIS — I62.9 INTRACRANIAL HEMORRHAGE (HCC): ICD-10-CM

## 2019-08-15 DIAGNOSIS — Z79.4 TYPE 2 DIABETES MELLITUS WITH DIABETIC POLYNEUROPATHY, WITH LONG-TERM CURRENT USE OF INSULIN (HCC): ICD-10-CM

## 2019-08-15 DIAGNOSIS — E78.2 MIXED HYPERLIPIDEMIA: ICD-10-CM

## 2019-08-15 DIAGNOSIS — I10 ESSENTIAL HYPERTENSION, BENIGN: ICD-10-CM

## 2019-08-15 DIAGNOSIS — I77.9 PERIPHERAL ARTERIAL OCCLUSIVE DISEASE (HCC): ICD-10-CM

## 2019-08-15 DIAGNOSIS — K21.9 GASTROESOPHAGEAL REFLUX DISEASE, ESOPHAGITIS PRESENCE NOT SPECIFIED: ICD-10-CM

## 2019-08-15 DIAGNOSIS — G44.89 OTHER HEADACHE SYNDROME: ICD-10-CM

## 2019-08-15 DIAGNOSIS — F51.04 PSYCHOPHYSIOLOGICAL INSOMNIA: ICD-10-CM

## 2019-08-15 PROBLEM — F32.9 MAJOR DEPRESSIVE DISORDER: Status: RESOLVED | Noted: 2019-06-25 | Resolved: 2019-08-15

## 2019-08-15 PROBLEM — Z83.49 FAMILY HISTORY OF B12 DEFICIENCY: Status: ACTIVE | Noted: 2019-08-15

## 2019-08-15 PROBLEM — R47.9 TRANSIENT SPEECH DISTURBANCE: Status: ACTIVE | Noted: 2019-06-24

## 2019-08-15 PROBLEM — J96.00 ACUTE RESPIRATORY FAILURE (HCC): Status: ACTIVE | Noted: 2019-03-02

## 2019-08-15 PROBLEM — F41.1 GENERALIZED ANXIETY DISORDER: Status: RESOLVED | Noted: 2017-03-24 | Resolved: 2019-08-15

## 2019-08-15 PROBLEM — D69.59 PLATELET DYSFUNCTION DUE TO DRUGS: Status: RESOLVED | Noted: 2019-03-02 | Resolved: 2019-08-15

## 2019-08-15 PROBLEM — R47.9 TRANSIENT SPEECH DISTURBANCE: Status: RESOLVED | Noted: 2019-06-24 | Resolved: 2019-08-15

## 2019-08-15 PROBLEM — T50.905A PLATELET DYSFUNCTION DUE TO DRUGS: Status: RESOLVED | Noted: 2019-03-02 | Resolved: 2019-08-15

## 2019-08-15 PROBLEM — Z91.81 HISTORY OF FALL: Status: ACTIVE | Noted: 2019-08-15

## 2019-08-15 PROBLEM — T50.905A PLATELET DYSFUNCTION DUE TO DRUGS: Status: ACTIVE | Noted: 2019-03-02

## 2019-08-15 PROBLEM — D69.59 PLATELET DYSFUNCTION DUE TO DRUGS: Status: ACTIVE | Noted: 2019-03-02

## 2019-08-15 PROBLEM — F32.9 MAJOR DEPRESSIVE DISORDER: Status: ACTIVE | Noted: 2019-06-25

## 2019-08-15 PROBLEM — G93.6 CYTOTOXIC CEREBRAL EDEMA (HCC): Status: RESOLVED | Noted: 2019-03-02 | Resolved: 2019-08-15

## 2019-08-15 PROBLEM — L30.4 INTERTRIGO: Status: RESOLVED | Noted: 2019-06-26 | Resolved: 2019-08-15

## 2019-08-15 PROBLEM — R07.89 ATYPICAL CHEST PAIN: Status: RESOLVED | Noted: 2019-06-25 | Resolved: 2019-08-15

## 2019-08-15 PROBLEM — J96.00 ACUTE RESPIRATORY FAILURE (HCC): Status: RESOLVED | Noted: 2019-03-02 | Resolved: 2019-08-15

## 2019-08-15 PROBLEM — Z87.891 FORMER TOBACCO USE: Status: RESOLVED | Noted: 2019-08-15 | Resolved: 2019-08-15

## 2019-08-15 PROBLEM — G93.6 CYTOTOXIC CEREBRAL EDEMA (HCC): Status: ACTIVE | Noted: 2019-03-02

## 2019-08-15 PROBLEM — Z87.891 FORMER TOBACCO USE: Status: ACTIVE | Noted: 2019-08-15

## 2019-08-15 PROBLEM — Z83.49 FAMILY HISTORY OF B12 DEFICIENCY: Status: RESOLVED | Noted: 2019-08-15 | Resolved: 2019-08-15

## 2019-08-15 PROBLEM — R07.89 ATYPICAL CHEST PAIN: Status: ACTIVE | Noted: 2019-06-25

## 2019-08-15 PROBLEM — R51.9 ACUTE NONINTRACTABLE HEADACHE: Status: ACTIVE | Noted: 2019-06-25

## 2019-08-15 PROBLEM — L30.4 INTERTRIGO: Status: ACTIVE | Noted: 2019-06-26

## 2019-08-15 PROCEDURE — G0439 PPPS, SUBSEQ VISIT: HCPCS | Performed by: FAMILY MEDICINE

## 2019-08-15 PROCEDURE — 99397 PER PM REEVAL EST PAT 65+ YR: CPT | Performed by: FAMILY MEDICINE

## 2019-08-15 PROCEDURE — 96160 PT-FOCUSED HLTH RISK ASSMT: CPT | Performed by: FAMILY MEDICINE

## 2019-08-15 RX ORDER — FAMOTIDINE 20 MG/1
20 TABLET ORAL 2 TIMES DAILY
Qty: 180 TABLET | Refills: 2 | Status: ON HOLD | OUTPATIENT
Start: 2019-08-15 | End: 2019-09-15

## 2019-08-15 RX ORDER — FERROUS SULFATE 325(65) MG
325 TABLET ORAL
COMMUNITY
End: 2019-08-15

## 2019-08-15 RX ORDER — MELATONIN
1000 DAILY
COMMUNITY
End: 2019-08-15

## 2019-08-15 RX ORDER — NITROFURANTOIN 25; 75 MG/1; MG/1
100 CAPSULE ORAL 2 TIMES DAILY
Refills: 0 | COMMUNITY
Start: 2019-08-11 | End: 2019-10-21 | Stop reason: ALTCHOICE

## 2019-08-15 RX ORDER — LIDOCAINE 4 G/G
2 PATCH TOPICAL DAILY PRN
Qty: 30 PATCH | Refills: 3 | Status: ON HOLD | OUTPATIENT
Start: 2019-08-15 | End: 2020-09-14

## 2019-08-15 RX ORDER — BUTALBITAL/ASPIRIN/CAFFEINE 50-325-40
1 CAPSULE ORAL EVERY 4 HOURS PRN
Qty: 30 CAPSULE | Refills: 0 | Status: SHIPPED | OUTPATIENT
Start: 2019-08-15 | End: 2019-09-10 | Stop reason: ALTCHOICE

## 2019-08-15 RX ORDER — TRAZODONE HYDROCHLORIDE 100 MG/1
100 TABLET ORAL NIGHTLY
Qty: 90 TABLET | Refills: 1 | Status: SHIPPED | OUTPATIENT
Start: 2019-08-15 | End: 2020-01-06

## 2019-08-15 RX ORDER — METFORMIN HYDROCHLORIDE 750 MG/1
750 TABLET, EXTENDED RELEASE ORAL 2 TIMES DAILY WITH MEALS
Qty: 180 TABLET | Refills: 1 | Status: SHIPPED | OUTPATIENT
Start: 2019-08-15 | End: 2020-02-24

## 2019-08-15 RX ORDER — RAMELTEON 8 MG/1
8 TABLET ORAL NIGHTLY PRN
COMMUNITY
Start: 2019-05-08 | End: 2019-08-15

## 2019-08-15 RX ORDER — ASPIRIN 81 MG/1
81 TABLET ORAL DAILY
Qty: 90 TABLET | Refills: 2 | Status: SHIPPED | OUTPATIENT
Start: 2019-08-15

## 2019-08-15 RX ORDER — FAMOTIDINE 20 MG/1
20 TABLET ORAL 2 TIMES DAILY
COMMUNITY
Start: 2019-07-26 | End: 2019-08-15

## 2019-08-15 RX ORDER — MAGNESIUM OXIDE 400 MG (241.3 MG MAGNESIUM) TABLET
1 TABLET NIGHTLY PRN
Qty: 90 TABLET | Refills: 0 | Status: SHIPPED | OUTPATIENT
Start: 2019-08-15 | End: 2019-10-15

## 2019-08-15 RX ORDER — TAMSULOSIN HYDROCHLORIDE 0.4 MG/1
0.4 CAPSULE ORAL DAILY
Qty: 90 CAPSULE | Refills: 1 | Status: SHIPPED | OUTPATIENT
Start: 2019-08-15 | End: 2020-02-24

## 2019-08-15 RX ORDER — IPRATROPIUM BROMIDE AND ALBUTEROL SULFATE 2.5; .5 MG/3ML; MG/3ML
3 SOLUTION RESPIRATORY (INHALATION) 4 TIMES DAILY PRN
COMMUNITY
End: 2019-08-15

## 2019-08-15 RX ORDER — TRAMADOL HYDROCHLORIDE 50 MG/1
25 TABLET ORAL EVERY 6 HOURS PRN
Qty: 28 TABLET | Refills: 1 | Status: SHIPPED | OUTPATIENT
Start: 2019-08-15 | End: 2019-09-10

## 2019-08-15 RX ORDER — AMLODIPINE BESYLATE 5 MG/1
5 TABLET ORAL DAILY
Qty: 90 TABLET | Refills: 1 | Status: SHIPPED | OUTPATIENT
Start: 2019-08-15 | End: 2020-02-19

## 2019-08-15 RX ORDER — LIDOCAINE 4 G/G
2 PATCH TOPICAL 2 TIMES DAILY PRN
COMMUNITY
End: 2019-08-15

## 2019-08-15 RX ORDER — FERROUS SULFATE 325(65) MG
325 TABLET ORAL
Qty: 90 TABLET | Refills: 3 | Status: SHIPPED | OUTPATIENT
Start: 2019-08-15 | End: 2020-09-21

## 2019-08-15 RX ORDER — ONDANSETRON 4 MG/1
4 TABLET, FILM COATED ORAL EVERY 8 HOURS PRN
COMMUNITY
End: 2019-08-15

## 2019-08-15 RX ORDER — POLYMYXIN B SULFATE AND TRIMETHOPRIM 1; 10000 MG/ML; [USP'U]/ML
1 SOLUTION OPHTHALMIC EVERY 4 HOURS
COMMUNITY
Start: 2019-08-09 | End: 2019-08-16

## 2019-08-15 RX ORDER — ATORVASTATIN CALCIUM 20 MG/1
20 TABLET, FILM COATED ORAL EVERY EVENING
COMMUNITY
Start: 2016-07-30 | End: 2019-08-15

## 2019-08-15 RX ORDER — CLOPIDOGREL BISULFATE 75 MG/1
75 TABLET ORAL DAILY
COMMUNITY
Start: 2016-07-19 | End: 2019-08-15

## 2019-08-15 RX ORDER — ASPIRIN 81 MG
100 TABLET, DELAYED RELEASE (ENTERIC COATED) ORAL 2 TIMES DAILY PRN
Qty: 60 TABLET | Refills: 5 | Status: SHIPPED | OUTPATIENT
Start: 2019-08-15 | End: 2019-10-21

## 2019-08-15 RX ORDER — ATORVASTATIN CALCIUM 20 MG/1
20 TABLET, FILM COATED ORAL NIGHTLY
Qty: 90 TABLET | Refills: 1 | Status: SHIPPED | OUTPATIENT
Start: 2019-08-15 | End: 2020-04-13

## 2019-08-15 RX ORDER — IPRATROPIUM BROMIDE AND ALBUTEROL SULFATE 2.5; .5 MG/3ML; MG/3ML
3 SOLUTION RESPIRATORY (INHALATION) 4 TIMES DAILY PRN
Qty: 60 ML | Refills: 1 | Status: SHIPPED | OUTPATIENT
Start: 2019-08-15

## 2019-08-15 RX ORDER — GABAPENTIN 300 MG/1
600 CAPSULE ORAL NIGHTLY
Qty: 90 CAPSULE | Refills: 1 | Status: SHIPPED | OUTPATIENT
Start: 2019-08-15 | End: 2019-09-10

## 2019-08-15 RX ORDER — BACLOFEN 10 MG/1
10 TABLET ORAL 2 TIMES DAILY
Qty: 180 TABLET | Refills: 1 | Status: SHIPPED | OUTPATIENT
Start: 2019-08-15 | End: 2020-01-06

## 2019-08-15 RX ORDER — SERTRALINE HYDROCHLORIDE 100 MG/1
200 TABLET, FILM COATED ORAL DAILY
Qty: 180 TABLET | Refills: 1 | Status: SHIPPED | OUTPATIENT
Start: 2019-08-15 | End: 2020-06-15

## 2019-08-15 RX ORDER — CLOPIDOGREL BISULFATE 75 MG/1
75 TABLET ORAL DAILY
Qty: 90 TABLET | Refills: 1 | Status: SHIPPED | OUTPATIENT
Start: 2019-08-15 | End: 2020-02-19

## 2019-08-15 RX ORDER — ONDANSETRON 4 MG/1
4 TABLET, FILM COATED ORAL EVERY 8 HOURS PRN
Qty: 30 TABLET | Refills: 2 | Status: SHIPPED | OUTPATIENT
Start: 2019-08-15

## 2019-08-15 NOTE — PATIENT INSTRUCTIONS
As of October 6th 2014, the Drug Enforcement Agency [facility]) is reclassifying all hydrocodone combination medications from Schedule III to Schedule II. This includes medications such as Norco, Vicodin, Lortab, Zohydro, and Vicoprofen.      What this means for may not be covered, or covered at this frequency, by your insurer. Please check with your insurance carrier before scheduling to verify coverage.     PREVENTATIVE SERVICES  INDICATIONS AND SCHEDULE Internal Lab or Procedure External Lab or Procedure   Diabe if applicable    Flex Sigmoidoscopy Screen  Covered every 5 years No results found for this or any previous visit. No flowsheet data found. Fecal Occult Blood   Covered Annually No results found for: FOB, OCCULTSTOOL No flowsheet data found.      Barium needed    Zoster (Not covered by Medicare Part B) No orders found for this or any previous visit. This may be covered with your pharmacy  prescription benefits     Recommended Websites for Advanced Directives    [email or web address]/p or floaters. Symptoms may come and go. Early treatment and good control of risk factors may help prevent vision loss or blindness.   What you can do  Have your eyes examined regularly by an eye specialist. Your healthcare provider will tell you when and how items.  · Any problems with your vision. This includes blurred or double vision, problems seeing at night, or flashes or floaters. Your eye exam  Your eye healthcare provider uses an eye chart and other tools to check your vision.  Then he or she checks yo safer. They could even save your life. Take a careful look around your home. Change what you can on your own. Hire someone or ask friends or family to help with harder tasks. Be sure to add a nonslip mat to the inside of your shower or bathtub.  Always few changes at once. This will give you time to adjust to them.     Outside your home  You might arrange for these changes yourself, or you might need to talk to your  or homeowners' association about them.   · Have loose boards on porches o standing? · Do you have a hard time getting in and out of the bathtub or on and off the toilet? · Do you lean on objects to help you get around? Or do you use a cane or walker? · Do you have vision or hearing problems?  For example, do you need new glass substitute for professional medical care. Always follow your healthcare professional's instructions. Preventing Falls in the Home  An adult or child can fall for many reasons.  If you are an older adult, you may fall because your reaction time slows backing. Lighting and the environment  Improve lighting in your home by doing the following:   · Keep a flashlight in each room. Or put a lamp next to the bed within easy reach. · Put nightlights in the bedrooms, hallways, kitchen, and bathrooms.   · Make

## 2019-08-15 NOTE — PROGRESS NOTES
HPI:   Luz Marina Sims is a 77year old male who presents for a MA (Medicare Advantage) [facility] (Once per calendar year)       History of intracranial hemorrhagic stroke on 3/1/2019 presented to [facility] with strokelike symptoms and not moving h chair with the right arm. Headaches-since stroke he will have periodic pain in the top of his head and will occasionally use generic Fioricet with relief.   Daughter states he has not complained of headache in over a week she is only given a 1 pill since medical and recently Dr. Candance Rocher. Patient needs catheter change q. 2 weeks. He was recently taken to [facility] and treated in the ER due to leaking catheter, dark smelling urine and complaining of pain at the catheter site.   Daughter states his prostate nitroglycerin in several months. He is off beta-blocker, off Brilinta due to intracranial hemorrhage. Last stent was spring 2018. Off isosorbide.   History of procedures and treatment per cardiologist note Dr. Jewel Kocher  Coronary artery disease/Coronary Art wounds/ulcers  - continue walking  - Peripheral angiogram after urological intervention completed  3.  Ischemic cardiomyopathy NYHA 2  - EF 25-30%; awaiting new ECHO June 2017  - 7/11/2017 EHCO EF 40%  - 7/19/2017 MUGA EF 39%      History of chronic lower e Physical due on 2019        Fall/Risk Assessment abnormal    He has been screened for Falls and is High Risk: Fall/Risk Scorin    Increasing activity, increasing vitamin D, and/or physical therapy are recommended by the USPSTF and we discussed op difficulties Taking Meds as Rx'd based on screening of functional status. Taking medications as prescribed: Need some help      He has Hearing problems based on screening of functional status.    Hearing Problems?: Yes  He has Vision problems based on scr General (Family Medicine)  TRICIA Whipple (Nurse Practitioner)    Patient Active Problem List:     Unstable angina [facility])     DM type 2, uncontrolled, with neuropathy ([facility]     AAA (abdominal aortic aneurysm) ([facility]     Generalized anxiety disorder encounter (Office Visit) with Janet Calle DO:  Hepatitis B Vac Recombinant 20 MCG/ML Injection Suspension Inject 1 mL (20 mcg total) into the muscle once for 1 dose. Clopidogrel Bisulfate 75 MG Oral Tab Take 75 mg by mouth daily.    Polymyxin B-Trimet brother and sister; Heart Disorder in his brother and sister; No Known Problems in his son. SOCIAL HISTORY:   He  reports that he quit smoking about 43 years ago. He has a 10.00 pack-year smoking history.  He has never used smokeless tobacco. He reports t thyroid: not enlarged, symmetric, no tenderness/mass/nodules, no carotid bruit or JVD   Back:   Symmetric, no curvature, ROM normal, no CVA tenderness   Lungs:   Clear to auscultation bilaterally, respirations unlabored   Chest Wall:  No tenderness or defo Pneumococcal (Prevnar 13) 01/07/2016, 10/01/2017   • Pneumovax 23 08/27/2014, 10/01/2014   • TDAP 01/05/2015        ASSESSMENT AND OTHER RELEVANT CHRONIC CONDITIONS:   Radha Lucas is a 77year old male who presents for a Medicare Assessment.      PLAN CONDE stroke.     Peripheral vascular disease ([facility]  -     PHYSIATRY - INTERNAL  -     COMP METABOLIC PANEL (14)  -     LIPID PANEL you hi Dr. Lisandro Galvez called the on-call doctor about Mr. Mary Caldwell okay I will relay the message thank you okay I but I  -     Clopidogrel morning daughter. continue to monitor  Increase sertraline to 200 mg since previously on 175 in the nursing home. Recheck in the next 1 months sooner if worsening    Psychophysiological insomnia  -     melatonin 1 MG Oral Tab;  Take 1 tablet (1 mg total pain of left shoulder  -     ORTHOPEDIC - INTERNAL  -     XR SHOULDER, COMPLETE (MIN 2 VIEWS), LEFT (CPT=73030); Future  -     Acetaminophen 500 MG Oral Cap;  Take 1 capsule (500 mg total) by mouth every 6 (six) hours as needed for Pain (max use 6 tabs in 2 (14)  -     HEMOGLOBIN A1C  -     Cancel: MICROALB/CREAT RATIO, RANDOM URINE  -     OPHTHALMOLOGY - INTERNAL  -     HEMOGLOBIN A1C  -     MICROALB/CREAT RATIO, RANDOM URINE; Future  -     tamsulosin HCl 0.4 MG Oral Cap;  Take 1 capsule (0.4 mg total) by bonny a good energy level?: Stretching; Other  How would you describe your daily physical activity?: Moderate  How would you describe your current health state?: Fair  How do you maintain positive mental well-being?: Visiting Family    This section provided for q concentrates   Clients of institutions for the mentally retarded   Persons who live in the same house as a HepB virus carrier   Homosexual men   Illicit injectable drug abusers     Tetanus Toxoid  Only covered with a cut with metal- TD and TDaP Not covered

## 2019-08-16 ENCOUNTER — MED REC SCAN ONLY (OUTPATIENT)
Dept: FAMILY MEDICINE CLINIC | Facility: CLINIC | Age: 69
End: 2019-08-16

## 2019-08-16 ENCOUNTER — TELEPHONE (OUTPATIENT)
Dept: FAMILY MEDICINE CLINIC | Facility: CLINIC | Age: 69
End: 2019-08-16

## 2019-08-16 LAB
BASOPHIL %: 0.8 (ref 0–1.6)
BASOPHIL %: 1.2 (ref 0–1.6)
BASOPHIL ABSOLUTE: 0 (ref 0–0.2)
BASOPHIL ABSOLUTE: 0.1 (ref 0–0.2)
BUN/CREATININE RATIO: 1 RATIO (ref 12–20)
BUN: 7 MG/DL (ref 5–28)
CALCIUM: 10.1 MG/DL (ref 8.4–10.2)
CARBON DIOXIDE, TOTAL: 29 MEQ/L (ref 20–33)
CHLORIDE: 98 MEQ/L (ref 94–111)
CREATININE: 0.7 MG/DL (ref 0.5–1.4)
EOSINOPHIL %: 3.5 (ref 2–6)
EOSINOPHIL %: 4.1 (ref 2–6)
EOSINOPHIL ABSOLUTE: 0.2 (ref 0–0.5)
EOSINOPHIL ABSOLUTE: 0.2 (ref 0–0.5)
GFR AFRICAN AMERICAN: 134.9
GFR NON-AFRICAN AMERICAN: 111.5
GLUCOSE: 142 MG/DL (ref 64–112)
HEMATOCRIT: 39.9 % (ref 42–52)
HEMATOCRIT: 41.6 % (ref 42–52)
HEMOGLOBIN: 12.5 G/DL (ref 14–18)
HEMOGLOBIN: 12.7 G/DL (ref 14–18)
LYMPHOCYTE %: 21.6 (ref 20–45)
LYMPHOCYTE %: 26.4 (ref 20–45)
LYMPHOCYTE ABSOLUTE: 1.2 (ref 0.8–3)
LYMPHOCYTE ABSOLUTE: 1.7 (ref 0.8–3)
MCH: 28 PG (ref 27–31)
MCH: 28.6 PG (ref 27–31)
MCHC: 30.6 G/DL (ref 32–36)
MCHC: 31.2 G/DL (ref 32–36)
MCV: 89.6 FL (ref 80–94)
MCV: 93.4 FL (ref 80–94)
MONOCYTE %: 6.6 (ref 2–15)
MONOCYTE %: 7.7 (ref 2–15)
MONOCYTE ABSOLUTE: 0.4 (ref 0.2–1)
MONOCYTE ABSOLUTE: 0.5 (ref 0.2–1)
MPV: 4.9 FL (ref 4.4–10)
MPV: 6.3 (ref 4.4–10)
NEUTROPHIL %: 61.3 (ref 40–70)
NEUTROPHIL %: 66.9 (ref 40–70)
NEUTROPHIL ABSOLUTE: 3.7 (ref 1.4–6.8)
NEUTROPHIL ABSOLUTE: 4 (ref 1.4–6.8)
PLATELET COUNT: 223 (ref 150–450)
PLATELET COUNT: 268 (ref 150–450)
POTASSIUM: 3.7 MEQ/L (ref 3.5–5.1)
RBC: 4.46 /HPF (ref 4.7–6.2)
RBC: 4.46 /HPF (ref 4.7–6.2)
RDW: 14.2 % (ref 11.5–15.5)
RDW: 15 % (ref 11.5–15.5)
SODIUM, SERUM: 140 MEQ/L (ref 131–145)
WBC: 5.5 /HPF (ref 4.8–10.8)
WBC: 6.6 /HPF (ref 4.8–10.8)

## 2019-08-16 RX ORDER — IPRATROPIUM BROMIDE AND ALBUTEROL SULFATE 2.5; .5 MG/3ML; MG/3ML
SOLUTION RESPIRATORY (INHALATION)
Qty: 1080 ML | Refills: 1 | OUTPATIENT
Start: 2019-08-16

## 2019-08-16 NOTE — TELEPHONE ENCOUNTER
[facility] called asking if a change can be made on the insulin needles, since the ones  asked for dont exist.

## 2019-08-16 NOTE — TELEPHONE ENCOUNTER
Called pharmacy and s/w pharmacist. Confirmed ok to change the insulin needles and confirmed Ipratropium-albuterol solution to quantity of 60 vials (not 60 mL).

## 2019-08-16 NOTE — TELEPHONE ENCOUNTER
[facility] called asking to verify how many pills/tablets Dr wants for patients medication.  For Ipratrpium-Albuterol

## 2019-08-17 ENCOUNTER — TELEPHONE (OUTPATIENT)
Dept: OPHTHALMOLOGY | Age: 69
End: 2019-08-17

## 2019-08-19 ENCOUNTER — TELEPHONE (OUTPATIENT)
Dept: FAMILY MEDICINE CLINIC | Facility: CLINIC | Age: 69
End: 2019-08-19

## 2019-08-19 ENCOUNTER — EXTERNAL RECORD (OUTPATIENT)
Dept: HEALTH INFORMATION MANAGEMENT | Facility: OTHER | Age: 69
End: 2019-08-19

## 2019-08-19 DIAGNOSIS — M79.605 CHRONIC PAIN OF LOWER EXTREMITY, BILATERAL: Primary | ICD-10-CM

## 2019-08-19 DIAGNOSIS — I62.9 INTRACRANIAL HEMORRHAGE (HCC): ICD-10-CM

## 2019-08-19 DIAGNOSIS — G89.29 CHRONIC PAIN OF LOWER EXTREMITY, BILATERAL: Primary | ICD-10-CM

## 2019-08-19 DIAGNOSIS — G81.94 ACUTE LEFT HEMIPARESIS (HCC): Primary | ICD-10-CM

## 2019-08-19 DIAGNOSIS — G44.89 OTHER HEADACHE SYNDROME: ICD-10-CM

## 2019-08-19 DIAGNOSIS — M25.512 ACUTE PAIN OF LEFT SHOULDER: ICD-10-CM

## 2019-08-19 DIAGNOSIS — M79.604 CHRONIC PAIN OF LOWER EXTREMITY, BILATERAL: Primary | ICD-10-CM

## 2019-08-19 NOTE — TELEPHONE ENCOUNTER
Patient was recently released home from [address] [place] [address], Patient was infomred at time of discharge he would have all his medical equipment. Patient arrived home and no equipment was ever delivered. Patient was told to request by primary.  Patient is in need

## 2019-08-19 NOTE — TELEPHONE ENCOUNTER
[facility] sent provider prescription message of Tramadol 50MG recently filled and Butalbital-APAP-Caff-Cod -80-30 MG, also recently filled are both members of the short acting narcotic class and may represent duplicate tehrapy.       traMADol HCl 50

## 2019-08-19 NOTE — TELEPHONE ENCOUNTER
Pharmacy would like MD to acknowledged  the recently proscribed medication     Diclofenac Sodium 1 % Transdermal Gel 100 g 5 8/15/2019    Sig:   Apply 1 g topically 4 (four) times daily.  Left shoulder for pain        The toxics effects may be increased wit

## 2019-08-20 RX ORDER — BUTALBITAL/ASPIRIN/CAFFEINE 50-325-40
1 CAPSULE ORAL EVERY 4 HOURS PRN
Qty: 30 CAPSULE | Refills: 0 | OUTPATIENT
Start: 2019-08-20

## 2019-08-20 NOTE — TELEPHONE ENCOUNTER
Butalbital-APAP-Caff-Cod -77-30 MG Oral Cap 30 capsule 0 8/15/2019    Sig:   Take 1 capsule by mouth every 4 (four) hours as needed for Headaches.      Rx sent 8/15/19

## 2019-08-20 NOTE — TELEPHONE ENCOUNTER
Diclofenac Sodium 1 % Transdermal Gel 100 g 5 8/15/2019    Sig:   Apply 1 g topically 4 (four) times daily.  Left shoulder for pain       MEDICATION ORDERED 8/15/19

## 2019-08-20 NOTE — TELEPHONE ENCOUNTER
Tramadol given rarely for severe pain. Butalbital is for headaches that occurred from stroke and uses 1-2x monthly.  Please prescribe as written

## 2019-08-20 NOTE — TELEPHONE ENCOUNTER
Confirmed with pharmacy and Tramadol and Diclofenac have been picked up. Butalbital is not covered by insurance.    See PA request

## 2019-08-20 NOTE — TELEPHONE ENCOUNTER
This is topical not orally ingested . This is why we prescribed topically due to minimal GI absorption. Please prescribe diclofenac.

## 2019-08-21 ENCOUNTER — HOSPITAL ENCOUNTER (EMERGENCY)
Facility: HOSPITAL | Age: 69
Discharge: HOME | End: 2019-08-21
Attending: EMERGENCY MEDICINE
Payer: MEDICARE

## 2019-08-21 ENCOUNTER — TELEPHONE (OUTPATIENT)
Dept: GASTROENTEROLOGY | Age: 69
End: 2019-08-21

## 2019-08-21 ENCOUNTER — TELEPHONE (OUTPATIENT)
Dept: FAMILY MEDICINE CLINIC | Facility: CLINIC | Age: 69
End: 2019-08-21

## 2019-08-21 ENCOUNTER — TELEPHONE (OUTPATIENT)
Dept: UROLOGY | Age: 69
End: 2019-08-21

## 2019-08-21 VITALS
DIASTOLIC BLOOD PRESSURE: 71 MMHG | OXYGEN SATURATION: 99 % | RESPIRATION RATE: 17 BRPM | HEART RATE: 78 BPM | TEMPERATURE: 98 F | HEIGHT: 66 IN | WEIGHT: 156 LBS | SYSTOLIC BLOOD PRESSURE: 135 MMHG | BODY MASS INDEX: 25.07 KG/M2

## 2019-08-21 DIAGNOSIS — N39.0 URINARY TRACT INFECTION ASSOCIATED WITH INDWELLING URETHRAL CATHETER, INITIAL ENCOUNTER (HCC): Primary | ICD-10-CM

## 2019-08-21 DIAGNOSIS — T83.511A URINARY TRACT INFECTION ASSOCIATED WITH INDWELLING URETHRAL CATHETER, INITIAL ENCOUNTER (HCC): Primary | ICD-10-CM

## 2019-08-21 LAB
ALBUMIN SERPL-MCNC: 3.5 G/DL (ref 3.4–5)
ALBUMIN/GLOB SERPL: 1 {RATIO} (ref 1–2)
ALP LIVER SERPL-CCNC: 86 U/L (ref 45–117)
ALT SERPL-CCNC: 29 U/L (ref 16–61)
ANION GAP SERPL CALC-SCNC: 7 MMOL/L (ref 0–18)
AST SERPL-CCNC: 16 U/L (ref 15–37)
BASOPHILS # BLD AUTO: 0.03 X10(3) UL (ref 0–0.2)
BASOPHILS NFR BLD AUTO: 0.4 %
BILIRUB SERPL-MCNC: 0.4 MG/DL (ref 0.1–2)
BILIRUB UR QL STRIP.AUTO: NEGATIVE
BUN BLD-MCNC: 7 MG/DL (ref 7–18)
BUN/CREAT SERPL: 10.9 (ref 10–20)
CALCIUM BLD-MCNC: 9.2 MG/DL (ref 8.5–10.1)
CHLORIDE SERPL-SCNC: 105 MMOL/L (ref 98–112)
CLARITY UR REFRACT.AUTO: CLEAR
CO2 SERPL-SCNC: 27 MMOL/L (ref 21–32)
COLOR UR AUTO: YELLOW
CREAT BLD-MCNC: 0.64 MG/DL (ref 0.7–1.3)
DEPRECATED RDW RBC AUTO: 46.4 FL (ref 35.1–46.3)
EOSINOPHIL # BLD AUTO: 0.21 X10(3) UL (ref 0–0.7)
EOSINOPHIL NFR BLD AUTO: 3.1 %
ERYTHROCYTE [DISTWIDTH] IN BLOOD BY AUTOMATED COUNT: 15 % (ref 11–15)
GLOBULIN PLAS-MCNC: 3.5 G/DL (ref 2.8–4.4)
GLUCOSE BLD-MCNC: 122 MG/DL (ref 70–99)
GLUCOSE UR STRIP.AUTO-MCNC: NEGATIVE MG/DL
HCT VFR BLD AUTO: 38.9 % (ref 39–53)
HGB BLD-MCNC: 12.8 G/DL (ref 13–17.5)
IMM GRANULOCYTES # BLD AUTO: 0.03 X10(3) UL (ref 0–1)
IMM GRANULOCYTES NFR BLD: 0.4 %
KETONES UR STRIP.AUTO-MCNC: NEGATIVE MG/DL
LYMPHOCYTES # BLD AUTO: 1.74 X10(3) UL (ref 1–4)
LYMPHOCYTES NFR BLD AUTO: 25.6 %
M PROTEIN MFR SERPL ELPH: 7 G/DL (ref 6.4–8.2)
MCH RBC QN AUTO: 27.8 PG (ref 26–34)
MCHC RBC AUTO-ENTMCNC: 32.9 G/DL (ref 31–37)
MCV RBC AUTO: 84.6 FL (ref 80–100)
MONOCYTES # BLD AUTO: 0.48 X10(3) UL (ref 0.1–1)
MONOCYTES NFR BLD AUTO: 7.1 %
NEUTROPHILS # BLD AUTO: 4.3 X10 (3) UL (ref 1.5–7.7)
NEUTROPHILS # BLD AUTO: 4.3 X10(3) UL (ref 1.5–7.7)
NEUTROPHILS NFR BLD AUTO: 63.4 %
NITRITE UR QL STRIP.AUTO: POSITIVE
OSMOLALITY SERPL CALC.SUM OF ELEC: 287 MOSM/KG (ref 275–295)
PH UR STRIP.AUTO: 6.5 [PH] (ref 4.5–8)
PLATELET # BLD AUTO: 199 10(3)UL (ref 150–450)
POTASSIUM SERPL-SCNC: 3.4 MMOL/L (ref 3.5–5.1)
PROT UR STRIP.AUTO-MCNC: >=300 MG/DL
RBC # BLD AUTO: 4.6 X10(6)UL (ref 3.8–5.8)
RBC #/AREA URNS AUTO: >10 /HPF
SODIUM SERPL-SCNC: 139 MMOL/L (ref 136–145)
SP GR UR STRIP.AUTO: >=1.03 (ref 1–1.03)
UROBILINOGEN UR STRIP.AUTO-MCNC: 0.2 MG/DL
WBC # BLD AUTO: 6.8 X10(3) UL (ref 4–11)

## 2019-08-21 PROCEDURE — 80053 COMPREHEN METABOLIC PANEL: CPT | Performed by: PHYSICIAN ASSISTANT

## 2019-08-21 PROCEDURE — 87086 URINE CULTURE/COLONY COUNT: CPT | Performed by: EMERGENCY MEDICINE

## 2019-08-21 PROCEDURE — 81001 URINALYSIS AUTO W/SCOPE: CPT | Performed by: EMERGENCY MEDICINE

## 2019-08-21 PROCEDURE — 96365 THER/PROPH/DIAG IV INF INIT: CPT

## 2019-08-21 PROCEDURE — 81001 URINALYSIS AUTO W/SCOPE: CPT | Performed by: PHYSICIAN ASSISTANT

## 2019-08-21 PROCEDURE — 99284 EMERGENCY DEPT VISIT MOD MDM: CPT

## 2019-08-21 PROCEDURE — 85025 COMPLETE CBC W/AUTO DIFF WBC: CPT | Performed by: PHYSICIAN ASSISTANT

## 2019-08-21 RX ORDER — CEPHALEXIN 500 MG/1
500 CAPSULE ORAL 2 TIMES DAILY
Qty: 20 CAPSULE | Refills: 0 | Status: SHIPPED | OUTPATIENT
Start: 2019-08-21 | End: 2019-08-31

## 2019-08-21 RX ORDER — PHENAZOPYRIDINE HYDROCHLORIDE 200 MG/1
200 TABLET, FILM COATED ORAL ONCE
Status: COMPLETED | OUTPATIENT
Start: 2019-08-21 | End: 2019-08-21

## 2019-08-21 RX ORDER — PHENAZOPYRIDINE HYDROCHLORIDE 200 MG/1
200 TABLET, FILM COATED ORAL 3 TIMES DAILY PRN
Qty: 6 TABLET | Refills: 0 | Status: SHIPPED | OUTPATIENT
Start: 2019-08-21 | End: 2019-08-28

## 2019-08-21 NOTE — TELEPHONE ENCOUNTER
Patient is having lots of pain with urination, patient was recently see in [facility] and was told he did not have an infection. Urine clear, no blood per daughter Angely.  Patient was referred to urology and when Josey Shankar called the office, they stated th

## 2019-08-21 NOTE — TELEPHONE ENCOUNTER
Prior Auth paperwork faxed to [facility] [employer] to listed number, confirmation received, will send documents to be scanned into patient chart.

## 2019-08-21 NOTE — TELEPHONE ENCOUNTER
Patient's daughter was called and informed the office is closed. Patient daughter was recommended to take patient to ER to further evaluate patient's catheter, if patient is not adequately emptying he may develop a greater complication.  Alaina bhatia n

## 2019-08-21 NOTE — TELEPHONE ENCOUNTER
Prior Auth required for ordered lidocaine patches.           Per 8/15/2019 OV:      Chronic pain of lower extremity, bilateral  -     PHYSIATRY - INTERNAL  -     lidocaine 4 % External Patch; Place 2 patches onto the skin daily as needed (left  posterior sh

## 2019-08-21 NOTE — TELEPHONE ENCOUNTER
Prior auth required to fill Butalbital-APAP-Caff-Cod -35-30 MG Oral Cap.      Per 8/15/2019 OV     Acute left hemiparesis (HCC)  Nontraumatic subcortical hemorrhage of right cerebral hemisphere (HCC)  Intracranial hemorrhage ([facility]  -     NEURO - INTER

## 2019-08-21 NOTE — TELEPHONE ENCOUNTER
Miguel BREWSTER attempted to call the referred Urology office at [phone], Dr. Ephraim Clarke. To attempt to have patient be seen ASAP, based on s/s. Office is now closed.

## 2019-08-21 NOTE — ED INITIAL ASSESSMENT (HPI)
Pt to ED via ambulance from home for \"painful urination. \" Pt has indwelling catheter that he has had placed for multiple weeks for urinary retention. Pt denies any fevers or abdominal pain.

## 2019-08-22 ENCOUNTER — TELEPHONE (OUTPATIENT)
Dept: FAMILY MEDICINE CLINIC | Facility: CLINIC | Age: 69
End: 2019-08-22

## 2019-08-22 DIAGNOSIS — G81.94 ACUTE LEFT HEMIPARESIS (HCC): Primary | ICD-10-CM

## 2019-08-22 NOTE — TELEPHONE ENCOUNTER
Signed DME form.  Await approval from [place]  Fax over orders to [facility] equipment: [phone]- include the original physician order, face to face note, copy of insurance card and pt demographic form

## 2019-08-22 NOTE — ED NOTES
Daughter at bedside, updated on medications given. Plan for pt discharge home communicated to daughter.

## 2019-08-22 NOTE — ED PROVIDER NOTES
Patient Seen in: [facility] Emergency Department    History   Patient presents with:  Cath Tube Problem (gastrointestinal, urinary, integumentary)    Stated Complaint: painful urination, has chronic hicks for 4 weeks    HPI    Roly Calvo is a 68-year-ol [facility] Jane Wills/Raj 2/22/2017   • Nontraumatic intracerebral hemorrhage in hemisphere, subcortical [facility])    • Personal history of malignant neoplasm of prostate    • Platelet dysfunction due to drugs 3/2/2019   • TIA (transient ischemic attack) 8/25/2 heart sounds and intact distal pulses.     Pulmonary/Chest: Effort normal and breath sounds normal.   Abdominal: Normal bowel sounds appreciated in all 4 quadrants, non-distended in appearance without skin changes, soft, non-tender in all 4 quadrants to lig ------                     CBC W/ DIFFERENTIAL[[id number]]          Abnormal            Final result                 Please view results for these tests on the individual orders.    RAINBOW DRAW BLUE   RAINBOW DRAW LAVENDER   RAINBOW DRAW LIGHT GREEN   RAINB

## 2019-08-22 NOTE — TELEPHONE ENCOUNTER
Dwaine Dear worker for home health with [facility]. Is calling For equipment and home health approvals. Jose Urrutia states she will go into detail when the nurse calls.

## 2019-08-22 NOTE — TELEPHONE ENCOUNTER
Nurse Hailey Shirley with [facility] called stating she needs a  Referral in order for patient to be seen, the referral should be for the following codes codes 413 3504 w/ Modifier 25. Catheter change code 73932.  Also pt will be seeing NP Karl Barrett her NPI

## 2019-08-22 NOTE — ED NOTES
This RN spoke with Daughter Angely over phone. Pt has complicated hicks insertions, requiring scope. Shelby STANLEY made aware, plan communicated to this RN to change collection bag and collect sample. Collection bag changed and sample obtained for lab.

## 2019-08-23 ENCOUNTER — TELEPHONE (OUTPATIENT)
Dept: FAMILY MEDICINE CLINIC | Facility: CLINIC | Age: 69
End: 2019-08-23

## 2019-08-23 DIAGNOSIS — G81.94 ACUTE LEFT HEMIPARESIS (HCC): Primary | ICD-10-CM

## 2019-08-23 NOTE — TELEPHONE ENCOUNTER
[facility] called saying patient will be seeing Alexa Jonas  NPI [id number], CPT code [id number]. A new referral must be placed through [facility] [place] with this doctors name.   Please call the when completed at [phone]  Fax # [phone]

## 2019-08-26 ENCOUNTER — TELEPHONE (OUTPATIENT)
Dept: FAMILY MEDICINE CLINIC | Facility: CLINIC | Age: 69
End: 2019-08-26

## 2019-08-26 NOTE — TELEPHONE ENCOUNTER
Order revised with updated equipment list/codes and faxed to [facility] medical equipment. Fax confirmed.

## 2019-08-26 NOTE — TELEPHONE ENCOUNTER
Ananda Vidales with [address] [facility] Urology Department, called asking if the referral for the patient has been approved with [facility]. Please call Ananda Vidales at [phone].

## 2019-08-27 PROBLEM — I69.354 HEMIPARESIS AFFECTING LEFT SIDE AS LATE EFFECT OF STROKE (HCC): Status: ACTIVE | Noted: 2019-03-02

## 2019-08-27 NOTE — TELEPHONE ENCOUNTER
Referral # Creation Date Referral Status Status Update    [id number] 08/26/2019 Authorized 08/27/2019: Status History     Called and left message for Jay Calhoun advising above

## 2019-08-29 ENCOUNTER — TELEPHONE (OUTPATIENT)
Dept: FAMILY MEDICINE CLINIC | Facility: CLINIC | Age: 69
End: 2019-08-29

## 2019-08-29 NOTE — TELEPHONE ENCOUNTER
I did notify [facility] and they are aware Butab/Caff/Tyl/Codeine is covered and they will fill. The Lidocaine 5% patches however are not. I tried to call # on file, it's patient's daughter. Her mail box full.  I was going to suggest Lidocaine patches OTC-I

## 2019-08-30 ENCOUNTER — TELEPHONE (OUTPATIENT)
Dept: FAMILY MEDICINE CLINIC | Facility: CLINIC | Age: 69
End: 2019-08-30

## 2019-08-30 NOTE — TELEPHONE ENCOUNTER
Chantelle Payton from [address] [place] [address], called to let Dr. Sarah Ceja pt cancelled visit for today for his PT. And they are decreasing pts visit to only one this week.

## 2019-09-02 ENCOUNTER — EXTERNAL RECORD (OUTPATIENT)
Dept: OTHER | Age: 69
End: 2019-09-02

## 2019-09-02 LAB
BEDSIDE GLUCOSE: 203 MG/DL (ref 70–110)
BEDSIDE VENOUS BASE EXCESS: 1 MMOL/L (ref -2–3)
BEDSIDE VENOUS HCO3: 26 MMOL/L (ref 23–28)
BEDSIDE VENOUS LACTATE: 2.05 MMOL/L (ref 0.9–2)
BEDSIDE VENOUS O2 SATURATION: 35 %
BEDSIDE VENOUS PCO2: 45 MMHG (ref 41–51)
BEDSIDE VENOUS PH: 7.37 (ref 7.31–7.41)
BEDSIDE VENOUS PO2: 22 MMHG
BEDSIDE VENOUS TCO2: 28 MMOL/L (ref 24–29)

## 2019-09-02 PROCEDURE — 99223 1ST HOSP IP/OBS HIGH 75: CPT | Performed by: HOSPITALIST

## 2019-09-03 ENCOUNTER — EXTERNAL RECORD (OUTPATIENT)
Dept: ORTHOPEDICS | Age: 69
End: 2019-09-03

## 2019-09-03 ENCOUNTER — TELEPHONE (OUTPATIENT)
Dept: FAMILY MEDICINE CLINIC | Facility: CLINIC | Age: 69
End: 2019-09-03

## 2019-09-03 LAB
*MEAN CORPUSCULAR HGB CONC: 33.8 G/DL (ref 32.5–35.8)
A/G RATIO: 1.38 (ref 1.1–2.4)
ALANINE AMINOTRANSFE: 30 U/L (ref 7–52)
ALBUMIN, SERUM (ALB): 4 G/DL (ref 3.5–5.7)
ALKALINE PHOSPHATASE (ALK): 63 U/L (ref 34–104)
ANION GAP: 5 MEQ/L (ref 8–16)
ASPARTATE AMINOTRANS: 21 U/L (ref 13–39)
BASOPHIL AUTOMATED: 0.6 %
BASOPHILS: 0 (ref 0–0.2)
BEDSIDE GLUCOSE: 127 MG/DL (ref 70–110)
BEDSIDE GLUCOSE: 144 MG/DL (ref 70–110)
BEDSIDE GLUCOSE: 173 MG/DL (ref 70–110)
BEDSIDE GLUCOSE: 178 MG/DL (ref 70–110)
BEDSIDE GLUCOSE: 207 MG/DL (ref 70–110)
BILIRUBIN, TOTAL: 0.6 MG/DL (ref 0.2–0.8)
BLOOD UREA NITROGEN (BUN): 7 MG/DL (ref 7–25)
BUN/CREATININE RATIO: 10.9 (ref 7.4–23)
CALCIUM, SERUM: 9.5 MG/DL (ref 8.6–10.3)
CARBON DIOXIDE: 29 MMOL/L (ref 21–31)
CHLORIDE, SERUM: 103 MMOL/L (ref 98–107)
CREATININE: 0.64 MG/DL (ref 0.7–1.3)
EOSINOPHIL AUTOMATED: 2.6 %
EOSINOPHILS: 0.1 (ref 0–0.5)
EST GLOMERULAR FILTRATION RATE: > 60 1.73M SQ
ESTIMATED AVERAGE GLUCOSE: 134 MG/DL
GLUCOSE: 135 MG/DL (ref 70–99)
HEMATOCRIT: 39.2 % (ref 38.6–49.2)
HEMOGLOBIN A1C (GLYCOSYLATED): 6.3 %
HEMOGLOBIN: 13.3 GM/DL (ref 13–17)
K (POTASSIUM, SERUM): 3.2 MMOL/L (ref 3.5–5.1)
LYMPHOCYTE AUTOMATED: 27.4 %
LYMPHOCYTES: 1.3 (ref 0.6–3.4)
MEAN CORPUSCULAR HGB: 28.3 PG (ref 26–34)
MEAN CORPUSCULAR VOL: 83.6 FL (ref 80–100)
MEAN PLATELET VOLUME: 7.7 FL (ref 6.8–10.2)
MG (MAGNESIUM, SERUM: 2 MG/DL (ref 1.6–2.6)
MONOCYTE AUTOMATED: 8.5 %
MONOCYTES: 0.4 (ref 0.3–1)
NA (SODIUM, SERUM): 137 MMOL/L (ref 133–144)
NEUTROPHIL ABSOLUTE: 3 (ref 1.7–7.7)
NEUTROPHIL AUTOMATED: 60.9 %
PLATELET COUNT: 176 K/MM3 (ref 150–450)
PROTEIN TOTAL: 6.9 G/DL (ref 6.4–8.9)
RED BLOOD CELL COUNT: 4.69 M/MM3 (ref 4.34–5.6)
RED CELL DISTRIBUTIO: 16.5 % (ref 11.9–15.9)
WHITE BLOOD CELL COU: 4.9 K/MM3 (ref 4–11)

## 2019-09-03 PROCEDURE — 99222 1ST HOSP IP/OBS MODERATE 55: CPT | Performed by: ORTHOPAEDIC SURGERY

## 2019-09-03 PROCEDURE — 99233 SBSQ HOSP IP/OBS HIGH 50: CPT | Performed by: HOSPITALIST

## 2019-09-03 NOTE — TELEPHONE ENCOUNTER
Received notification from [facility] that patient cancelled appointment today and decreased ton 1 visit this week. Faxed signed order. Received fax confirmation.

## 2019-09-04 ENCOUNTER — TELEPHONE (OUTPATIENT)
Dept: FAMILY MEDICINE CLINIC | Facility: CLINIC | Age: 69
End: 2019-09-04

## 2019-09-04 LAB
*MEAN CORPUSCULAR HGB CONC: 33.8 G/DL (ref 32.5–35.8)
ANION GAP: 7 MEQ/L (ref 8–16)
BASOPHIL AUTOMATED: 0.5 %
BASOPHILS: 0 (ref 0–0.2)
BEDSIDE GLUCOSE: 148 MG/DL (ref 70–110)
BEDSIDE GLUCOSE: 267 MG/DL (ref 70–110)
BLOOD UREA NITROGEN (BUN): 9 MG/DL (ref 7–25)
BUN/CREATININE RATIO: 13.2 (ref 7.4–23)
CALCIUM, SERUM: 9.6 MG/DL (ref 8.6–10.3)
CARBON DIOXIDE: 30 MMOL/L (ref 21–31)
CHLORIDE, SERUM: 102 MMOL/L (ref 98–107)
CREATININE: 0.68 MG/DL (ref 0.7–1.3)
EOSINOPHIL AUTOMATED: 3.6 %
EOSINOPHILS: 0.2 (ref 0–0.5)
EST GLOMERULAR FILTRATION RATE: > 60 1.73M SQ
GLUCOSE: 125 MG/DL (ref 70–99)
HEMATOCRIT: 37.1 % (ref 38.6–49.2)
HEMOGLOBIN: 12.5 GM/DL (ref 13–17)
K (POTASSIUM, SERUM): 3.9 MMOL/L (ref 3.5–5.1)
LYMPHOCYTE AUTOMATED: 33.8 %
LYMPHOCYTES: 1.7 (ref 0.6–3.4)
MEAN CORPUSCULAR HGB: 28.2 PG (ref 26–34)
MEAN CORPUSCULAR VOL: 83.4 FL (ref 80–100)
MEAN PLATELET VOLUME: 7.7 FL (ref 6.8–10.2)
MONOCYTE AUTOMATED: 7.9 %
MONOCYTES: 0.4 (ref 0.3–1)
NA (SODIUM, SERUM): 139 MMOL/L (ref 133–144)
NEUTROPHIL ABSOLUTE: 2.7 (ref 1.7–7.7)
NEUTROPHIL AUTOMATED: 54.2 %
PLATELET COUNT: 167 K/MM3 (ref 150–450)
RED BLOOD CELL COUNT: 4.45 M/MM3 (ref 4.34–5.6)
RED CELL DISTRIBUTIO: 16.5 % (ref 11.9–15.9)
WHITE BLOOD CELL COU: 4.9 K/MM3 (ref 4–11)

## 2019-09-04 PROCEDURE — 99239 HOSP IP/OBS DSCHRG MGMT >30: CPT | Performed by: HOSPITALIST

## 2019-09-04 NOTE — TELEPHONE ENCOUNTER
Joseph from [address] [place] [address] called stating [employer] is requesting a Referral from pts PCP for pts equipment. Wheel chair, Hospital Bed and mattress, walker. Please call Joseph fo any questions to [phone].

## 2019-09-04 NOTE — TELEPHONE ENCOUNTER
NISATCALINE        (FYI for [facility] - spoke with daughter Shelby Groverer and patient has not heard from [employer])

## 2019-09-06 ENCOUNTER — MED REC SCAN ONLY (OUTPATIENT)
Dept: FAMILY MEDICINE CLINIC | Facility: CLINIC | Age: 69
End: 2019-09-06

## 2019-09-08 ENCOUNTER — PATIENT OUTREACH (OUTPATIENT)
Dept: FAMILY MEDICINE CLINIC | Facility: CLINIC | Age: 69
End: 2019-09-08

## 2019-09-08 NOTE — PROGRESS NOTES
Left message for daughter to schedule an follow up appointment and also to go over forms that Dr. Michaela Yepez needs to fill out.

## 2019-09-09 ENCOUNTER — TELEPHONE (OUTPATIENT)
Dept: INTERNAL MEDICINE CLINIC | Facility: CLINIC | Age: 69
End: 2019-09-09

## 2019-09-09 NOTE — TELEPHONE ENCOUNTER
Future Appointments   Date Time Provider Edmond Warren   9/23/2019  2:15 PM TRICIA Alexander [facility] [address]

## 2019-09-09 NOTE — TELEPHONE ENCOUNTER
Anai is a [address] [facility] [place] [facility]""!     Spoke with Lexus Faulkner, Will fax View and Chew order over     Fax confirmed

## 2019-09-10 ENCOUNTER — OFFICE VISIT (OUTPATIENT)
Dept: FAMILY MEDICINE CLINIC | Facility: CLINIC | Age: 69
End: 2019-09-10
Payer: MEDICARE

## 2019-09-10 ENCOUNTER — TELEPHONE (OUTPATIENT)
Dept: FAMILY MEDICINE CLINIC | Facility: CLINIC | Age: 69
End: 2019-09-10

## 2019-09-10 VITALS
RESPIRATION RATE: 15 BRPM | OXYGEN SATURATION: 99 % | TEMPERATURE: 98 F | SYSTOLIC BLOOD PRESSURE: 122 MMHG | DIASTOLIC BLOOD PRESSURE: 76 MMHG | HEIGHT: 66 IN | BODY MASS INDEX: 25.07 KG/M2 | WEIGHT: 156 LBS | HEART RATE: 93 BPM

## 2019-09-10 DIAGNOSIS — N39.0 URINARY TRACT INFECTION ASSOCIATED WITH INDWELLING URETHRAL CATHETER, INITIAL ENCOUNTER (HCC): ICD-10-CM

## 2019-09-10 DIAGNOSIS — I73.9 PVD (PERIPHERAL VASCULAR DISEASE) (HCC): ICD-10-CM

## 2019-09-10 DIAGNOSIS — G57.93 NEUROPATHY INVOLVING BOTH LOWER EXTREMITIES: ICD-10-CM

## 2019-09-10 DIAGNOSIS — E11.42 TYPE 2 DIABETES MELLITUS WITH DIABETIC POLYNEUROPATHY, WITH LONG-TERM CURRENT USE OF INSULIN (HCC): ICD-10-CM

## 2019-09-10 DIAGNOSIS — G81.94 LEFT HEMIPARESIS (HCC): ICD-10-CM

## 2019-09-10 DIAGNOSIS — M79.604 CHRONIC PAIN OF LOWER EXTREMITY, BILATERAL: ICD-10-CM

## 2019-09-10 DIAGNOSIS — Z79.4 TYPE 2 DIABETES MELLITUS WITH DIABETIC POLYNEUROPATHY, WITH LONG-TERM CURRENT USE OF INSULIN (HCC): ICD-10-CM

## 2019-09-10 DIAGNOSIS — M79.605 LEFT LEG PAIN: ICD-10-CM

## 2019-09-10 DIAGNOSIS — I62.9 INTRACRANIAL HEMORRHAGE (HCC): ICD-10-CM

## 2019-09-10 DIAGNOSIS — M25.512 PAIN IN JOINT OF LEFT SHOULDER: Primary | ICD-10-CM

## 2019-09-10 DIAGNOSIS — M79.605 CHRONIC PAIN OF LOWER EXTREMITY, BILATERAL: ICD-10-CM

## 2019-09-10 DIAGNOSIS — G89.29 CHRONIC PAIN OF LOWER EXTREMITY, BILATERAL: ICD-10-CM

## 2019-09-10 DIAGNOSIS — T83.511A URINARY TRACT INFECTION ASSOCIATED WITH INDWELLING URETHRAL CATHETER, INITIAL ENCOUNTER (HCC): ICD-10-CM

## 2019-09-10 DIAGNOSIS — S43.005D DISLOCATION OF LEFT SHOULDER JOINT, SUBSEQUENT ENCOUNTER: ICD-10-CM

## 2019-09-10 PROCEDURE — 99215 OFFICE O/P EST HI 40 MIN: CPT | Performed by: FAMILY MEDICINE

## 2019-09-10 RX ORDER — GABAPENTIN 300 MG/1
CAPSULE ORAL
Qty: 180 CAPSULE | Refills: 0 | Status: SHIPPED | OUTPATIENT
Start: 2019-09-10 | End: 2020-01-10

## 2019-09-10 RX ORDER — TRAMADOL HYDROCHLORIDE 50 MG/1
50 TABLET ORAL EVERY 6 HOURS PRN
Qty: 30 TABLET | Refills: 0 | Status: SHIPPED | OUTPATIENT
Start: 2019-09-10 | End: 2020-01-06

## 2019-09-10 NOTE — PROGRESS NOTES
CHIEF COMPLAINT: Patient presents with:  Forms Completion  Pain: Tramadol not helping. Patient's daughter requesting [facility] [place].     HPI:     Ronel Mohan is a 68year old male presents for form completion for caretaker and housing.   Daughter states she is mo evaluated by Collette Must advocate orthopedist during his hospital stay x-ray of the shoulder was taken showed increased space in the glenohumeral space which apparently is common after dislocation.   Patient has a history of fall in the nursing home wit • Type II or unspecified type diabetes mellitus without mention of complication, not stated as uncontrolled    • Unspecified essential hypertension    • [facility] 4/22/2015   • Vertigo 1/26/2015      Past Surgical History:   Procedure Hareve Endow % External Patch Place 2 patches onto the skin daily as needed (left  posterior shoulder, diclofenac gel superior shoulder).  Disp: 30 patch Rfl: 3   ipratropium-albuterol 0.5-2.5 (3) MG/3ML Inhalation Solution Take 3 mL by nebulization 4 (four) times daily (four) times daily. Left shoulder for pain Disp: 100 g Rfl: 5   traMADol HCl 50 MG Oral Tab Take 0.5 tablets (25 mg total) by mouth every 6 (six) hours as needed for Pain.  Disp: 28 tablet Rfl: 1   Insulin Aspart Pen 100 UNIT/ML Subcutaneous Solution Pen-in ANGIOEDEMA    Comment:Exacerbation of tongue/lip angioedema following IV             contrast for abdominal CT 4/2017.     PSFH elements reviewed from today and agreed except as otherwise stated in HPI.  ROS:     Review of Systems  Positive for stated comp extremities  -Increase gabapentin 300 MG Oral Cap; 1 tab po am and 2 tabs po qhs  Dispense: 180 capsule; Refill: 0  Possibly causing left leg pain? PVD remains in the differential    2.  Type 2 diabetes mellitus with diabetic polyneuropathy, with long-term encounter  Continue Vantin twice daily-x14 days   follow-up withUrology on 9/16/2019   if febrile, lethargy, diarrhea, call office  ,  40 minutes were spent face to face with the patient in which over half of that time was spent coordinating and counseling depressive disorder ([facility]     Dissection of aorta (HCC)     Essential hypertension, benign     GERD (gastroesophageal reflux disease)     High grade prostatic intraepithelial neoplasia     Hyperlipidemia     Iliac artery aneurysm, left (HCC)     Insomnia

## 2019-09-10 NOTE — TELEPHONE ENCOUNTER
Faxed signed orders for home health to [facility] at fax [phone]  Fax confirmation received.  Sent to scan

## 2019-09-11 ENCOUNTER — TELEPHONE (OUTPATIENT)
Dept: CARDIOLOGY | Age: 69
End: 2019-09-11

## 2019-09-11 DIAGNOSIS — I73.9 PVD (PERIPHERAL VASCULAR DISEASE) (CMD): Primary | ICD-10-CM

## 2019-09-11 NOTE — TELEPHONE ENCOUNTER
Central scheduling- call to schedule diagnostic tests, labs, imaging, physical therapy. Follow prompts.    [phone]    Please schedule today or tomorrow latest.

## 2019-09-12 ENCOUNTER — TELEPHONE (OUTPATIENT)
Dept: FAMILY MEDICINE CLINIC | Facility: CLINIC | Age: 69
End: 2019-09-12

## 2019-09-13 ENCOUNTER — TELEPHONE (OUTPATIENT)
Dept: FAMILY MEDICINE CLINIC | Facility: CLINIC | Age: 69
End: 2019-09-13

## 2019-09-13 ENCOUNTER — HOSPITAL ENCOUNTER (OUTPATIENT)
Facility: HOSPITAL | Age: 69
Setting detail: OBSERVATION
Discharge: HOME HEALTH | End: 2019-09-15
Attending: EMERGENCY MEDICINE | Admitting: HOSPITALIST
Payer: MEDICARE

## 2019-09-13 DIAGNOSIS — R33.9 URINARY RETENTION: Primary | ICD-10-CM

## 2019-09-13 LAB
ALBUMIN SERPL-MCNC: 3.5 G/DL (ref 3.4–5)
ALBUMIN/GLOB SERPL: 1 {RATIO} (ref 1–2)
ALP LIVER SERPL-CCNC: 85 U/L (ref 45–117)
ALT SERPL-CCNC: 26 U/L (ref 16–61)
ANION GAP SERPL CALC-SCNC: 7 MMOL/L (ref 0–18)
AST SERPL-CCNC: 27 U/L (ref 15–37)
BASOPHILS # BLD AUTO: 0.04 X10(3) UL (ref 0–0.2)
BASOPHILS NFR BLD AUTO: 0.5 %
BILIRUB SERPL-MCNC: 0.6 MG/DL (ref 0.1–2)
BUN BLD-MCNC: 11 MG/DL (ref 7–18)
BUN/CREAT SERPL: 13.8 (ref 10–20)
CALCIUM BLD-MCNC: 9.3 MG/DL (ref 8.5–10.1)
CHLORIDE SERPL-SCNC: 98 MMOL/L (ref 98–112)
CO2 SERPL-SCNC: 28 MMOL/L (ref 21–32)
CREAT BLD-MCNC: 0.8 MG/DL (ref 0.7–1.3)
DEPRECATED RDW RBC AUTO: 48.7 FL (ref 35.1–46.3)
EOSINOPHIL # BLD AUTO: 0.1 X10(3) UL (ref 0–0.7)
EOSINOPHIL NFR BLD AUTO: 1.3 %
ERYTHROCYTE [DISTWIDTH] IN BLOOD BY AUTOMATED COUNT: 15.9 % (ref 11–15)
GLOBULIN PLAS-MCNC: 3.5 G/DL (ref 2.8–4.4)
GLUCOSE BLD-MCNC: 104 MG/DL (ref 70–99)
GLUCOSE BLD-MCNC: 137 MG/DL (ref 70–99)
HCT VFR BLD AUTO: 36.8 % (ref 39–53)
HGB BLD-MCNC: 12.3 G/DL (ref 13–17.5)
IMM GRANULOCYTES # BLD AUTO: 0.06 X10(3) UL (ref 0–1)
IMM GRANULOCYTES NFR BLD: 0.8 %
LYMPHOCYTES # BLD AUTO: 1.45 X10(3) UL (ref 1–4)
LYMPHOCYTES NFR BLD AUTO: 19.5 %
M PROTEIN MFR SERPL ELPH: 7 G/DL (ref 6.4–8.2)
MCH RBC QN AUTO: 28 PG (ref 26–34)
MCHC RBC AUTO-ENTMCNC: 33.4 G/DL (ref 31–37)
MCV RBC AUTO: 83.8 FL (ref 80–100)
MONOCYTES # BLD AUTO: 0.54 X10(3) UL (ref 0.1–1)
MONOCYTES NFR BLD AUTO: 7.2 %
NEUTROPHILS # BLD AUTO: 5.26 X10 (3) UL (ref 1.5–7.7)
NEUTROPHILS # BLD AUTO: 5.26 X10(3) UL (ref 1.5–7.7)
NEUTROPHILS NFR BLD AUTO: 70.7 %
OSMOLALITY SERPL CALC.SUM OF ELEC: 278 MOSM/KG (ref 275–295)
PLATELET # BLD AUTO: 179 10(3)UL (ref 150–450)
POTASSIUM SERPL-SCNC: 4.9 MMOL/L (ref 3.5–5.1)
RBC # BLD AUTO: 4.39 X10(6)UL (ref 3.8–5.8)
SODIUM SERPL-SCNC: 133 MMOL/L (ref 136–145)
WBC # BLD AUTO: 7.5 X10(3) UL (ref 4–11)

## 2019-09-13 PROCEDURE — 99220 INITIAL OBSERVATION CARE,LEVL III: CPT | Performed by: HOSPITALIST

## 2019-09-13 RX ORDER — MAGNESIUM OXIDE 400 MG (241.3 MG MAGNESIUM) TABLET
1 TABLET NIGHTLY PRN
Status: DISCONTINUED | OUTPATIENT
Start: 2019-09-13 | End: 2019-09-15

## 2019-09-13 RX ORDER — CLOPIDOGREL BISULFATE 75 MG/1
75 TABLET ORAL DAILY
Status: DISCONTINUED | OUTPATIENT
Start: 2019-09-14 | End: 2019-09-14

## 2019-09-13 RX ORDER — ASPIRIN 81 MG/1
81 TABLET ORAL DAILY
Status: DISCONTINUED | OUTPATIENT
Start: 2019-09-14 | End: 2019-09-15

## 2019-09-13 RX ORDER — LIDOCAINE HYDROCHLORIDE 20 MG/ML
10 JELLY TOPICAL ONCE
Status: COMPLETED | OUTPATIENT
Start: 2019-09-13 | End: 2019-09-13

## 2019-09-13 RX ORDER — IPRATROPIUM BROMIDE AND ALBUTEROL SULFATE 2.5; .5 MG/3ML; MG/3ML
3 SOLUTION RESPIRATORY (INHALATION) 4 TIMES DAILY PRN
Status: DISCONTINUED | OUTPATIENT
Start: 2019-09-13 | End: 2019-09-15

## 2019-09-13 RX ORDER — MORPHINE SULFATE 4 MG/ML
2 INJECTION, SOLUTION INTRAMUSCULAR; INTRAVENOUS ONCE
Status: COMPLETED | OUTPATIENT
Start: 2019-09-13 | End: 2019-09-13

## 2019-09-13 RX ORDER — BACLOFEN 10 MG/1
10 TABLET ORAL 2 TIMES DAILY
Status: DISCONTINUED | OUTPATIENT
Start: 2019-09-13 | End: 2019-09-15

## 2019-09-13 RX ORDER — GABAPENTIN 300 MG/1
300 CAPSULE ORAL 2 TIMES DAILY
Status: DISCONTINUED | OUTPATIENT
Start: 2019-09-13 | End: 2019-09-15

## 2019-09-13 RX ORDER — ATORVASTATIN CALCIUM 20 MG/1
20 TABLET, FILM COATED ORAL NIGHTLY
Status: DISCONTINUED | OUTPATIENT
Start: 2019-09-13 | End: 2019-09-15

## 2019-09-13 RX ORDER — ONDANSETRON 2 MG/ML
4 INJECTION INTRAMUSCULAR; INTRAVENOUS EVERY 6 HOURS PRN
Status: DISCONTINUED | OUTPATIENT
Start: 2019-09-13 | End: 2019-09-15

## 2019-09-13 RX ORDER — ENOXAPARIN SODIUM 100 MG/ML
40 INJECTION SUBCUTANEOUS DAILY
Status: DISCONTINUED | OUTPATIENT
Start: 2019-09-13 | End: 2019-09-15

## 2019-09-13 RX ORDER — LIDOCAINE HYDROCHLORIDE 20 MG/ML
JELLY TOPICAL
Status: COMPLETED
Start: 2019-09-13 | End: 2019-09-13

## 2019-09-13 RX ORDER — ALFUZOSIN HYDROCHLORIDE 10 MG/1
10 TABLET, EXTENDED RELEASE ORAL
Status: DISCONTINUED | OUTPATIENT
Start: 2019-09-14 | End: 2019-09-15

## 2019-09-13 RX ORDER — FAMOTIDINE 20 MG/1
20 TABLET ORAL 2 TIMES DAILY
Status: DISCONTINUED | OUTPATIENT
Start: 2019-09-13 | End: 2019-09-15

## 2019-09-13 RX ORDER — TRAZODONE HYDROCHLORIDE 100 MG/1
100 TABLET ORAL NIGHTLY
Status: DISCONTINUED | OUTPATIENT
Start: 2019-09-13 | End: 2019-09-15

## 2019-09-13 RX ORDER — MELATONIN
325
Status: DISCONTINUED | OUTPATIENT
Start: 2019-09-14 | End: 2019-09-15

## 2019-09-13 RX ORDER — MELATONIN
325
Status: DISCONTINUED | OUTPATIENT
Start: 2019-09-14 | End: 2019-09-13

## 2019-09-13 RX ORDER — METOCLOPRAMIDE HYDROCHLORIDE 5 MG/ML
10 INJECTION INTRAMUSCULAR; INTRAVENOUS EVERY 8 HOURS PRN
Status: DISCONTINUED | OUTPATIENT
Start: 2019-09-13 | End: 2019-09-15

## 2019-09-13 RX ORDER — DOCUSATE SODIUM 100 MG/1
100 CAPSULE, LIQUID FILLED ORAL 2 TIMES DAILY PRN
Status: DISCONTINUED | OUTPATIENT
Start: 2019-09-13 | End: 2019-09-15

## 2019-09-13 RX ORDER — SODIUM CHLORIDE 9 MG/ML
INJECTION, SOLUTION INTRAVENOUS CONTINUOUS
Status: DISCONTINUED | OUTPATIENT
Start: 2019-09-13 | End: 2019-09-15

## 2019-09-13 RX ORDER — BACLOFEN 10 MG/1
10 TABLET ORAL ONCE
Status: COMPLETED | OUTPATIENT
Start: 2019-09-13 | End: 2019-09-13

## 2019-09-13 RX ORDER — ACETAMINOPHEN 325 MG/1
650 TABLET ORAL EVERY 6 HOURS PRN
Status: DISCONTINUED | OUTPATIENT
Start: 2019-09-13 | End: 2019-09-15

## 2019-09-13 RX ORDER — TRAMADOL HYDROCHLORIDE 50 MG/1
50 TABLET ORAL EVERY 6 HOURS PRN
Status: DISCONTINUED | OUTPATIENT
Start: 2019-09-13 | End: 2019-09-15

## 2019-09-13 RX ORDER — MORPHINE SULFATE 4 MG/ML
4 INJECTION, SOLUTION INTRAMUSCULAR; INTRAVENOUS ONCE
Status: COMPLETED | OUTPATIENT
Start: 2019-09-13 | End: 2019-09-13

## 2019-09-13 RX ORDER — AMLODIPINE BESYLATE 5 MG/1
5 TABLET ORAL DAILY
Status: DISCONTINUED | OUTPATIENT
Start: 2019-09-14 | End: 2019-09-15

## 2019-09-13 NOTE — TELEPHONE ENCOUNTER
Received notification from [facility] with plan of care. Faxed signed order. Received fax confirmation.      [phone]

## 2019-09-13 NOTE — ED NOTES
Hicks catheter insertion attempted. Met with great resistance. Unable to advance hicks. Patient asking stop. MD notified.

## 2019-09-13 NOTE — ED INITIAL ASSESSMENT (HPI)
Per EMS pts urine is cloudy and dark, pt c/o pain when he feels like he has to pee   Pt has hicks in place         Pt has hx of stroke

## 2019-09-13 NOTE — TELEPHONE ENCOUNTER
Recommended patient call urologist.  Patient is on Vantin for recent UTI. Suspicious catheter may have moved and needs to be adjusted. Please inform.

## 2019-09-13 NOTE — TELEPHONE ENCOUNTER
Alisson Castro, patients daughter calling to speak to nurse. Alisson Castro states patient is screaming when urinating and that the catheter has pus on it. Please call back.      [phone]

## 2019-09-13 NOTE — TELEPHONE ENCOUNTER
Spoke with Angely, per HIPAA, to inform doctor recommendations. Angely states that she already spoke with urologist office today and got nowhere with them.  Angely states she already called an ambulance to have father taken to [facility] since he is in a lot of pa

## 2019-09-13 NOTE — ED PROVIDER NOTES
Patient Seen in: [facility] Emergency Department    History   Patient presents with:  Urinary Symptoms (urologic)    Stated Complaint: cloudy urine     HPI    CHIEF COMPLAINT: Possible urinary tract infection with an indwelling Cook catheter    HIS 75   Temp 97.9 °F (36.6 °C) (Temporal)   Resp 17   Ht 167.6 cm (5' 6\")   Wt 70.8 kg   SpO2 95%   BMI 25.18 kg/m²         Physical Exam    Nursing notes and vital signs reviewed     General Appearance: alert and oriented x 4, no acute distress  Eyes:  pupi RAINBOW DRAW GOLD   Patient's laboratory work-up revealed unremarkable CBC and CMP.   1445: Case discussed with Dr. Debra Chong, urology, inform the patient's history and physical.  He states that this catheter will need to be placed with a scope and he will do

## 2019-09-14 ENCOUNTER — ANESTHESIA EVENT (OUTPATIENT)
Dept: SURGERY | Facility: HOSPITAL | Age: 69
End: 2019-09-14

## 2019-09-14 ENCOUNTER — ANESTHESIA (OUTPATIENT)
Dept: SURGERY | Facility: HOSPITAL | Age: 69
End: 2019-09-14

## 2019-09-14 LAB
ANION GAP SERPL CALC-SCNC: 7 MMOL/L (ref 0–18)
BUN BLD-MCNC: 9 MG/DL (ref 7–18)
BUN/CREAT SERPL: 16.1 (ref 10–20)
CALCIUM BLD-MCNC: 8.8 MG/DL (ref 8.5–10.1)
CHLORIDE SERPL-SCNC: 103 MMOL/L (ref 98–112)
CO2 SERPL-SCNC: 28 MMOL/L (ref 21–32)
CREAT BLD-MCNC: 0.56 MG/DL (ref 0.7–1.3)
GLUCOSE BLD-MCNC: 109 MG/DL (ref 70–99)
GLUCOSE BLD-MCNC: 114 MG/DL (ref 70–99)
GLUCOSE BLD-MCNC: 115 MG/DL (ref 70–99)
GLUCOSE BLD-MCNC: 123 MG/DL (ref 70–99)
GLUCOSE BLD-MCNC: 134 MG/DL (ref 70–99)
GLUCOSE BLD-MCNC: 216 MG/DL (ref 70–99)
OSMOLALITY SERPL CALC.SUM OF ELEC: 285 MOSM/KG (ref 275–295)
POTASSIUM SERPL-SCNC: 3.6 MMOL/L (ref 3.5–5.1)
SODIUM SERPL-SCNC: 138 MMOL/L (ref 136–145)

## 2019-09-14 PROCEDURE — 0T9B70Z DRAINAGE OF BLADDER WITH DRAINAGE DEVICE, VIA NATURAL OR ARTIFICIAL OPENING: ICD-10-PCS | Performed by: UROLOGY

## 2019-09-14 PROCEDURE — 0T7D8ZZ DILATION OF URETHRA, VIA NATURAL OR ARTIFICIAL OPENING ENDOSCOPIC: ICD-10-PCS | Performed by: UROLOGY

## 2019-09-14 PROCEDURE — 0TCB8ZZ EXTIRPATION OF MATTER FROM BLADDER, VIA NATURAL OR ARTIFICIAL OPENING ENDOSCOPIC: ICD-10-PCS | Performed by: UROLOGY

## 2019-09-14 RX ORDER — ONDANSETRON 4 MG/1
4 TABLET, FILM COATED ORAL EVERY 6 HOURS PRN
Status: DISCONTINUED | OUTPATIENT
Start: 2019-09-14 | End: 2019-09-15

## 2019-09-14 RX ORDER — SODIUM CHLORIDE, SODIUM LACTATE, POTASSIUM CHLORIDE, CALCIUM CHLORIDE 600; 310; 30; 20 MG/100ML; MG/100ML; MG/100ML; MG/100ML
INJECTION, SOLUTION INTRAVENOUS CONTINUOUS
Status: DISCONTINUED | OUTPATIENT
Start: 2019-09-14 | End: 2019-09-15

## 2019-09-14 RX ORDER — HYDROMORPHONE HYDROCHLORIDE 1 MG/ML
0.4 INJECTION, SOLUTION INTRAMUSCULAR; INTRAVENOUS; SUBCUTANEOUS EVERY 5 MIN PRN
Status: DISCONTINUED | OUTPATIENT
Start: 2019-09-14 | End: 2019-09-14 | Stop reason: HOSPADM

## 2019-09-14 RX ORDER — DOCUSATE SODIUM 100 MG/1
100 CAPSULE, LIQUID FILLED ORAL 2 TIMES DAILY
Status: DISCONTINUED | OUTPATIENT
Start: 2019-09-14 | End: 2019-09-15

## 2019-09-14 RX ORDER — DEXTROSE MONOHYDRATE 25 G/50ML
50 INJECTION, SOLUTION INTRAVENOUS
Status: DISCONTINUED | OUTPATIENT
Start: 2019-09-14 | End: 2019-09-14 | Stop reason: HOSPADM

## 2019-09-14 RX ORDER — LIDOCAINE HYDROCHLORIDE 20 MG/ML
JELLY TOPICAL AS NEEDED
Status: DISCONTINUED | OUTPATIENT
Start: 2019-09-14 | End: 2019-09-14 | Stop reason: HOSPADM

## 2019-09-14 RX ORDER — CEFAZOLIN SODIUM/WATER 2 G/20 ML
2 SYRINGE (ML) INTRAVENOUS EVERY 8 HOURS
Status: COMPLETED | OUTPATIENT
Start: 2019-09-14 | End: 2019-09-15

## 2019-09-14 RX ORDER — ACETAMINOPHEN 325 MG/1
650 TABLET ORAL EVERY 6 HOURS
Status: DISCONTINUED | OUTPATIENT
Start: 2019-09-14 | End: 2019-09-15

## 2019-09-14 RX ORDER — HYDROCODONE BITARTRATE AND ACETAMINOPHEN 5; 325 MG/1; MG/1
1 TABLET ORAL EVERY 6 HOURS PRN
Status: DISCONTINUED | OUTPATIENT
Start: 2019-09-14 | End: 2019-09-15

## 2019-09-14 RX ORDER — CEFAZOLIN SODIUM/WATER 2 G/20 ML
2 SYRINGE (ML) INTRAVENOUS
Status: DISCONTINUED | OUTPATIENT
Start: 2019-09-14 | End: 2019-09-14

## 2019-09-14 RX ORDER — POTASSIUM CHLORIDE 20 MEQ/1
40 TABLET, EXTENDED RELEASE ORAL EVERY 4 HOURS
Status: COMPLETED | OUTPATIENT
Start: 2019-09-14 | End: 2019-09-14

## 2019-09-14 RX ORDER — MAGNESIUM HYDROXIDE 1200 MG/15ML
3000 LIQUID ORAL CONTINUOUS
Status: DISCONTINUED | OUTPATIENT
Start: 2019-09-14 | End: 2019-09-15

## 2019-09-14 RX ORDER — CEFAZOLIN SODIUM 1 G/3ML
INJECTION, POWDER, FOR SOLUTION INTRAMUSCULAR; INTRAVENOUS
Status: DISCONTINUED | OUTPATIENT
Start: 2019-09-14 | End: 2019-09-14 | Stop reason: HOSPADM

## 2019-09-14 RX ORDER — NALOXONE HYDROCHLORIDE 0.4 MG/ML
80 INJECTION, SOLUTION INTRAMUSCULAR; INTRAVENOUS; SUBCUTANEOUS AS NEEDED
Status: DISCONTINUED | OUTPATIENT
Start: 2019-09-14 | End: 2019-09-14 | Stop reason: HOSPADM

## 2019-09-14 RX ORDER — ONDANSETRON 2 MG/ML
4 INJECTION INTRAMUSCULAR; INTRAVENOUS EVERY 6 HOURS PRN
Status: DISCONTINUED | OUTPATIENT
Start: 2019-09-14 | End: 2019-09-15

## 2019-09-14 RX ORDER — SODIUM CHLORIDE, SODIUM LACTATE, POTASSIUM CHLORIDE, CALCIUM CHLORIDE 600; 310; 30; 20 MG/100ML; MG/100ML; MG/100ML; MG/100ML
INJECTION, SOLUTION INTRAVENOUS CONTINUOUS
Status: DISCONTINUED | OUTPATIENT
Start: 2019-09-14 | End: 2019-09-14 | Stop reason: HOSPADM

## 2019-09-14 NOTE — PROGRESS NOTES
[facility]  Progress Note     Arvell Rai Patient Status:  Observation    1950 MRN [id number]   [facility] 3NE-A Attending Rahul Bustillos MD   Hosp Day # 0 PCP Ming Hyatt DO     Chief Complaint: urinary retention    S: P Oral Daily   • atorvastatin  20 mg Oral Nightly   • baclofen  10 mg Oral BID   • traZODone HCl  100 mg Oral Nightly   • Alfuzosin HCl ER  10 mg Oral Daily with breakfast   • Sertraline HCl  200 mg Oral Daily   • gabapentin  300 mg Oral BID   • famoTIDine

## 2019-09-14 NOTE — PLAN OF CARE
NURSING ADMISSION NOTE      Patient admitted via cart from ER to [address] [place] [address]  Admitted for possible UTI  Hx of stroke with left sided weakness/paralysis. Alert to self and situation, follows commands. Incontinent, voided in the diaper.   Recd order and ca anxiety  - Utilize distraction and/or relaxation techniques  - Monitor for opioid side effects  - Notify MD/LIP if interventions unsuccessful or patient reports new pain  - Anticipate increased pain with activity and pre-medicate as appropriate  Outcome: P Monitor intake/output and perform bladder scan as needed  - Follow urinary retention protocol/standard of care  - Consider collaborating with pharmacy to review patient's medication profile  - Implement strategies to promote bladder emptying  Outcome: Prog

## 2019-09-14 NOTE — SLP NOTE
ADULT SWALLOWING EVALUATION    ASSESSMENT    ASSESSMENT/OVERALL IMPRESSION:  Pt seen for BSSE. Pt not seen by our service previously. Pt admitted for urinary retention. Pt on altered consistency diet at home per the pt's daughter.  Pt on chopped and thin li ACE   • Anxiety    • Atherosclerotic heart disease of native coronary artery without angina pectoris    • Cholecystitis 2/17/2015   • Cognitive communication deficit    • Coronary artery disease involving coronary bypass graft of native heart with unstable Status: Unlabored  Consistencies Trialed: Hard solid; Thin liquids  Method of Presentation: Staff/Clinician assistance;Spoon;Cup;Single sips  Patient Positioning: Upright    Oral Phase of Swallow:  Within Functional Limits                      Pharyngeal Pha

## 2019-09-14 NOTE — PACU NOTE
2 rings belonging to patient placed in plastic bag by OR staff and taped to inside of chart; PACU RN witness.

## 2019-09-14 NOTE — PROGRESS NOTES
Patient returned from post op with 3-way catheter CBI. Urine is light pink in color. 200 ml in hicks bag. 0.9 continuous input. Patient alert & oriented. Family at the bedside. No complaint of pain. Patient ordered clear liquid tray.

## 2019-09-14 NOTE — SLP NOTE
Order received for BSSE. Pt has not been seen by our service previously. Pt with a hx/o dysphagia and is on an altered consistency diet. Pt is currently NPO for a Cystoscopy for this am. Unable to complete swallow eval at this time.  Will reattempt swallow

## 2019-09-14 NOTE — BRIEF OP NOTE
Pre-Operative Diagnosis: Hematuria [R31.9], urinary retention     Post-Operative Diagnosis:Hematuria [R31.9], urinary retention     Procedure Performed:   Procedure(s):  CYSTOSCOPY, URETHRAL DILATATION, CLOT EVACUATION AND COMPLEX CAMPBELL PLACEMENT     Surge

## 2019-09-14 NOTE — ANESTHESIA POSTPROCEDURE EVALUATION
[address] [place] [address] Patient Status:  Observation   Age/Gender 68year old male MRN [id number]   [facility] SURGERY Attending Hafsa Kc MD   [address] Day # 0 PCP Sabine Iverson DO       Anesthesia Post-op Note    Procedure(s):  C

## 2019-09-14 NOTE — H&P
[facility]  History and Physical     Israel Liliana Patient Status:  Observation    1950 MRN [id number]   [facility] 3NE-A Attending Kevin Schmitt MD   Hosp Day # 0 PCP Lucas Lawson DO     Chief Complaint: Cloudy urine hemisphere, subcortical [facility])    • Personal history of malignant neoplasm of prostate    • Platelet dysfunction due to drugs 3/2/2019   • Problems with swallowing    • Stroke [facility])    • TIA (transient ischemic attack) 8/25/2016   • Tracheostomy status ([facility] Transdermal Gel Apply 1 Application topically 4 (four) times daily as needed. Disp:  Rfl:    Nitrofurantoin Monohyd Macro 100 MG Oral Cap Take 100 mg by mouth 2 (two) times daily.  Started 8/11/2019 Disp:  Rfl: 0   docusate sodium 100 MG Oral Tab Take 1 tab aspirin 81 MG Oral Tab EC Take 1 tablet (81 mg total) by mouth daily. Disp: 90 tablet Rfl: 2   Cyanocobalamin 1000 MCG Oral Cap Take 1,000 capsules by mouth 2 (two) times daily.  Disp: 180 capsule Rfl: 0   Diclofenac Sodium 1 % Transdermal Gel Apply 1 g t MG Oral Tab Take 1 tablet (1 mg total) by mouth nightly as needed (insomnia). Disp: 90 tablet Rfl: 0   Cholecalciferol (VITAMIN D) 1000 units Oral Tab Take 1 tablet by mouth daily.  Disp: 90 tablet Rfl: 1   Insulin Degludec (TRESIBA FLEXTOUCH) 200 UNIT/ML S the last 168 hours. No results for input(s): TROP, CK in the last 168 hours. Imaging: Imaging data reviewed in Epic. ASSESSMENT / PLAN:     1. Urinary obstruction, BPH, ?urethral stricture with chronic hicks  1. Removed in the ER  2.  Inability t

## 2019-09-14 NOTE — CONSULTS
[facility]    Report of Consultation    Beto Hooper Patient Status:  Observation    1950 MRN [id number]   [facility] 3NE-A Attending Dorian Chaves MD   Hosp Day # 0 PCP Baldemar Molina DO     Reason for Consultation:  Urinary r [facility])    • Hypo-osmolality and hyponatremia    • Hyponatremia 3/15/2017   • Idiopathic angioedema 5/5/2017   • Iliac artery aneurysm, left ([address] 8/25/2016    [place] repair at [facility] 2/22/2017   • Incontinence    • Nontraumatic intracerebral atorvastatin (LIPITOR) tab 20 mg, 20 mg, Oral, Nightly  •  baclofen (LIORESAL) tab 10 mg, 10 mg, Oral, BID  •  traZODone HCl (DESYREL) tab 100 mg, 100 mg, Oral, Nightly  •  traMADol HCl (ULTRAM) tab 50 mg, 50 mg, Oral, Q6H PRN  •  Alfuzosin HCl ER (Tenna Mini (Axillary)   Resp 17   Ht 167.6 cm (5' 6\")   Wt 156 lb (70.8 kg)   SpO2 93%   BMI 25.18 kg/m²   General appearance: alert, NAD, resting in bed  Head: Normocephalic, without obvious abnormality, atraumatic  Eyes: conjunctivae and sclerae normal  Ears: norm

## 2019-09-14 NOTE — ANESTHESIA PREPROCEDURE EVALUATION
PRE-OP EVALUATION    Patient Name: Scotty Kocher    Pre-op Diagnosis: Hematuria [R31.9]    Procedure(s):  CYSTOSCOPY,  URETEROSCOPY, RETROGRADE, PYELOGRAM, BILATERAL     Surgeon(s) and Role:     Sanam Villanueva MD - Primary    Pre-op vitals reviewed.   Shannan Rolon 650 mg 650 mg Oral Q6H PRN   ondansetron HCl (ZOFRAN) injection 4 mg 4 mg Intravenous Q6H PRN   Metoclopramide HCl (REGLAN) injection 10 mg 10 mg Intravenous Q8H PRN   ferrous sulfate EC tab 325 mg 325 mg Oral Daily with breakfast       Outpatient Medicati 750 MG Oral Tablet 24 Hr Take 1 tablet (750 mg total) by mouth 2 (two) times daily with meals. Disp: 180 tablet Rfl: 1   atorvastatin 20 MG Oral Tab Take 1 tablet (20 mg total) by mouth nightly.  Disp: 90 tablet Rfl: 1   baclofen 10 MG Oral Tab Take 1 table GUIDE) In Vitro Strip Test 3 x daily Disp: 100 strip Rfl: 11   ACCU-CHEK FASTCLIX LANCETS Does not apply Misc 3 x daily Disp: 1 Box Rfl: 11   Lancets Does not apply Misc Inject 1 applicator as directed 3 (three) times daily.  Disp:  Rfl:        Allergies: L Component Value Date     09/14/2019    K 3.6 09/14/2019    K 3.7 07/02/2019     09/14/2019    CL 98 07/02/2019    CO2 28.0 09/14/2019    CO2 29 07/02/2019    BUN 9 09/14/2019    BUN 7 07/02/2019    CREATSERUM 0.56 (L) 09/14/2019     (H

## 2019-09-15 VITALS
DIASTOLIC BLOOD PRESSURE: 56 MMHG | RESPIRATION RATE: 19 BRPM | HEART RATE: 80 BPM | OXYGEN SATURATION: 96 % | BODY MASS INDEX: 25.07 KG/M2 | SYSTOLIC BLOOD PRESSURE: 113 MMHG | TEMPERATURE: 99 F | WEIGHT: 156 LBS | HEIGHT: 66 IN

## 2019-09-15 LAB
BILIRUB UR QL STRIP.AUTO: NEGATIVE
CLARITY UR REFRACT.AUTO: CLEAR
COLOR UR AUTO: YELLOW
GLUCOSE BLD-MCNC: 159 MG/DL (ref 70–99)
GLUCOSE BLD-MCNC: 254 MG/DL (ref 70–99)
GLUCOSE UR STRIP.AUTO-MCNC: 50 MG/DL
KETONES UR STRIP.AUTO-MCNC: NEGATIVE MG/DL
NITRITE UR QL STRIP.AUTO: NEGATIVE
PH UR STRIP.AUTO: 6 [PH] (ref 4.5–8)
POTASSIUM SERPL-SCNC: 4.4 MMOL/L (ref 3.5–5.1)
PROT UR STRIP.AUTO-MCNC: NEGATIVE MG/DL
RBC #/AREA URNS AUTO: >10 /HPF
SP GR UR STRIP.AUTO: 1.01 (ref 1–1.03)
UROBILINOGEN UR STRIP.AUTO-MCNC: <2 MG/DL

## 2019-09-15 PROCEDURE — 99225 SUBSEQUENT OBSERVATION CARE: CPT | Performed by: HOSPITALIST

## 2019-09-15 RX ORDER — CEPHALEXIN 500 MG/1
500 CAPSULE ORAL 3 TIMES DAILY
Qty: 21 CAPSULE | Refills: 0 | Status: SHIPPED | OUTPATIENT
Start: 2019-09-15 | End: 2019-10-21 | Stop reason: ALTCHOICE

## 2019-09-15 NOTE — PROGRESS NOTES
[facility]  Progress Note     Tg Guerini Patient Status:  Observation    1950 MRN [id number]   [facility] 3NE-A Attending Radha Loyola MD   Hosp Day # 0 PCP Surya Landa DO     Chief Complaint: urinary retention    S: Candi Grover Epic.    Medications:   • acetaminophen  650 mg Oral Q6H   • docusate sodium  100 mg Oral BID   • amLODIPine Besylate  5 mg Oral Daily   • aspirin  81 mg Oral Daily   • atorvastatin  20 mg Oral Nightly   • baclofen  10 mg Oral BID   • traZODone HCl  100 mg

## 2019-09-15 NOTE — PLAN OF CARE
A/O x 3. Indian and [place] speaking. Daughter at bedside through night  L weakness residual from stroke  Room air.   Vitals Q 4. Tele not ordered  CBI continuous irrigation going slow- urine is clear, yellow.  No clots seen  Accucheck QID- BC running h on pain and pain management  - Manage/alleviate anxiety  - Utilize distraction and/or relaxation techniques  - Monitor for opioid side effects  - Notify MD/LIP if interventions unsuccessful or patient reports new pain  - Anticipate increased pain with acti patients to use assistive/communication devices  Outcome: Progressing     Problem: Impaired Swallowing  Goal: Minimize aspiration risk  Description  Interventions:  - Patient should be alert and upright for all feedings (90 degrees preferred)  - Offer food

## 2019-09-15 NOTE — PLAN OF CARE
NURSING DISCHARGE NOTE    Discharged Home via Ambulance. Accompanied by Support staff  Belongings Taken by patient/family. Paperwork and script given to ambulance staff. Daughter updated on discharge instructions. Cook in place upon dc.

## 2019-09-15 NOTE — DISCHARGE SUMMARY
[facility] HOSPITALIST  DISCHARGE SUMMARY     Tamanna Patel Patient Status:  Observation    1950 MRN [id number]   [facility] 3NE-A Attending Chapis Ramesh MD   Hosp Day # 0 PCP Heydi Mays DO     Date of Admission: 2019  Date of Barbie Torres Instructions Prescription details   ACCU-CHEK GUIDE Strp      Test 3 x daily   Quantity:  100 strip  Refills:  11     ACCU-CHEK GUIDE w/Device Kit      USE TO TEST BS TID   Refills:  0     Acetaminophen 500 MG Caps      Take 1 capsule (500 mg total) b Quantity:  90 tablet  Refills:  3     FREESTYLE DEIDRA 14 DAY SENSOR Misc      1 Units by Does not apply route every 14 (fourteen) days.    Quantity:  2 each  Refills:  11     FREESTYLE DEIDRA READER Laura      Dispense 1 Freestyle reader for Chauncey Mitchell total) by mouth 2 (two) times daily with meals. Quantity:  180 tablet  Refills:  1     Nitrofurantoin Monohyd Macro 100 MG Caps  Commonly known as:  MACROBID      Take 100 mg by mouth 2 (two) times daily.  Started 8/11/2019   Refills:  0     Ondansetron [address]nbsp; Clinic is on the first floor, elevator to the second floor for physical therapy office. 9/23/2019  2:15 PM  APN/MD DIABETIC FOLLOW UP [6847] 45 min.  [address] Esequiel Corona

## 2019-09-15 NOTE — CM/SW NOTE
Pt is ready for d/c today. Pt will go home with continued [facility] services through [facility]. [facility] [phone] to notify thm of pt's d/c. AVS sent via ecin. They will see call pt when he gets home.   Family wants pt to be transported

## 2019-09-15 NOTE — OPERATIVE REPORT
[facility]    PATIENT'S NAME: Yolis Eric   ATTENDING PHYSICIAN: Gómez Prabhakar M.D. OPERATING PHYSICIAN: Yaquelin Tejada M.D.    PATIENT ACCOUNT#:   [de-identified]    LOCATION:  [address] [place] [address]  MEDICAL RECORD #:   [id number]       DATE OF BIRTH:  [birth year] bladder neck into the bladder. The clots were then evacuated with bladder irrigation. There was no bleeding that required fulguration.   The cystoscope was then withdrawn and a 20-Lao 3-way Cook catheter was then placed over the Glidewire and advanced

## 2019-09-16 ENCOUNTER — OFFICE VISIT (OUTPATIENT)
Dept: UROLOGY | Age: 69
End: 2019-09-16

## 2019-09-16 ENCOUNTER — TELEPHONE (OUTPATIENT)
Dept: FAMILY MEDICINE CLINIC | Facility: CLINIC | Age: 69
End: 2019-09-16

## 2019-09-16 VITALS — BODY MASS INDEX: 25.07 KG/M2 | HEIGHT: 66 IN | TEMPERATURE: 99.6 F | WEIGHT: 156 LBS

## 2019-09-16 DIAGNOSIS — N32.0 BLADDER NECK CONTRACTURE: ICD-10-CM

## 2019-09-16 DIAGNOSIS — R33.9 URINARY RETENTION: Primary | ICD-10-CM

## 2019-09-16 PROCEDURE — 99213 OFFICE O/P EST LOW 20 MIN: CPT | Performed by: NURSE PRACTITIONER

## 2019-09-16 RX ORDER — TRAZODONE HYDROCHLORIDE 100 MG/1
200 TABLET ORAL NIGHTLY
Refills: 99 | COMMUNITY
Start: 2019-06-27 | End: 2020-08-12 | Stop reason: SINTOL

## 2019-09-16 RX ORDER — AMLODIPINE BESYLATE 5 MG/1
5 TABLET ORAL DAILY
COMMUNITY
Start: 2020-06-29

## 2019-09-16 RX ORDER — BACLOFEN 10 MG/1
10 TABLET ORAL 2 TIMES DAILY
COMMUNITY
Start: 2020-06-29

## 2019-09-16 RX ORDER — CLOPIDOGREL BISULFATE 75 MG/1
75 TABLET ORAL DAILY
COMMUNITY
Start: 2020-06-29

## 2019-09-16 NOTE — TELEPHONE ENCOUNTER
Janay from [facility], wants to speak to a nurse to who can verbally confirm to Her that Dr. Vira Lesch is pts PCP and if she will sign off any forms from [address] [place] [address]. Please call janay back to [phone].

## 2019-09-17 ENCOUNTER — TELEPHONE (OUTPATIENT)
Dept: FAMILY MEDICINE CLINIC | Facility: CLINIC | Age: 69
End: 2019-09-17

## 2019-09-17 ENCOUNTER — PATIENT OUTREACH (OUTPATIENT)
Dept: CASE MANAGEMENT | Age: 69
End: 2019-09-17

## 2019-09-17 ENCOUNTER — TELEPHONE (OUTPATIENT)
Dept: UROLOGY | Age: 69
End: 2019-09-17

## 2019-09-17 DIAGNOSIS — Z02.9 ENCOUNTERS FOR UNSPECIFIED ADMINISTRATIVE PURPOSE: ICD-10-CM

## 2019-09-17 NOTE — PROGRESS NOTES
Initial Post Discharge Follow Up   Discharge Date: 9/15/19  Contact Date: 9/17/2019    Consent Verification:  Assessment Completed With: Other: Miller Biswas dtr Permission received per patient?  written  HIPAA Verified?   Yes       Discharge Dx:     Hematuria dtr stated the pt started the antibiotic and that all th rest are the same. Med rec to be completed at [facility] appt. Current Outpatient Medications:  cephALEXin 500 MG Oral Cap Take 1 capsule (500 mg total) by mouth 3 (three) times daily.  Disp: 21 capsule 90 tablet Rfl: 1   tamsulosin HCl 0.4 MG Oral Cap Take 1 capsule (0.4 mg total) by mouth daily. Disp: 90 capsule Rfl: 1   metFORMIN HCl  MG Oral Tablet 24 Hr Take 1 tablet (750 mg total) by mouth 2 (two) times daily with meals.  Disp: 180 tablet Rfl: SENSOR) Does not apply Misc 1 Units by Does not apply route every 14 (fourteen) days.  Disp: 2 each Rfl: 11   Continuous Blood Gluc  (FREESTYLE DEIDRA READER) Does not apply Device Dispense 1 Freestyle reader for Deidra CGM Disp: 1 Device Rfl: 0   Ins dtr stated the would like to FU on the order placed prior to this admission regarding equipment for at home. Dtr stated the pt is to have a bed, walker and commode at home. TE will be sent to FU.       Follow up appointments:  Dtr stated the pt saw the urol instructions reviewed with the pt's dtr. Reviewed when to call MD vs when to call 911 or go the ED. Dtr verbalized understanding and will contact the office with any further questions or concerns.      TE sent re: HFU appt and to FU on home medical supplies

## 2019-09-17 NOTE — TELEPHONE ENCOUNTER
Called Anai/ Suhail again regarding faxed DME orders. Last fax was on 9/9/2019.    Awa Vernon gave new fax for [facility] office [phone]  Fax confrimed

## 2019-09-17 NOTE — TELEPHONE ENCOUNTER
Pt was contacted for TCM, s/w dtr tyrone Perkins. Pt currently does not have his HFU appt scheduled. Attempted to schedule an appt for the pt with his dtr but she stated she would have to call back at a later time when she has her calendar with her.  TCM/H

## 2019-09-20 ENCOUNTER — TELEPHONE (OUTPATIENT)
Dept: FAMILY MEDICINE CLINIC | Facility: CLINIC | Age: 69
End: 2019-09-20

## 2019-09-20 DIAGNOSIS — G81.94 ACUTE LEFT HEMIPARESIS (HCC): Primary | ICD-10-CM

## 2019-09-20 NOTE — TELEPHONE ENCOUNTER
DME equipment has not arrived yet.    Kelly Brody believes a full body katiuska lift with sling may be needed in the [facility]       patient lift hydraulic or mechanical includes any seat sling or strap pad  OK per Dr Padilla Late

## 2019-09-20 NOTE — TELEPHONE ENCOUNTER
Spoke with pooja office. They are reviewing  And will put authorization in to insurance to make sure it will be processed.  Should proceed quickly from here

## 2019-09-20 NOTE — TELEPHONE ENCOUNTER
Called Anai/Suhail TE 9/17/2019    Per Anai. A new order needs to be placed for [facility] lift.  They cannot add it to previous orders    Referral pended for MD signature

## 2019-09-21 ENCOUNTER — HOSPITAL ENCOUNTER (EMERGENCY)
Facility: HOSPITAL | Age: 69
Discharge: HOME | End: 2019-09-21
Attending: EMERGENCY MEDICINE
Payer: MEDICARE

## 2019-09-21 VITALS
WEIGHT: 154.31 LBS | OXYGEN SATURATION: 99 % | BODY MASS INDEX: 25 KG/M2 | HEART RATE: 78 BPM | TEMPERATURE: 98 F | DIASTOLIC BLOOD PRESSURE: 76 MMHG | RESPIRATION RATE: 14 BRPM | SYSTOLIC BLOOD PRESSURE: 128 MMHG

## 2019-09-21 DIAGNOSIS — N39.0 URINARY TRACT INFECTION ASSOCIATED WITH INDWELLING URETHRAL CATHETER, SUBSEQUENT ENCOUNTER: Primary | ICD-10-CM

## 2019-09-21 DIAGNOSIS — R39.89 PAIN IN URETHRA: ICD-10-CM

## 2019-09-21 DIAGNOSIS — T83.511D URINARY TRACT INFECTION ASSOCIATED WITH INDWELLING URETHRAL CATHETER, SUBSEQUENT ENCOUNTER: Primary | ICD-10-CM

## 2019-09-21 LAB
ALBUMIN SERPL-MCNC: 3.8 G/DL (ref 3.4–5)
ALBUMIN/GLOB SERPL: 1 {RATIO} (ref 1–2)
ALP LIVER SERPL-CCNC: 98 U/L (ref 45–117)
ALT SERPL-CCNC: 29 U/L (ref 16–61)
ANION GAP SERPL CALC-SCNC: 8 MMOL/L (ref 0–18)
AST SERPL-CCNC: 37 U/L (ref 15–37)
BASOPHILS # BLD AUTO: 0.02 X10(3) UL (ref 0–0.2)
BASOPHILS NFR BLD AUTO: 0.2 %
BILIRUB SERPL-MCNC: 0.8 MG/DL (ref 0.1–2)
BILIRUB UR QL STRIP.AUTO: NEGATIVE
BUN BLD-MCNC: 16 MG/DL (ref 7–18)
BUN/CREAT SERPL: 19.5 (ref 10–20)
CALCIUM BLD-MCNC: 9.4 MG/DL (ref 8.5–10.1)
CHLORIDE SERPL-SCNC: 101 MMOL/L (ref 98–112)
CO2 SERPL-SCNC: 27 MMOL/L (ref 21–32)
COLOR UR AUTO: YELLOW
CREAT BLD-MCNC: 0.82 MG/DL (ref 0.7–1.3)
DEPRECATED RDW RBC AUTO: 48.2 FL (ref 35.1–46.3)
EOSINOPHIL # BLD AUTO: 0.04 X10(3) UL (ref 0–0.7)
EOSINOPHIL NFR BLD AUTO: 0.4 %
ERYTHROCYTE [DISTWIDTH] IN BLOOD BY AUTOMATED COUNT: 15.8 % (ref 11–15)
GLOBULIN PLAS-MCNC: 3.8 G/DL (ref 2.8–4.4)
GLUCOSE BLD-MCNC: 156 MG/DL (ref 70–99)
GLUCOSE UR STRIP.AUTO-MCNC: NEGATIVE MG/DL
HCT VFR BLD AUTO: 39.4 % (ref 39–53)
HGB BLD-MCNC: 12.8 G/DL (ref 13–17.5)
IMM GRANULOCYTES # BLD AUTO: 0.08 X10(3) UL (ref 0–1)
IMM GRANULOCYTES NFR BLD: 0.9 %
LYMPHOCYTES # BLD AUTO: 1.41 X10(3) UL (ref 1–4)
LYMPHOCYTES NFR BLD AUTO: 15.4 %
M PROTEIN MFR SERPL ELPH: 7.6 G/DL (ref 6.4–8.2)
MCH RBC QN AUTO: 27.3 PG (ref 26–34)
MCHC RBC AUTO-ENTMCNC: 32.5 G/DL (ref 31–37)
MCV RBC AUTO: 84 FL (ref 80–100)
MONOCYTES # BLD AUTO: 0.69 X10(3) UL (ref 0.1–1)
MONOCYTES NFR BLD AUTO: 7.5 %
NEUTROPHILS # BLD AUTO: 6.91 X10 (3) UL (ref 1.5–7.7)
NEUTROPHILS # BLD AUTO: 6.91 X10(3) UL (ref 1.5–7.7)
NEUTROPHILS NFR BLD AUTO: 75.6 %
NITRITE UR QL STRIP.AUTO: NEGATIVE
OSMOLALITY SERPL CALC.SUM OF ELEC: 286 MOSM/KG (ref 275–295)
PH UR STRIP.AUTO: 5 [PH] (ref 4.5–8)
PLATELET # BLD AUTO: 216 10(3)UL (ref 150–450)
POTASSIUM SERPL-SCNC: 3.7 MMOL/L (ref 3.5–5.1)
PROT UR STRIP.AUTO-MCNC: 100 MG/DL
RBC # BLD AUTO: 4.69 X10(6)UL (ref 3.8–5.8)
RBC #/AREA URNS AUTO: >10 /HPF
SODIUM SERPL-SCNC: 136 MMOL/L (ref 136–145)
SP GR UR STRIP.AUTO: 1.03 (ref 1–1.03)
UROBILINOGEN UR STRIP.AUTO-MCNC: 2 MG/DL
WBC # BLD AUTO: 9.2 X10(3) UL (ref 4–11)
WBC #/AREA URNS AUTO: >50 /HPF

## 2019-09-21 PROCEDURE — 87086 URINE CULTURE/COLONY COUNT: CPT | Performed by: EMERGENCY MEDICINE

## 2019-09-21 PROCEDURE — 80053 COMPREHEN METABOLIC PANEL: CPT | Performed by: EMERGENCY MEDICINE

## 2019-09-21 PROCEDURE — 99285 EMERGENCY DEPT VISIT HI MDM: CPT

## 2019-09-21 PROCEDURE — 99284 EMERGENCY DEPT VISIT MOD MDM: CPT

## 2019-09-21 PROCEDURE — 96375 TX/PRO/DX INJ NEW DRUG ADDON: CPT

## 2019-09-21 PROCEDURE — 96376 TX/PRO/DX INJ SAME DRUG ADON: CPT

## 2019-09-21 PROCEDURE — 96361 HYDRATE IV INFUSION ADD-ON: CPT

## 2019-09-21 PROCEDURE — 85025 COMPLETE CBC W/AUTO DIFF WBC: CPT | Performed by: EMERGENCY MEDICINE

## 2019-09-21 PROCEDURE — 81001 URINALYSIS AUTO W/SCOPE: CPT | Performed by: EMERGENCY MEDICINE

## 2019-09-21 PROCEDURE — 96365 THER/PROPH/DIAG IV INF INIT: CPT

## 2019-09-21 PROCEDURE — 51702 INSERT TEMP BLADDER CATH: CPT

## 2019-09-21 RX ORDER — HYDROCODONE BITARTRATE AND ACETAMINOPHEN 5; 325 MG/1; MG/1
1 TABLET ORAL EVERY 6 HOURS PRN
Qty: 10 TABLET | Refills: 0 | Status: SHIPPED | OUTPATIENT
Start: 2019-09-21 | End: 2019-09-28

## 2019-09-21 RX ORDER — DOCUSATE SODIUM 100 MG/1
100 CAPSULE, LIQUID FILLED ORAL 2 TIMES DAILY
Qty: 30 CAPSULE | Refills: 0 | Status: SHIPPED | OUTPATIENT
Start: 2019-09-21 | End: 2019-10-21

## 2019-09-21 RX ORDER — TRAMADOL HYDROCHLORIDE 50 MG/1
50 TABLET ORAL EVERY 6 HOURS PRN
Qty: 10 TABLET | Refills: 0 | Status: SHIPPED | OUTPATIENT
Start: 2019-09-21 | End: 2019-09-21 | Stop reason: CLARIF

## 2019-09-21 RX ORDER — MORPHINE SULFATE 4 MG/ML
4 INJECTION, SOLUTION INTRAMUSCULAR; INTRAVENOUS ONCE
Status: COMPLETED | OUTPATIENT
Start: 2019-09-21 | End: 2019-09-21

## 2019-09-21 RX ORDER — LIDOCAINE HYDROCHLORIDE 20 MG/ML
10 JELLY TOPICAL ONCE
Status: DISCONTINUED | OUTPATIENT
Start: 2019-09-21 | End: 2019-09-22

## 2019-09-21 RX ORDER — LIDOCAINE HYDROCHLORIDE 20 MG/ML
10 JELLY TOPICAL ONCE
Status: COMPLETED | OUTPATIENT
Start: 2019-09-21 | End: 2019-09-21

## 2019-09-21 RX ORDER — PHENAZOPYRIDINE HYDROCHLORIDE 200 MG/1
200 TABLET, FILM COATED ORAL 3 TIMES DAILY PRN
Qty: 5 TABLET | Refills: 0 | Status: SHIPPED | OUTPATIENT
Start: 2019-09-21 | End: 2019-09-27 | Stop reason: ALTCHOICE

## 2019-09-21 RX ORDER — HYDROCODONE BITARTRATE AND ACETAMINOPHEN 5; 325 MG/1; MG/1
1 TABLET ORAL ONCE
Status: COMPLETED | OUTPATIENT
Start: 2019-09-21 | End: 2019-09-21

## 2019-09-21 RX ORDER — PHENAZOPYRIDINE HYDROCHLORIDE 200 MG/1
200 TABLET, FILM COATED ORAL ONCE
Status: COMPLETED | OUTPATIENT
Start: 2019-09-21 | End: 2019-09-21

## 2019-09-21 RX ORDER — LIDOCAINE HYDROCHLORIDE 20 MG/ML
10 JELLY TOPICAL ONCE
Status: DISCONTINUED | OUTPATIENT
Start: 2019-09-21 | End: 2019-09-21

## 2019-09-21 NOTE — ED INITIAL ASSESSMENT (HPI)
Per EMS, daughter reports patient is being treated for UTI with keflex initially then changed to cipro. Patient has a urinary catheter in place. Reports increased burning with urination.

## 2019-09-21 NOTE — ED PROVIDER NOTES
Patient Seen in: [facility] Emergency Department      History   Patient presents with:  Urinary Symptoms (urologic)    Stated Complaint: UTI    HPI    Patient presents with UTI and persistent pain.   The patient was admitted from the 13th to the 15th Elma/Raj 2/22/2017   • Incontinence    • Nontraumatic intracerebral hemorrhage in hemisphere, subcortical [facility])    • Personal history of malignant neoplasm of prostate    • Platelet dysfunction due to drugs 3/2/2019   • Problems with swallowing    • Exam    General: Awake and alert, in no acute distress. HEENT: Normocephalic, atraumatic, pupils equal round and reactive to light, oropharynx clear. Neck: Supple. Cardiovascular: Regular rate and rhythm. Respiratory: Lungs clear to auscultation.   Delbra Merle (0 mL Intravenous Stopped 9/21/19 2245)   morphINE sulfate (PF) 4 MG/ML injection 4 mg (4 mg Intravenous Given 9/21/19 2020)   Cefepime HCl (MAXIPIME) 1 g in sodium chloride 0.9% 100 mL MBP/add-vantage (0 g Intravenous Stopped 9/21/19 2124)   Phenazopyridi List as of 9/21/2019 10:47 PM    START taking these medications    Phenazopyridine HCl 200 MG Oral Tab  Take 1 tablet (200 mg total) by mouth 3 (three) times daily as needed for Pain., Normal, Disp-5 tablet, R-0    docusate sodium 100 MG Oral Cap  Take 1 c

## 2019-09-24 ENCOUNTER — HOSPITAL ENCOUNTER (EMERGENCY)
Facility: HOSPITAL | Age: 69
Discharge: HOME | End: 2019-09-24
Attending: EMERGENCY MEDICINE
Payer: MEDICARE

## 2019-09-24 ENCOUNTER — APPOINTMENT (OUTPATIENT)
Dept: CT IMAGING | Facility: HOSPITAL | Age: 69
End: 2019-09-24
Attending: EMERGENCY MEDICINE
Payer: MEDICARE

## 2019-09-24 VITALS
TEMPERATURE: 98 F | HEART RATE: 71 BPM | RESPIRATION RATE: 18 BRPM | SYSTOLIC BLOOD PRESSURE: 124 MMHG | DIASTOLIC BLOOD PRESSURE: 71 MMHG | OXYGEN SATURATION: 96 %

## 2019-09-24 DIAGNOSIS — R10.9 ABDOMINAL PAIN OF UNKNOWN ETIOLOGY: ICD-10-CM

## 2019-09-24 DIAGNOSIS — R33.9 URINARY RETENTION: Primary | ICD-10-CM

## 2019-09-24 LAB
ALBUMIN SERPL-MCNC: 3.5 G/DL (ref 3.4–5)
ALBUMIN/GLOB SERPL: 1 {RATIO} (ref 1–2)
ALP LIVER SERPL-CCNC: 86 U/L (ref 45–117)
ALT SERPL-CCNC: 30 U/L (ref 16–61)
ANION GAP SERPL CALC-SCNC: 5 MMOL/L (ref 0–18)
AST SERPL-CCNC: 26 U/L (ref 15–37)
BASOPHILS # BLD AUTO: 0.02 X10(3) UL (ref 0–0.2)
BASOPHILS NFR BLD AUTO: 0.4 %
BILIRUB SERPL-MCNC: 0.5 MG/DL (ref 0.1–2)
BUN BLD-MCNC: 16 MG/DL (ref 7–18)
BUN/CREAT SERPL: 22.9 (ref 10–20)
CALCIUM BLD-MCNC: 9.3 MG/DL (ref 8.5–10.1)
CHLORIDE SERPL-SCNC: 103 MMOL/L (ref 98–112)
CLARITY UR REFRACT.AUTO: CLEAR
CO2 SERPL-SCNC: 29 MMOL/L (ref 21–32)
COLOR UR AUTO: YELLOW
CREAT BLD-MCNC: 0.7 MG/DL (ref 0.7–1.3)
DEPRECATED RDW RBC AUTO: 49.7 FL (ref 35.1–46.3)
EOSINOPHIL # BLD AUTO: 0.07 X10(3) UL (ref 0–0.7)
EOSINOPHIL NFR BLD AUTO: 1.3 %
ERYTHROCYTE [DISTWIDTH] IN BLOOD BY AUTOMATED COUNT: 16 % (ref 11–15)
GLOBULIN PLAS-MCNC: 3.6 G/DL (ref 2.8–4.4)
GLUCOSE BLD-MCNC: 101 MG/DL (ref 70–99)
GLUCOSE UR STRIP.AUTO-MCNC: NEGATIVE MG/DL
HCT VFR BLD AUTO: 36.8 % (ref 39–53)
HGB BLD-MCNC: 12 G/DL (ref 13–17.5)
IMM GRANULOCYTES # BLD AUTO: 0.05 X10(3) UL (ref 0–1)
IMM GRANULOCYTES NFR BLD: 0.9 %
KETONES UR STRIP.AUTO-MCNC: NEGATIVE MG/DL
LEUKOCYTE ESTERASE UR QL STRIP.AUTO: NEGATIVE
LIPASE SERPL-CCNC: 72 U/L (ref 73–393)
LYMPHOCYTES # BLD AUTO: 0.91 X10(3) UL (ref 1–4)
LYMPHOCYTES NFR BLD AUTO: 16.3 %
M PROTEIN MFR SERPL ELPH: 7.1 G/DL (ref 6.4–8.2)
MCH RBC QN AUTO: 27.7 PG (ref 26–34)
MCHC RBC AUTO-ENTMCNC: 32.6 G/DL (ref 31–37)
MCV RBC AUTO: 85 FL (ref 80–100)
MONOCYTES # BLD AUTO: 0.4 X10(3) UL (ref 0.1–1)
MONOCYTES NFR BLD AUTO: 7.2 %
NEUTROPHILS # BLD AUTO: 4.13 X10 (3) UL (ref 1.5–7.7)
NEUTROPHILS # BLD AUTO: 4.13 X10(3) UL (ref 1.5–7.7)
NEUTROPHILS NFR BLD AUTO: 73.9 %
NITRITE UR QL STRIP.AUTO: NEGATIVE
OSMOLALITY SERPL CALC.SUM OF ELEC: 285 MOSM/KG (ref 275–295)
PH UR STRIP.AUTO: 6.5 [PH] (ref 4.5–8)
PLATELET # BLD AUTO: 202 10(3)UL (ref 150–450)
POTASSIUM SERPL-SCNC: 3.7 MMOL/L (ref 3.5–5.1)
PROT UR STRIP.AUTO-MCNC: >=300 MG/DL
RBC # BLD AUTO: 4.33 X10(6)UL (ref 3.8–5.8)
SODIUM SERPL-SCNC: 137 MMOL/L (ref 136–145)
SP GR UR STRIP.AUTO: >=1.03 (ref 1–1.03)
UROBILINOGEN UR STRIP.AUTO-MCNC: 0.2 MG/DL
WBC # BLD AUTO: 5.6 X10(3) UL (ref 4–11)

## 2019-09-24 PROCEDURE — 96374 THER/PROPH/DIAG INJ IV PUSH: CPT

## 2019-09-24 PROCEDURE — 99285 EMERGENCY DEPT VISIT HI MDM: CPT

## 2019-09-24 PROCEDURE — 51702 INSERT TEMP BLADDER CATH: CPT

## 2019-09-24 PROCEDURE — 96361 HYDRATE IV INFUSION ADD-ON: CPT

## 2019-09-24 PROCEDURE — 81001 URINALYSIS AUTO W/SCOPE: CPT | Performed by: EMERGENCY MEDICINE

## 2019-09-24 PROCEDURE — 85025 COMPLETE CBC W/AUTO DIFF WBC: CPT | Performed by: EMERGENCY MEDICINE

## 2019-09-24 PROCEDURE — 74176 CT ABD & PELVIS W/O CONTRAST: CPT | Performed by: EMERGENCY MEDICINE

## 2019-09-24 PROCEDURE — 80053 COMPREHEN METABOLIC PANEL: CPT | Performed by: EMERGENCY MEDICINE

## 2019-09-24 PROCEDURE — 83690 ASSAY OF LIPASE: CPT | Performed by: EMERGENCY MEDICINE

## 2019-09-24 PROCEDURE — 96372 THER/PROPH/DIAG INJ SC/IM: CPT

## 2019-09-24 RX ORDER — LIDOCAINE HYDROCHLORIDE 20 MG/ML
10 JELLY TOPICAL ONCE
Status: DISCONTINUED | OUTPATIENT
Start: 2019-09-24 | End: 2019-09-24

## 2019-09-24 RX ORDER — LIDOCAINE HYDROCHLORIDE 20 MG/ML
10 JELLY TOPICAL ONCE
Status: COMPLETED | OUTPATIENT
Start: 2019-09-24 | End: 2019-09-24

## 2019-09-24 RX ORDER — LEVOFLOXACIN 250 MG/1
250 TABLET ORAL DAILY
Qty: 7 TABLET | Refills: 0 | Status: SHIPPED | OUTPATIENT
Start: 2019-09-24 | End: 2019-10-01

## 2019-09-24 RX ORDER — DICYCLOMINE HYDROCHLORIDE 10 MG/ML
20 INJECTION INTRAMUSCULAR ONCE
Status: COMPLETED | OUTPATIENT
Start: 2019-09-24 | End: 2019-09-24

## 2019-09-24 RX ORDER — MORPHINE SULFATE 4 MG/ML
4 INJECTION, SOLUTION INTRAMUSCULAR; INTRAVENOUS ONCE
Status: COMPLETED | OUTPATIENT
Start: 2019-09-24 | End: 2019-09-24

## 2019-09-24 RX ORDER — LEVOFLOXACIN 750 MG/1
750 TABLET ORAL ONCE
Status: COMPLETED | OUTPATIENT
Start: 2019-09-24 | End: 2019-09-24

## 2019-09-24 RX ORDER — ONDANSETRON 2 MG/ML
4 INJECTION INTRAMUSCULAR; INTRAVENOUS ONCE
Status: DISCONTINUED | OUTPATIENT
Start: 2019-09-24 | End: 2019-09-24

## 2019-09-24 RX ORDER — OXYBUTYNIN CHLORIDE 5 MG/1
5 TABLET ORAL 2 TIMES DAILY
Qty: 180 TABLET | Refills: 3 | Status: SHIPPED | OUTPATIENT
Start: 2019-09-24

## 2019-09-24 NOTE — ED NOTES
Per Dr Ramírez Pineda, RN made an attempt to reposition hicks catheter. RN noticed urine leaking from around the catheter. Linen and diaper change completed with PCT. Dr Ramirez Breeding notified.

## 2019-09-24 NOTE — ED PROVIDER NOTES
Patient Seen in: [facility] Emergency Department      History   Patient presents with:  Urinary Symptoms (urologic)  Abdomen/Flank Pain (GI/)    Stated Complaint: painful urination,diarrhea    HPI    This is a 75-year-old male who arrives here wit Idiopathic angioedema 5/5/2017   • Iliac artery aneurysm, left ([address] 8/25/2016    [place] repair at [facility]/Raj 2/22/2017   • Incontinence    • Nontraumatic intracerebral hemorrhage in hemisphere, subcortical [facility])    • Personal history of malign Device 09/24/19 1314 None (Room air)       Current:/71   Pulse 71   Temp 97.7 °F (36.5 °C) (Temporal)   Resp 18   SpO2 96%         Physical Exam  General: The patient is in no respiratory distress oral mucosa is wet lips do look slightly dry  The pat Lymphocyte Absolute 0.91 (*)     All other components within normal limits   CBC WITH DIFFERENTIAL WITH PLATELET    Narrative: The following orders were created for panel order CBC WITH DIFFERENTIAL WITH PLATELET.   Procedure of cholecystectomy are noted. PANCREAS:  No lesion, fluid collection, ductal dilatation, or atrophy. SPLEEN:  No enlargement or focal lesion. KIDNEYS:  No evidence of renal stones or hydronephrosis. ADRENALS:  No mass or enlargement.   AORTA/VASCULAR: and nontender. The patient was given Levaquin. Patient will be discharged home with close follow-up. I reexamined his abdomen soft he has no complaints of family is comfortable with this. He will be go back to nursing home.   Repeat abdominal exam is co

## 2019-09-24 NOTE — ED INITIAL ASSESSMENT (HPI)
Pt from home with a hicks , here sat with same c/o, painful urination, on abx for uti, diarrhea started today

## 2019-09-25 ENCOUNTER — TELEPHONE (OUTPATIENT)
Dept: FAMILY MEDICINE CLINIC | Facility: CLINIC | Age: 69
End: 2019-09-25

## 2019-09-25 NOTE — ED NOTES
Continued assessment of current hicks catheter revealed it is not draining. Dr Russel Ackerman was notified who ordered hicks removed. Plan of care was discussed with the family and they were updated on what to expect for the rest of their ED visit.

## 2019-09-25 NOTE — ED NOTES
[facility] ambulance contacted per family request to transport patient home. ETA 30 min.

## 2019-09-25 NOTE — ED NOTES
Current indwelling catheter removed per Dr Pedro Zamarripa. Dr Pedro Zamarripa placed another hicks catheter with coude tip initially draining red fluid with sediment noted. Secured in place. Will continue to assess if hicks is draining properly.

## 2019-09-25 NOTE — TELEPHONE ENCOUNTER
[facility] faxed  Medical necessity order forms for    leg rest elevating pair  [facility] back cushion    [address] [facility] cushion   wheelchair Fer   Wheelchair commode    Orders signed and fax returned.

## 2019-09-25 NOTE — CONSULTS
Urology Initial Evaluation:   Encounter Date: 9/24/2019  Referring Physician: No ref. provider found    Chief Complaint:   Urinary Retention after CVA, Difficult Cook Catheter Placement    History of Present Illness:    Luz Marina Sims is a 68year old male • Coronary artery disease involving coronary bypass graft of native heart with unstable angina pectoris ([facility] 1/28/2015   • Cytotoxic cerebral edema ([facility] 3/2/2019   • Difficulty in walking, not elsewhere classified    • Dysphagia, oropharyngeal phase    • Phenazopyridine HCl 200 MG Oral Tab Take 1 tablet (200 mg total) by mouth 3 (three) times daily as needed for Pain. Disp: 5 tablet Rfl: 0   docusate sodium 100 MG Oral Cap Take 1 capsule (100 mg total) by mouth 2 (two) times daily.  Disp: 30 capsule Rfl: 0 ipratropium-albuterol 0.5-2.5 (3) MG/3ML Inhalation Solution Take 3 mL by nebulization 4 (four) times daily as needed. Disp: 60 mL Rfl: 1   amLODIPine Besylate 5 MG Oral Tab Take 1 tablet (5 mg total) by mouth daily.  Disp: 90 tablet Rfl: 1   Ferrous Sulfat Blood Glucose Monitoring Suppl (ACCU-CHEK GUIDE) w/Device Does not apply Kit USE TO TEST BS TID Disp:  Rfl: 0   Insulin Lispro (HUMALOG KWIKPEN) 200 UNIT/ML Subcutaneous Solution Pen-injector Inject 12- 25 units daily as directed before meals based on BG r GI: Denies abdominal pain, nausea, vomiting, or changes in bowel movements. : See HPI. Physical Examination:   Blood pressure 124/71, pulse 71, temperature 97.7 °F (36.5 °C), temperature source Temporal, resp. rate 18, SpO2 96 %.   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 He presents today with urinary leakage around his hicks, as well as discomfort.   CT reveals that the hicks catheter is malpositioned, with the balloon within the pr

## 2019-09-25 NOTE — CONSULTS
[facility] Pharmacy Note:  Renal Adjustment for levofloxacin (Arelis Cohn)    Jovanni Brooks is a 68year old male who has been prescribed levofloxacin (LEVAQUIN) 250 mg once. CrCl is estimated creatinine clearance is 87.3 mL/min (based on SCr of 0.7 mg/dL).  so the

## 2019-09-26 ENCOUNTER — OFFICE VISIT (OUTPATIENT)
Dept: FAMILY MEDICINE CLINIC | Facility: CLINIC | Age: 69
End: 2019-09-26
Payer: MEDICARE

## 2019-09-26 VITALS
HEART RATE: 65 BPM | SYSTOLIC BLOOD PRESSURE: 98 MMHG | DIASTOLIC BLOOD PRESSURE: 56 MMHG | TEMPERATURE: 98 F | OXYGEN SATURATION: 98 %

## 2019-09-26 DIAGNOSIS — I10 ESSENTIAL HYPERTENSION, BENIGN: ICD-10-CM

## 2019-09-26 DIAGNOSIS — R19.7 DIARRHEA, UNSPECIFIED TYPE: ICD-10-CM

## 2019-09-26 DIAGNOSIS — T83.511A URINARY TRACT INFECTION ASSOCIATED WITH INDWELLING URETHRAL CATHETER, INITIAL ENCOUNTER (HCC): ICD-10-CM

## 2019-09-26 DIAGNOSIS — N40.1 URINARY RETENTION DUE TO BENIGN PROSTATIC HYPERPLASIA: Primary | ICD-10-CM

## 2019-09-26 DIAGNOSIS — N39.0 URINARY TRACT INFECTION ASSOCIATED WITH INDWELLING URETHRAL CATHETER, INITIAL ENCOUNTER (HCC): ICD-10-CM

## 2019-09-26 DIAGNOSIS — R33.8 URINARY RETENTION DUE TO BENIGN PROSTATIC HYPERPLASIA: Primary | ICD-10-CM

## 2019-09-26 DIAGNOSIS — G47.09 OTHER INSOMNIA: ICD-10-CM

## 2019-09-26 PROCEDURE — 99495 TRANSJ CARE MGMT MOD F2F 14D: CPT | Performed by: FAMILY MEDICINE

## 2019-09-26 RX ORDER — OXYBUTYNIN CHLORIDE 5 MG/1
TABLET ORAL
COMMUNITY
Start: 2019-09-24 | End: 2019-10-15

## 2019-09-26 NOTE — PATIENT INSTRUCTIONS
As of October 6th 2014, the Drug Enforcement Agency [facility]) is reclassifying all hydrocodone combination medications from Schedule III to Schedule II. This includes medications such as Norco, Vicodin, Lortab, Zohydro, and Vicoprofen.      What this means for the bladder. A catheter-linked urinary tract infection (CAUTI) is an infection of the urinary tract. CAUTI is caused by bacteria that enter the urinary tract when a urinary catheter is used. This is a tube that’s placed into the bladder to drain urine. may include bladder infection, prostate infection, and kidney infection. A CAUTI can keep you in the hospital longer. If the infection is not treated in time, you may have serious health complications. What are the symptoms of a CAUTI?   · A burning feelin she then puts on sterile gloves. A sterile catheter kit that has cleansers is used to cleanse the your genital area. · Before doing catheter care, caregivers also wash their hands or use an alcohol-based hand cleanser.   · Hang the bag lower than your blad Always follow your healthcare professional's instructions. Diarrhea with Uncertain Cause (Adult)    Diarrhea is when stools are loose and watery.  This can be caused by:  · Viral infections  · Bacterial infections  · Food poisoning  · Parasites  · Ir and fever. Don’t use these if you have chronic liver or kidney disease, or ever had a stomach ulcer or gastrointestinal bleeding.  Don't use NSAID medicines if you are already taking one for another condition (like arthritis) or are on daily aspirin therapy fatty, greasy, spicy, or fried foods. · Don’t eat dairy foods or drink milk if you have diarrhea. These can make diarrhea worse.   During the first 24 hours you can try:  · Oral rehydration solutions.  Sports drinks may be used if you are not too dehydrate urine or no urine for 8 hours, dry mouth and tongue, tiredness, weakness, or dizziness  · Drowsiness  · New rash  · You don’t get better in 2 to 3 days  · Fever of 100.4°F (38°C) or higher, or as directed by your healthcare provider  Date Last Reviewed: 6/

## 2019-09-30 ENCOUNTER — APPOINTMENT (OUTPATIENT)
Dept: CT IMAGING | Facility: HOSPITAL | Age: 69
End: 2019-09-30
Payer: MEDICARE

## 2019-09-30 ENCOUNTER — HOSPITAL ENCOUNTER (EMERGENCY)
Facility: HOSPITAL | Age: 69
Discharge: HOME | End: 2019-10-01
Payer: MEDICARE

## 2019-09-30 ENCOUNTER — OFFICE VISIT (OUTPATIENT)
Dept: NEUROLOGY | Facility: CLINIC | Age: 69
End: 2019-09-30
Payer: MEDICARE

## 2019-09-30 ENCOUNTER — TELEPHONE (OUTPATIENT)
Dept: FAMILY MEDICINE CLINIC | Facility: CLINIC | Age: 69
End: 2019-09-30

## 2019-09-30 VITALS
BODY MASS INDEX: 25 KG/M2 | SYSTOLIC BLOOD PRESSURE: 112 MMHG | WEIGHT: 156 LBS | RESPIRATION RATE: 16 BRPM | HEART RATE: 80 BPM | DIASTOLIC BLOOD PRESSURE: 66 MMHG

## 2019-09-30 DIAGNOSIS — G57.93 NEUROPATHY INVOLVING BOTH LOWER EXTREMITIES: ICD-10-CM

## 2019-09-30 DIAGNOSIS — I61.0 NONTRAUMATIC SUBCORTICAL HEMORRHAGE OF RIGHT CEREBRAL HEMISPHERE (HCC): ICD-10-CM

## 2019-09-30 DIAGNOSIS — F32.1 CURRENT MODERATE EPISODE OF MAJOR DEPRESSIVE DISORDER WITHOUT PRIOR EPISODE (HCC): ICD-10-CM

## 2019-09-30 DIAGNOSIS — N48.89 PAIN, PENILE: Primary | ICD-10-CM

## 2019-09-30 DIAGNOSIS — I69.354 HEMIPARESIS AFFECTING LEFT SIDE AS LATE EFFECT OF STROKE (HCC): Primary | ICD-10-CM

## 2019-09-30 DIAGNOSIS — S00.83XA CONTUSION OF FACE, INITIAL ENCOUNTER: ICD-10-CM

## 2019-09-30 DIAGNOSIS — G81.94 LEFT HEMIPARESIS (HCC): ICD-10-CM

## 2019-09-30 DIAGNOSIS — Z97.8 INDWELLING FOLEY CATHETER PRESENT: ICD-10-CM

## 2019-09-30 DIAGNOSIS — S09.90XA INJURY OF HEAD, INITIAL ENCOUNTER: ICD-10-CM

## 2019-09-30 DIAGNOSIS — F51.01 PRIMARY INSOMNIA: ICD-10-CM

## 2019-09-30 PROCEDURE — 70450 CT HEAD/BRAIN W/O DYE: CPT

## 2019-09-30 PROCEDURE — 99284 EMERGENCY DEPT VISIT MOD MDM: CPT

## 2019-09-30 PROCEDURE — 87086 URINE CULTURE/COLONY COUNT: CPT

## 2019-09-30 PROCEDURE — 74176 CT ABD & PELVIS W/O CONTRAST: CPT

## 2019-09-30 PROCEDURE — 99205 OFFICE O/P NEW HI 60 MIN: CPT | Performed by: OTHER

## 2019-09-30 PROCEDURE — 96374 THER/PROPH/DIAG INJ IV PUSH: CPT

## 2019-09-30 RX ORDER — HYDROMORPHONE HYDROCHLORIDE 1 MG/ML
1 INJECTION, SOLUTION INTRAMUSCULAR; INTRAVENOUS; SUBCUTANEOUS ONCE
Status: COMPLETED | OUTPATIENT
Start: 2019-09-30 | End: 2019-09-30

## 2019-09-30 RX ORDER — CIPROFLOXACIN 500 MG/1
500 TABLET, FILM COATED ORAL 2 TIMES DAILY
COMMUNITY
End: 2019-10-15

## 2019-09-30 RX ORDER — PHENAZOPYRIDINE HYDROCHLORIDE 200 MG/1
200 TABLET, FILM COATED ORAL 3 TIMES DAILY PRN
COMMUNITY
End: 2019-10-29

## 2019-09-30 NOTE — TELEPHONE ENCOUNTER
Received home health notifications from [facility] for patient plan of care and patient compliance visits cancelled for Drs visits. Faxed signed orders for patient. Received fax confirmation.

## 2019-09-30 NOTE — PROGRESS NOTES
[address]   Neurology; INITIAL CLINIC VISIT  2019, 11:01 AM     Tg Linton Patient Status:  No patient class for patient encounter    1950 MRN [id number]   Location [address] [place] [address] Amarjit Spaulding heart with unstable angina pectoris [facility] 1/28/2015   • Cytotoxic cerebral edema ([facility] 3/2/2019   • Difficulty in walking, not elsewhere classified    • Dysphagia, oropharyngeal phase    • Esophageal reflux    • Former tobacco use 8/15/2019   • Gastrostomy used smokeless tobacco. He reports that he does not drink alcohol or use drugs.     ALLERGIES:    Lisinopril              SWELLING, ANGIOEDEMA    Comment:Severe reaction/hospitalized  Radiology Contrast *    ANGIOEDEMA    Comment:Exacerbation of tongue/lip External Patch Place 2 patches onto the skin daily as needed (left  posterior shoulder, diclofenac gel superior shoulder).  Disp: 30 patch Rfl: 3   ipratropium-albuterol 0.5-2.5 (3) MG/3ML Inhalation Solution Take 3 mL by nebulization 4 (four) times daily a Disp: 6 mL Rfl: 1   Insulin Pen Needle 30G X 5 MM Does not apply Misc Use with insulin aspart as needed Disp: 100 each Rfl: 0   Blood Glucose Monitoring Suppl (ACCU-CHEK GUIDE) w/Device Does not apply Kit USE TO TEST BS TID Disp:  Rfl: 0   Insulin Lispro ( in  upper or lower extremities, L. Spastic hemiparesis with pian in arm and leg,   NEURO: see HPI;  PSYCHE: no symptoms of depression or anxiety  HEMATOLOGY:  denies bruising or excessive bleeding  ENDOCRINE: denies excessive thirst or urination; denies un light touch, temperature, vibration in L. Arm and leg, from stroke, normal proprioception;  DTRs; slightly brisk in L. Bicep, triceps, absent in  Legs, with legs muscle atrophy, in TA. Babinski sign is negative;   Coordination: Normal FTN in arms,   Gait: discussed in depth regarding diagnosis, prognosis, treatment. Side effect of medications were discussed in details. The patient and family were given ample opportunity to ask questions. All questions and concerns were addressed to satisfactory status.   The

## 2019-10-01 VITALS
SYSTOLIC BLOOD PRESSURE: 106 MMHG | DIASTOLIC BLOOD PRESSURE: 73 MMHG | TEMPERATURE: 99 F | BODY MASS INDEX: 23.12 KG/M2 | HEART RATE: 79 BPM | HEIGHT: 69 IN | RESPIRATION RATE: 16 BRPM | WEIGHT: 156.06 LBS | OXYGEN SATURATION: 95 %

## 2019-10-01 NOTE — TELEPHONE ENCOUNTER
Faxed signed order recd from [facility] 9/30/2019    Recd confirmation fax back from [facility] ([facility])  9/30/2019  Sent to ryan

## 2019-10-01 NOTE — ED NOTES
[address] Daughter POA would like to be called with updates.   Daughter taking the patient's belongings home with her

## 2019-10-01 NOTE — ED INITIAL ASSESSMENT (HPI)
PT with hicks catheter, was just seen in ER a few days ago for hicks catheter replacement. PT presents with leaking around hicks but still draining.

## 2019-10-01 NOTE — ED PROVIDER NOTES
Patient Seen in: [facility] Emergency Department      History   Patient presents with:  Cath Tube Problem (gastrointestinal, urinary, integumentary)    Stated Complaint:     HPI  Elderly Serbian speaking male again presents to the ER, for leaking ar affecting unspecified side ([facility]    • Hypo-osmolality and hyponatremia    • Hyponatremia 3/15/2017   • Idiopathic angioedema 5/5/2017   • Iliac artery aneurysm, left ([facility] 8/25/2016    [place] repair [facility] Wills/Raj 2/22/2017   • Incontinence    • Device None (Room air)       Current:/73   Pulse 79   Temp 98.5 °F (36.9 °C) (Temporal)   Resp 16   Ht 175.3 cm (5' 9\")   Wt 70.8 kg   SpO2 95%   BMI 23.05 kg/m²         Physical Exam    GENERAL APPEARANCE:     Well developed, well nourished, alert There is no new hemorrhage, mass lesion, or mass effect. Moderate low attenuation in the subcortical white matter and periventricular white matter consistent with small vessel ischemic change. SINUSES:           No sign of acute sinusitis.   MASTOIDS: visible mass, obstruction, or bowel wall thickening. The appendix is normal.  Moderate amount of stool throughout the colon can be seen in constipation. ABDOMINAL WALL:  No mass or hernia.   URINARY BLADDER:  There is a Cook balloon within the urinary cynthia noted. PANCREAS:  No lesion, fluid collection, ductal dilatation, or atrophy. SPLEEN:  No enlargement or focal lesion. KIDNEYS:  No evidence of renal stones or hydronephrosis. ADRENALS:  No mass or enlargement.   AORTA/VASCULAR:    Postsurgical changes of physician attending to the patient, I determined, within reasonable clinical confidence and prior to discharge, that an emergency medical condition was not or was no longer present.       The patient's Past Medical History and medication / allergy list have

## 2019-10-01 NOTE — ED NOTES
[facility] contacted per patient request for transport home.  ETA 15 min

## 2019-10-03 ENCOUNTER — TELEPHONE (OUTPATIENT)
Dept: FAMILY MEDICINE CLINIC | Facility: CLINIC | Age: 69
End: 2019-10-03

## 2019-10-03 DIAGNOSIS — M25.512 ACUTE PAIN OF LEFT SHOULDER: ICD-10-CM

## 2019-10-03 DIAGNOSIS — Z91.81 HISTORY OF FALL: Primary | ICD-10-CM

## 2019-10-03 NOTE — TELEPHONE ENCOUNTER
Pts daughter Jackie Velez who is on Lalito Fraction. Called stating that patient fell on 09/29/2019 and went to ED ER on 09/30/2019 and they did a CT scan and they stated that he was okay.  Jackie Velez is concerned because she is now seeing bruising on pts shoulder and elbow and silver

## 2019-10-04 ENCOUNTER — TELEPHONE (OUTPATIENT)
Dept: FAMILY MEDICINE CLINIC | Facility: CLINIC | Age: 69
End: 2019-10-04

## 2019-10-04 ENCOUNTER — HOSPITAL ENCOUNTER (OUTPATIENT)
Dept: GENERAL RADIOLOGY | Facility: HOSPITAL | Age: 69
Discharge: HOME | End: 2019-10-04
Attending: FAMILY MEDICINE
Payer: MEDICARE

## 2019-10-04 DIAGNOSIS — Z91.81 HISTORY OF FALL: ICD-10-CM

## 2019-10-04 DIAGNOSIS — M25.512 ACUTE PAIN OF LEFT SHOULDER: ICD-10-CM

## 2019-10-04 PROCEDURE — 73080 X-RAY EXAM OF ELBOW: CPT | Performed by: FAMILY MEDICINE

## 2019-10-04 PROCEDURE — 73030 X-RAY EXAM OF SHOULDER: CPT | Performed by: FAMILY MEDICINE

## 2019-10-04 PROCEDURE — 73070 X-RAY EXAM OF ELBOW: CPT | Performed by: FAMILY MEDICINE

## 2019-10-04 RX ORDER — MUSCLE RUB CREAM 100; 150 MG/G; MG/G
CREAM TOPICAL
COMMUNITY
Start: 2019-09-25 | End: 2019-10-15

## 2019-10-04 NOTE — TELEPHONE ENCOUNTER
Gorge Karma reports he is in a lot of pain in left side shoulder are and left elblow and does not want to move. They cannot lift his arm. Bruising started more yesterday. He showers Tuesday and Thursday. There was no bruising on Tuesday.   They are icing it and an

## 2019-10-04 NOTE — TELEPHONE ENCOUNTER
Received treatment and plan of care notifications from [facility]. Faxed signed orders. Received fax confirmation.

## 2019-10-04 NOTE — TELEPHONE ENCOUNTER
Xray left shoulder ordered. PT perfoming range of motion  On left shoulder/leg? Agree with advice given.

## 2019-10-04 NOTE — TELEPHONE ENCOUNTER
CT brain and abdomen, do not include the shoulder. A flat bruise is superficial. Pt is on plavix and more susceptible for bruising with injury or spontaneous. It will resolve on own.  If pain worsens then can have repeat xray but if no other problem with

## 2019-10-14 ENCOUNTER — TELEPHONE (OUTPATIENT)
Dept: UROLOGY | Age: 69
End: 2019-10-14

## 2019-10-14 ENCOUNTER — TELEPHONE (OUTPATIENT)
Dept: FAMILY MEDICINE CLINIC | Facility: CLINIC | Age: 69
End: 2019-10-14

## 2019-10-14 NOTE — TELEPHONE ENCOUNTER
Fely Calvin with [facility], is calling stating pt has an appt tomorrow with them, but they need a referral before they can see him. Please call Fely Calvin back at [phone] or [phone].

## 2019-10-14 NOTE — TELEPHONE ENCOUNTER
Spoke with Angely (LYNDA) states Betina Walters is scheduled for his Diabetic eye exam on 10/26/2019 at 2:30 PM.

## 2019-10-15 ENCOUNTER — OFFICE VISIT (OUTPATIENT)
Dept: NEUROLOGY | Facility: CLINIC | Age: 69
End: 2019-10-15
Payer: MEDICARE

## 2019-10-15 ENCOUNTER — APPOINTMENT (OUTPATIENT)
Dept: UROLOGY | Age: 69
End: 2019-10-15

## 2019-10-15 VITALS
RESPIRATION RATE: 20 BRPM | BODY MASS INDEX: 25.07 KG/M2 | WEIGHT: 156 LBS | DIASTOLIC BLOOD PRESSURE: 74 MMHG | SYSTOLIC BLOOD PRESSURE: 124 MMHG | HEART RATE: 88 BPM | HEIGHT: 66 IN

## 2019-10-15 DIAGNOSIS — G81.94 ACUTE LEFT HEMIPARESIS (HCC): ICD-10-CM

## 2019-10-15 DIAGNOSIS — S43.005A DISLOCATION OF LEFT SHOULDER JOINT, INITIAL ENCOUNTER: ICD-10-CM

## 2019-10-15 PROCEDURE — 99204 OFFICE O/P NEW MOD 45 MIN: CPT | Performed by: PHYSICAL MEDICINE & REHABILITATION

## 2019-10-15 RX ORDER — BACLOFEN 10 MG/1
10 TABLET ORAL 3 TIMES DAILY
Qty: 90 TABLET | Refills: 0 | Status: SHIPPED | OUTPATIENT
Start: 2019-10-15 | End: 2019-10-15

## 2019-10-15 NOTE — PATIENT INSTRUCTIONS
Recommend he be seen at [facility] for spasticity.  Increase the baclofen

## 2019-10-15 NOTE — H&P
Sonali Perez    History and Physical    Marlise Beer Patient Status:  No patient class for patient encounter    1950 MRN [id number]   [facility]  Attending No att. providers found   Hosp Day # 0 P 3/2/2019   • Difficulty in walking, not elsewhere classified    • Dysphagia, oropharyngeal phase    • Esophageal reflux    • Former tobacco use 8/15/2019   • [facility]    • Hammer toes of both feet 4/13/2018   • Hemiplegia and hemiparesis fo Smokeless tobacco: Never Used      Tobacco comment: quit 1976    Alcohol use: No    Drug use: No    Allergies/Medications:    Allergies:   Lisinopril              SWELLING, ANGIOEDEMA    Comment:Severe reaction/hospitalized  Radiology Contrast [place] 09/24/2019    HGB 12.0 (L) 09/24/2019    HCT 36.8 (L) 09/24/2019    .0 09/24/2019    CREATSERUM 0.70 09/24/2019    BUN 16 09/24/2019     09/24/2019    K 3.7 09/24/2019     09/24/2019    CO2 29.0 09/24/2019     (H) 09/24/2019    CA

## 2019-10-16 ENCOUNTER — TELEPHONE (OUTPATIENT)
Dept: FAMILY MEDICINE CLINIC | Facility: CLINIC | Age: 69
End: 2019-10-16

## 2019-10-16 NOTE — TELEPHONE ENCOUNTER
Spoke with daughter Angely. Reports Hu Adams is safe and not harming himself or others. Reports this has happened in the past. Phone number provided for existing referrals.   Reports she will follow up as needed

## 2019-10-16 NOTE — TELEPHONE ENCOUNTER
Given the acuity of his psych issues, he needs to be seen in the office to be properly evaluated before referral to psychiatry is considered appropriate.   If he is acting very agitated, irritable, or harming himself for others, then please call 911 or go t

## 2019-10-16 NOTE — TELEPHONE ENCOUNTER
Pts daughter Fabby Gonzales who is on FYI, called stating pt is starting to ask for his spouse who is no longer alive and pts daughter wants to know if they can get a referral for a psychiatrist and pt also needs a referral for the diabetes dr since the last one exp

## 2019-10-17 NOTE — TELEPHONE ENCOUNTER
Faustino Cox, reports psychiatrist is not taking new patient.  Needs new referral    Spoke with Coy Regalado on [place], they do not see age >57    Called [facility] and they are accepting patients and see over 62 yo  Dr Francine Simmons   Fax [phone]    However

## 2019-10-21 ENCOUNTER — OFFICE VISIT (OUTPATIENT)
Dept: ENDOCRINOLOGY CLINIC | Facility: CLINIC | Age: 69
End: 2019-10-21
Payer: MEDICARE

## 2019-10-21 ENCOUNTER — TELEPHONE (OUTPATIENT)
Dept: FAMILY MEDICINE CLINIC | Facility: CLINIC | Age: 69
End: 2019-10-21

## 2019-10-21 VITALS
SYSTOLIC BLOOD PRESSURE: 112 MMHG | RESPIRATION RATE: 20 BRPM | OXYGEN SATURATION: 98 % | HEIGHT: 66 IN | DIASTOLIC BLOOD PRESSURE: 62 MMHG | BODY MASS INDEX: 25 KG/M2 | HEART RATE: 93 BPM

## 2019-10-21 DIAGNOSIS — E11.8 TYPE 2 DIABETES WITH COMPLICATION (HCC): Primary | ICD-10-CM

## 2019-10-21 PROCEDURE — 83036 HEMOGLOBIN GLYCOSYLATED A1C: CPT | Performed by: NURSE PRACTITIONER

## 2019-10-21 PROCEDURE — 99214 OFFICE O/P EST MOD 30 MIN: CPT | Performed by: NURSE PRACTITIONER

## 2019-10-21 PROCEDURE — 82962 GLUCOSE BLOOD TEST: CPT | Performed by: NURSE PRACTITIONER

## 2019-10-21 NOTE — PROGRESS NOTES
Patient presents with:  Diabetes: room-16 cf. pt had stroke back in march      HPI:   Enriqueta Mata is a 68year old male presenting on stretcher via medical transport for type 2 DM medication management.  In the past 3 m his DM control has improved A1C 5.9% (last Dyes    Past Medical History:   Diagnosis Date   • AAA (abdominal aortic aneurysm) without rupture (HCC)    • Abnormal posture    • Acute pancreatitis 1/26/2015   • Acute respiratory failure ([facility] 3/2/2019   • Acute respiratory failure with hypoxia ([facility] PROSTATECTOMY, RETROPUBIC RADICAL, W/NERVE SPARING     • OTHER  2011    quad bipass   • OTHER  dec 2014, aug 2014    stents   • REPAIR, ENDOVASC, INFRARENAL ABDOM AORTIC ANEURYSM/DISSECT; AORTO-ILIAC/FEMORAL UNI PROSTHESIS  02/22/2017    left leg     Stalin Lopez Rfl: 1  Acetaminophen 500 MG Oral Cap, Take 1 capsule (500 mg total) by mouth every 6 (six) hours as needed for Pain (max use 6 tabs in 24 hours or 4000mg). , Disp: 90 capsule, Rfl: 0  aspirin 81 MG Oral Tab EC, Take 1 tablet (81 mg total) by mouth daily. , (eight) hours as needed for Nausea., Disp: 30 tablet, Rfl: 2  lidocaine 4 % External Patch, Place 2 patches onto the skin daily as needed (left  posterior shoulder, diclofenac gel superior shoulder). , Disp: 30 patch, Rfl: 3  ipratropium-albuterol 0.5-2.5 ( Edema not present. Pulmonary/Chest: Effort normal and breath sounds normal.   Neurological: He is alert and oriented to person, place, and time. slurred speech but appropriate response   Psychiatric: He has a normal mood and affect.  His behavior is no

## 2019-10-21 NOTE — PROGRESS NOTES
Patient presents with:  Diabetes: room-16 cf. pt had stroke back in march      HPI:   Arabella Arreola is a 68year old male presenting with type 2 DM medication management.  In the past 3 m his DM control has improved : a1c today 8.0%  ( last A1C 11.0%)     Pt is pr Neuropathy: yes  Retinopathy: no  Nephropathy: no- 11/2018      Macrovascular  PVD: yes  CAD: yes  Stroke/CVA: no but hx TIA      Modifying factors:  Testing 4 x daily   Following MDI dose adherence.        Modifying factors:  Medication adherence: yes mellitus without mention of complication, not stated as uncontrolled    • Unspecified essential hypertension    • Unstable angina ([facility] 4/22/2015   • Vertigo 1/26/2015     Past Surgical History:   Procedure Laterality Date   • ANGIOGRAM     • ANGIOPLASTY ( constipation (on iron). , Disp: 60 tablet, Rfl: 5  Clopidogrel Bisulfate 75 MG Oral Tab, Take 1 tablet (75 mg total) by mouth daily. , Disp: 90 tablet, Rfl: 1  Sertraline HCl 100 MG Oral Tab, Take 2 tablets (200 mg total) by mouth daily. , Disp: 180 tablet, R topically 4 (four) times daily.  Left shoulder for pain, Disp: 100 g, Rfl: 5  Insulin Aspart Pen 100 UNIT/ML Subcutaneous Solution Pen-injector, Sliding scale BS  -give 0units SC -200 give 4 units SC -250 give 7 units SC -300 give 9 leg swelling. Gastrointestinal: Negative for nausea, diarrhea and constipation. Endocrine: Negative for polydipsia, polyphagia and polyuria. Genitourinary: Negative for dysuria. Skin: Negative for wound. Neurological: Positive for numbness. take 22 units of insulin   If blood sugar is > 351, take 24 units of insulin   Continue SMBG 4 x daily   Reminded of hypoglycemia s/s and tx   Continue insulin site rotation   Due to long term Metformin use, recommended B 12 supplement 1000mcg daily   F/u

## 2019-10-21 NOTE — PATIENT INSTRUCTIONS
Your blood sugar trends are much better  He has lost weight and diet has improved.      Stay off Joe Loss  Continue Metformin  twice daily     Humalog  : only give when blood sugars are high     ONLY GIVE if blood sugar is over 250 : 2 hours after a me

## 2019-10-21 NOTE — TELEPHONE ENCOUNTER
Talked with Ashley Brown regarding Anibals behavior, reports he is yelling at her and is saying 'delusional: comments.   Advised her to have him evaluated at the hospital. [facility] understanding

## 2019-10-21 NOTE — TELEPHONE ENCOUNTER
Orders/Plan of care rec'd from [facility], Signed, faxed and sent to [facility]

## 2019-10-21 NOTE — TELEPHONE ENCOUNTER
Pts daughter Emilia Hogan who is on FYI, called stating pt is being delusional screaming for someone to help him.  Emilia Hogan does not know what to do and wants to speak to a nurse ASAP

## 2019-10-21 NOTE — ED INITIAL ASSESSMENT (HPI)
Per daughter via phone, patient has been emotional and acting out towards her. Patient states he is here for \"sore throat. \"

## 2019-10-22 NOTE — ED PROVIDER NOTES
Patient Seen in: [facility] Emergency Department      History   Patient presents with:  Eval-P (psychiatric)    Stated Complaint: From home for aggression towards daughter.     HPI    Patient is a 68-year-old male here with EMS after his daughter jabire prostate    • Platelet dysfunction due to drugs 3/2/2019   • Problems with swallowing    • Stroke [facility])    • TIA (transient ischemic attack) 8/25/2016   • Tracheostomy status (HCC)    • Type II or unspecified type diabetes mellitus without mention of compl round, and reactive to light. Neck: Normal range of motion. Neck supple. No JVD present. Cardiovascular: Normal rate and regular rhythm. Pulmonary/Chest: Effort normal and breath sounds normal. No stridor. Abdominal: Soft.  There is no tenderness I feel comfortable with this disposition. Disposition and Plan     Clinical Impression:  Aggressive behavior  (primary encounter diagnosis)    Disposition:  Discharge  10/21/2019  9:40 pm    Follow-up:  Guillermina Clifton, [address]  [place]

## 2019-10-22 NOTE — BH LEVEL OF CARE ASSESSMENT
Level of Care Assessment Note    General Questions  Why are you here?: Pt is a 68 yr old male who arrived to the ER via ambulance from his home d/t aggressive bx. Pt presented as ox3 (disoriented to situation).  Pt states he doesn't remember what happened t person, Collateral  Collateral Information: ER RN note: \"Per daughter via phone, patient has been emotional and acting out towards her. Patient states he is here for \"sore throat. \"\"    Family Collateral  Family Collateral: daughter/Healthcare POA and s Have you ever done anything, started to do anything, or prepared to do anything to end your life? (lifetime): No  Score -  OV: 1- Low Risk   Describe : pt states yesterday he had thoughts of wishing he was dead; states has the thoughts 3x a week; denies month ago, daughter states pt \"gets impatient and anxious\"    Access to Means  Has access to means to attempt suicide or harm others or property: Yes  Description of Access: household items  Access to Firearm/Weapon: No  Do you have a firearm owner ID ca Sleep Hours: 4 Hours(per daughter)  Use of Sleep Aids: Trazedone 100mg  Appetite Symptoms: Increased(pt states he's been more hungry, daughter states today pt said that she starved him all day)  Unplanned Weight Loss: Yes (comment)(about 60 lbs since [employer] applying hearing aids, shaving, etc.): Moderate assist(pt can shave; uses mouth rinse d/t rehab facility lost his dentures)  Toileting  Patient Incontinence:  Bowel;Bladder(pt wears a diaper; is able to say when he needs to use the toilet)  Special Consider School: No  Employment Status: Retired(retired from being a )  Job Issues:  Other (comment)  Concerns/Conflicts with Social Relationships: No  Decreased Functional Ability: Complete ADLs(needs assistance)  Do you have any prior/current legal concer other this morning but doesn't remember why. Pt states he doesn't remember throwing things. Daughter states pt has had increased aggression since his stroke in March 2019. Daughter states pt has had trouble sleeping and didn't sleep much last night.  Felicitas Mcguire [id number] [facility]  Refused Treatment: No  Education Provided: Call 911 in an Emergency;[facility] Crisis Line Number;Advised to call if condition worsens; Advised to call with questions  Transferred: No

## 2019-10-22 NOTE — ED NOTES
"ÇáÊåÇÈ ÇáÍáÞ ÇáÚõÞÏí  (Strep Throat)  ÇáÊåÇÈ ÇáÍáÞ ÇáÚõÞÏí ÚÈÇÑÉ Úä ÚÏæì ÈßÊíÑíÉ ÊÕíÈ ÇáÍáÞ. ÞÏ íØáÞ ãÞÏã ÇáÑÚÇíÉ ÇáÕÍíÉ Úáì ÇáÚÏæì ãÕØáÍ \"ÇáÊåÇÈ ÇááæÒÊíä\" Ãæ \"ÇáÊåÇÈ ÇáÈáÚæã\" ÈäÇÁð Úáì ãÇ ÅÐÇ ßÇä åäÇß ÊæÑã Ýí ÇááæÒÊíä Ãæ Ýí ãÄÎÑÉ ÇáÍáÞ. æíÚÊÈÑ ÇáÊåÇÈ ÇáÍáÞ ÇáÚõÞÏí ÃßËÑ ÔíæÚðÇ ÃËäÇÁ ÇáÃÔåÑ ÇáÈÇÑÏÉ ãä ÇáÚÇã áÏì ÇáÃØÝÇá ãä Óä 5 - 15 ÚÇãðÇ æáßäå íãßä Ãä íÕíÈ ÇáÃÔÎÇÕ Ýí Ãí Óä¡ æÎáÇá Ãí ÝÕá ãä ÇáÝÕæá. ÊäÊÞá Êáß ÇáÚÏæì ãä ÔÎÕ áÂÎÑ (ãÚÏí) Úä ØÑíÞ ÇáÓÚÇá Ãæ ÇáÚØÇÓ¡ Ãæ ÇáÇÊÕÇá ÇáÞÑíÈ.    ÇáÃÓÈÇÈ  íäÊÌ ÇáÊåÇÈ ÇáÍáÞ ÇáÚõÞÏí Úä ÈßÊÑíÇ ÊÓãìÇáÚÞÏíÉ ÇáãÞíÍÉ.  ÚæÇãá ÇáãÎÇØÑÉ  ãä ÇáÃÑÌÍ ÇáÅÕÇÈÉ ÈåÐå ÇáÍÇáÉ ÇáØÈíÉ áÏì:  • ÇáÃÔÎÇÕ ÇáÐíä íÞÖæä ÇáßËíÑ ãä ÇáæÞÊ Ýí ÃãÇßä ãÒÏÍãÉ ÍíË íÓåá ÇäÊÔÇÑ ÇáÚÏæì.  • ÇáÃÔÎÇÕ ÇáÐíä íßæäæä Úáì ÇÊÕÇá æËíÞ ÈÃÔÎÇÕ ãÕÇÈíä ÈÇáÊåÇÈ ÇáÍáÞ ÇáÚõÞÏí.  ÇáÃÚÑÇÖ  ÊÊÖãä ÃÚÑÇÖ åÐå ÇáÍÇáÉ ÇáØÈíÉ ãÇ íáí:  • Íãì Ãæ ÞÔÚÑíÑÉ.  • ÇÍãÑÇÑ Ãæ ÊæÑã Ãæ Ãáã Ýí ÇááæÒÊíä Ãæ ÇáÍáÞ.  • Ãáã Ãæ ÕÚæÈÉ Ýí ÇáÈáÚ.  • ÙåæÑ ÈÞÚ ÈíÖÇÁ Ãæ ÕÝÑÇÁ Úáì ÇááæÒÊíä Ãæ ÇáÍáÞ.  • ÙåæÑ ÛÏÏ ãÊæÑãÉ æãÄáãÉ Ýí ÇáÚäÞ Ãæ ÃÓÝá ÇáÝß.  • ÙåæÑ ØÝÍ ÌáÏí Úáì ÌãíÚ ÃÌÒÇÁ ÇáÌÓã (äÇÏÑ).  ÇáÊÔÎíÕ  íÊã ÊÔÎíÕ ÇáÅÕÇÈÉ ÈåÐå ÇáÍÇáÉ Úä ØÑíÞ ÅÌÑÇÁ ÇÎÊÈÇÑ ÚõÞÏí ÓÑíÚ Ãæ ÃÎÐ ãÓÍÉ ãä ÇáÍáÞ (ÇÎÊÈÇÑ ãÒÑÚÉ ÍáÞ). ÚÇÏÉ ãÇ ÊÕÈÍ äÊÇÆÌ ÇáÇÎÊÈÇÑ ÇáÚõÞÏí ÇáÓÑíÚ ÌÇåÒÉ Ýí ÛÖæä ÏÞÇÆÞ ãÚÏæÏÉ¡ ÈíäãÇ íÓÊÛÑÞ ÙåæÑ äÊÇÆÌ ÇÎÊÈÇÑ ãÒÑÚÉ ÇáÍáÞ ãä íæã Åáì íæãíä.  ÇáÚáÇÌ  íÊã ÚáÇÌ åÐå ÇáÍÇáÉ ÈÏæÇÁ ãÖÇÏ Ííæí.  ÅÑÔÇÏÇÊ ÇáÑÚÇíÉ ÇáãäÒáíÉ  ÇáÃÏæíÉ  • áÇ ÊÊäÇæá ÇáÃÏæíÉ ÇáÊí ÊõÈÇÚ Ïæä æÕÝÉ ØÈíÉ æÊáß ÇáÊí ÊÈÇÚ ÈæÕÝÉ ØÈíÉ ÅáÇ ßãÇ ÃÎÈÑß ãõÞÏã ÇáÑÚÇíÉ ÇáÕÍíÉ.  • ÊäÇæá ÇáãÖÇÏ ÇáÍíæí ÍÓÈ ÅÑÔÇÏÇÊ ãÞÏã ÇáÑÚÇíÉ ÇáÕÍíÉ. áÇ ÊÊæÞÝ Úä ÊäÇæá ÇáãÖÇÏ ÇáÍíæí ÍÊì ãÚ ÊÍÓä ÍÇáÊß.  • ÇÌÚá ÃÝÑÇÏ ÃÓÑÊß ÇáÐíä íÚÇäæä ãä ÇáÊåÇÈ ÇáÍáÞ Ãæ ÇáÍãì ÅÌÑÇÁ ÇÎÊÈÇÑ ÇáÊåÇÈ ÇáÍáÞ ÇáÚõÞÏí. ÞÏ íÍÊÇÌæä Åáì ÊäÇæá ÇáãÖÇÏÇÊ ÇáÍíæíÉ ÅÐÇ ßÇäæÇ ãÕÇÈíä ÈÚÏæì ÇáÊåÇÈ ÇáÍáÞ ÇáÚõÞÏí.  ÊäÇæá ÇáØÚÇã æÇáÔÑÇÈ  • áÇÊÊÔÇÑß ÇáØÚÇã Ãæ ÃßæÇÈ ÇáÔÑÇÈ Ãæ ÇáÃÛÑÇÖ ÇáÔÎÕíÉ ÇáÊí ÞÏ ÊÊÓÈÈ Ýí ÇäÊÞÇá ÇáÚÏæì Åáì ÃÔÎÇÕ ÂÎÑíä.  • Ýí ÍÇáÉ ÕÚæÈÉ ÇáÈáÚ¡ ÍÇæá ÊäÇæá ãÃßæáÇÊ áíäÉ ÍÊì ÊÊÍÓä " Patient cleaned of wet depend. Fresh depend placed on patient. ÍÇáÉ ÇáÊåÇÈ ÇáÍáÞ áÏíß.  • ÇÔÑÈ ßãíÉ ßÇÝíÉ ãä ÇáÓæÇÆá ááÍÝÇÙ Úáì áæä ÇáÈæá ÃÕÝÑ ÈÇåÊðÇ Ãæ ÔÝÇÝðÇ.  ÊÚáíãÇÊ ÚÇãÉ  • ÊÛÑÛÑ ÈãÒíÌ ãä ÇáãÇÁ æÇáãáÍ ËáÇË Ãæ ÃÑÈÚ ãÑÇÊ íæãíÇð Ãæ ÍÓÈ ÇáÍÇÌÉ. áÅÚÏÇÏ ãÒíÌ ÇáãÇÁ æÇáãáÍ¡ Þã ÈÅÐÇÈÉ ãÚáÞÉ Ãæ äÕÝ ãÚáÞÉ ÔÇí ãä ÇáãáÍ Ýí ßæÈ ãä ÇáãÇÁ ÇáÏÇÝÆ.  • ÇÍÑÕ Úáì ÞíÇã ÌãíÚ ÃÝÑÇÏ ÇáÃÓÑÉ ÈÛÓá íÏíåã ÌíÏðÇ.  • ÇÍÕá Úáì ÞÓØ ÃØæá ãä ÇáÑÇÍÉ.  • áÇ ÊÐåÈ Åáì ÇáãÏÑÓÉ Ãæ ÇáÚãá ÅáÇ ÈÚÏ ÊäÇæá ÇáãÖÇÏ ÇáÍíæí áãÏÉ 24 ÓÇÚÉ.  • ÍÇÝÙ Úáì ÌãíÚ ãæÇÚíÏ ÇáãÊÇÈÚÉ ßãÇ ÃÎÈÑß ãÞÏã ÇáÑÚÇíÉ ÇáÕÍíÉ. æåÐÇ ÃãÑ ãåã.  ÇØáÈ ÇáÑÚÇíÉ ÇáØÈíÉ Ýí ÍÇáÉ:  • ÇÓÊãÑÇÑ ÊÖÎã ÇáÛÏÏ ÇáãæÌæÏÉ Ýí ÚäÞß.  • ÇáÅÕÇÈÉ ÈØÝÍ Ãæ ÓÚÇá Ãæ Ãáã ÈÇáÃÐä.  • ÇáÅÕÇÈÉ ÈÓÚÇá ãÕÍæÈ ÈÈÕÇÞ Óãíß ÃÎÖÑ Ãæ Èäí ãÇÆá ááÇÕÝÑÇÑ Ãæ Ïãæí.  • ÇáÅÕÇÈÉ ÈÃáã áÇ íÒæá ÍÊì ãÚ ÊäÇæá ÇáÏæÇÁ.  • ÇáÔÚæÑ ÈÃä ÇáÃÚÑÇÖ ÊÓæÁ ÈÏáÇð ãä Ãä ÊÊÍÓä.  • ÇáÅÕÇÈÉ ÈÇáÍãì.  ÇØáÈ ÇáÑÚÇíÉ ÇáØÈíÉ ÇáÝæÑíÉ Ýí ÍÇáÉ:  • ÅÕÇÈÊß ÈÃíÉ ÃÚÑÇÖ ÌÏíÏÉ¡ ãËá: ÇáÞíÁ¡ Ãæ ÇáÕÏÇÚ ÇáÍÇÏ¡ Ãæ ÊíÈÓ ÇáÚäÞ Ãæ ÇáÔÚæÑ ÈÃáã Ýíå¡ Ãæ Ãáã Ýí ÇáÕÏÑ¡ Ãæ ÖíÞ Ýí ÇáÊäÝÓ.  • ÇáÔÚæÑ ÈÃáã ÍÇÏ Ýí ÇáÍáÞ¡ Ãæ æÌæÏ áÚÇÈ ÓÇÆá¡ Ãæ ÍÏæË ÊÛíÑÇÊ Ýí ÇáÕæÊ.  • ÇáÅÕÇÈÉ ÈÊæÑã Ýí ÇáÚäÞ¡ Ãæ ÇÍãÑÇÑ ÌáÏ ÇáÚäÞ æÇáÔÚæÑ ÈÃáã Èå.  • ÇáÅÕÇÈÉ ÈÚáÇãÇÊ ÇáÌÝÇÝ¡ ãËá: ÇáÅÑåÇÞ¡ æÌÝÇÝ ÇáÝã¡ æÞáÉ ÇáÈæá.  • ÒíÇÏÉ ÇáÔÚæÑ ÈÇáäÚÇÓ¡ Ãæ ÚÏã ÇáÞÏÑÉ Úáì ÇáÇÓÊíÞÇÙ ÊãÇãðÇ.  • ÅÕÇÈÉ ÇáãÝÇÕá ÈÇáÇÍãÑÇÑ Ãæ ÇáÃáã.  áíÓ ÇáåÏÝ ãä åÐå ÇáãÚáæãÇÊ Ãä Êßæä ÈÏíáÇð ááÅÑÔÇÏÇÊ ÇáÊí íÞÏãåÇ ãæÝÑ ÇáÑÚÇíÉ ÇáÕÍíÉ. ÊÃßÏ ãä ãäÇÞÔÉ ÃíÉ ÃÓÆáÉ ÊÏæÑ Ýí Ðåäß ãÚ ãæÝÑ ÇáÑÚÇíÉ ÇáÕÍíÉ.þ  Document Released: 08/15/2012 Document Revised: 09/07/2016 Document Reviewed: 04/11/2016  Elsevier Interactive Patient Education © 2017 Elsevier Inc.

## 2019-10-23 ENCOUNTER — TELEPHONE (OUTPATIENT)
Dept: FAMILY MEDICINE CLINIC | Facility: CLINIC | Age: 69
End: 2019-10-23

## 2019-10-23 NOTE — TELEPHONE ENCOUNTER
Paola Odell, reports he was seen by Dr Bell [facility]!   He will continue to see this MD

## 2019-10-24 ENCOUNTER — HOSPITAL ENCOUNTER (EMERGENCY)
Facility: HOSPITAL | Age: 69
Discharge: HOME | End: 2019-10-24
Attending: EMERGENCY MEDICINE
Payer: MEDICARE

## 2019-10-24 ENCOUNTER — APPOINTMENT (OUTPATIENT)
Dept: CT IMAGING | Facility: HOSPITAL | Age: 69
End: 2019-10-24
Attending: EMERGENCY MEDICINE
Payer: MEDICARE

## 2019-10-24 VITALS
SYSTOLIC BLOOD PRESSURE: 119 MMHG | OXYGEN SATURATION: 96 % | RESPIRATION RATE: 19 BRPM | HEIGHT: 66 IN | WEIGHT: 156.06 LBS | DIASTOLIC BLOOD PRESSURE: 85 MMHG | BODY MASS INDEX: 25.08 KG/M2 | TEMPERATURE: 99 F | HEART RATE: 104 BPM

## 2019-10-24 DIAGNOSIS — S09.90XA MINOR HEAD INJURY, INITIAL ENCOUNTER: Primary | ICD-10-CM

## 2019-10-24 DIAGNOSIS — S16.1XXA STRAIN OF NECK MUSCLE, INITIAL ENCOUNTER: ICD-10-CM

## 2019-10-24 PROCEDURE — 99284 EMERGENCY DEPT VISIT MOD MDM: CPT

## 2019-10-24 PROCEDURE — 70450 CT HEAD/BRAIN W/O DYE: CPT | Performed by: EMERGENCY MEDICINE

## 2019-10-24 PROCEDURE — 72125 CT NECK SPINE W/O DYE: CPT | Performed by: EMERGENCY MEDICINE

## 2019-10-24 RX ORDER — CYCLOBENZAPRINE HCL 10 MG
5 TABLET ORAL 3 TIMES DAILY PRN
Qty: 20 TABLET | Refills: 0 | Status: SHIPPED | OUTPATIENT
Start: 2019-10-24 | End: 2019-10-31

## 2019-10-24 RX ORDER — TRAMADOL HYDROCHLORIDE 50 MG/1
50 TABLET ORAL ONCE
Status: COMPLETED | OUTPATIENT
Start: 2019-10-24 | End: 2019-10-24

## 2019-10-24 NOTE — ED INITIAL ASSESSMENT (HPI)
Per EMS, pt from home and fell lastnight and had a witnessed fall, hitting his head while trying to transfer from bed to wheel chair    Pt on aspirin and plavix

## 2019-10-24 NOTE — ED PROVIDER NOTES
Patient Seen in: [facility] Emergency Department      History   Patient presents with:  Fall (musculoskeletal, neurologic)    Stated Complaint: fall yesterday, plavix and aspirin    HPI    Patient is a 68-year-old male who presents via ambulance fro artery aneurysm, left ([facility] [address] 8/25/2016    Lawrence repair at [facility] Jane Wills/Raj 2/22/2017   • Incontinence    • Nontraumatic intracerebral hemorrhage in hemisphere, subcortical [facility])    • Personal history of malignant neoplasm of prostate    • Platelet dy 167.6 cm (5' 6\")   Wt 70.8 kg   SpO2 97%   BMI 25.19 kg/m²         Physical Exam  Vitals signs and nursing note reviewed. Constitutional:       Appearance: He is well-developed. HENT:      Head: Normocephalic.       Comments: Small, superficial subcent after a fall last night. He was transferring from bed to a wheelchair, lost his balance and fell, striking his head on the floor.   He is on Plavix and aspirin but he did not lose consciousness he did not complain of anything so family just kept an eye on

## 2019-10-29 ENCOUNTER — TELEPHONE (OUTPATIENT)
Dept: FAMILY MEDICINE CLINIC | Facility: CLINIC | Age: 69
End: 2019-10-29

## 2019-10-29 ENCOUNTER — OFFICE VISIT (OUTPATIENT)
Dept: PODIATRY CLINIC | Facility: CLINIC | Age: 69
End: 2019-10-29
Payer: COMMERCIAL

## 2019-10-29 DIAGNOSIS — M79.674 PAIN IN TOES OF BOTH FEET: Primary | ICD-10-CM

## 2019-10-29 DIAGNOSIS — B35.1 ONYCHOMYCOSIS: ICD-10-CM

## 2019-10-29 DIAGNOSIS — M79.675 PAIN IN TOES OF BOTH FEET: Primary | ICD-10-CM

## 2019-10-29 PROCEDURE — 11720 DEBRIDE NAIL 1-5: CPT | Performed by: PODIATRIST

## 2019-10-29 PROCEDURE — 99202 OFFICE O/P NEW SF 15 MIN: CPT | Performed by: PODIATRIST

## 2019-10-29 RX ORDER — RISPERIDONE 0.25 MG/1
0.5 TABLET, FILM COATED ORAL 2 TIMES DAILY
Refills: 0 | COMMUNITY
Start: 2019-10-23 | End: 2020-01-06 | Stop reason: DRUGHIGH

## 2019-10-29 RX ORDER — LORAZEPAM 1 MG/1
TABLET ORAL EVERY 4 HOURS PRN
Refills: 0 | COMMUNITY
Start: 2019-10-22

## 2019-10-29 NOTE — PROGRESS NOTES
HPI:    Patient ID: Gavin López is a 68year old male. 57-year-old male presents as a new patient to me and is self-referred. He is accompanied today by a caregiver. He is in a gurney. Patient had a stroke approximately 7 to 8 months ago.   He does n Disp: 90 tablet, Rfl: 1  tamsulosin HCl 0.4 MG Oral Cap, Take 1 capsule (0.4 mg total) by mouth daily. , Disp: 90 capsule, Rfl: 1  metFORMIN HCl  MG Oral Tablet 24 Hr, Take 1 tablet (750 mg total) by mouth 2 (two) times daily with meals. , Disp: 180 ta 30 patch, Rfl: 3      Allergies:  Lisinopril              SWELLING, ANGIOEDEMA    Comment:Severe reaction/hospitalized  Radiology Contrast *    ANGIOEDEMA    Comment:Exacerbation of tongue/lip angioedema following IV             contrast for abdominal CT 4

## 2019-11-04 ENCOUNTER — TELEPHONE (OUTPATIENT)
Dept: FAMILY MEDICINE CLINIC | Facility: CLINIC | Age: 69
End: 2019-11-04

## 2019-11-07 ENCOUNTER — TELEPHONE (OUTPATIENT)
Dept: FAMILY MEDICINE CLINIC | Facility: CLINIC | Age: 69
End: 2019-11-07

## 2019-11-12 ENCOUNTER — TELEPHONE (OUTPATIENT)
Dept: FAMILY MEDICINE CLINIC | Facility: CLINIC | Age: 69
End: 2019-11-12

## 2019-11-12 NOTE — TELEPHONE ENCOUNTER
Suhail requesting authorization for DME to [facility]  Fax to [phone]    [employer] [employer] tax ID [id number]  NPI [id number]    Referral requests faxed.  conf rec'd

## 2019-11-13 ENCOUNTER — OFFICE VISIT (OUTPATIENT)
Dept: OPTOMETRY | Age: 69
End: 2019-11-13

## 2019-11-13 DIAGNOSIS — H52.4 PRESBYOPIA: Primary | ICD-10-CM

## 2019-11-13 DIAGNOSIS — E11.9 TYPE 2 DIABETES MELLITUS WITHOUT RETINOPATHY (CMD): ICD-10-CM

## 2019-11-13 PROCEDURE — 92015 DETERMINE REFRACTIVE STATE: CPT | Performed by: OPTOMETRIST

## 2019-11-13 PROCEDURE — 92004 COMPRE OPH EXAM NEW PT 1/>: CPT | Performed by: OPTOMETRIST

## 2019-11-13 ASSESSMENT — TONOMETRY
OD_IOP_MMHG: 15
IOP_METHOD: TONOPEN
OS_IOP_MMHG: 11

## 2019-11-13 ASSESSMENT — CUP TO DISC RATIO
OS_RATIO: 0.3
OD_RATIO: 0.3

## 2019-11-13 ASSESSMENT — REFRACTION_MANIFEST
OS_SPHERE: PLANO
OD_SPHERE: -0.50
OS_ADD: +2.50
OD_ADD: +2.50

## 2019-11-13 ASSESSMENT — VISUAL ACUITY
OD_SC: J5@16
OD_SC+: +2
OD_SC: 20/30
OS_SC+: +2
OS_SC: 20/30
OS_SC: J5@16
METHOD: SNELLEN - LINEAR

## 2019-11-13 ASSESSMENT — EXTERNAL EXAM - LEFT EYE: OS_EXAM: NORMAL

## 2019-11-13 ASSESSMENT — SLIT LAMP EXAM - LIDS
COMMENTS: NORMAL
COMMENTS: NORMAL

## 2019-11-13 ASSESSMENT — EXTERNAL EXAM - RIGHT EYE: OD_EXAM: NORMAL

## 2019-11-17 ENCOUNTER — HOSPITAL ENCOUNTER (EMERGENCY)
Facility: HOSPITAL | Age: 69
Discharge: HOME | End: 2019-11-17
Attending: EMERGENCY MEDICINE
Payer: MEDICARE

## 2019-11-17 VITALS
SYSTOLIC BLOOD PRESSURE: 114 MMHG | DIASTOLIC BLOOD PRESSURE: 68 MMHG | TEMPERATURE: 98 F | RESPIRATION RATE: 16 BRPM | OXYGEN SATURATION: 98 % | HEART RATE: 78 BPM

## 2019-11-17 DIAGNOSIS — S00.01XA ABRASION OF SCALP, INITIAL ENCOUNTER: Primary | ICD-10-CM

## 2019-11-17 PROCEDURE — 99282 EMERGENCY DEPT VISIT SF MDM: CPT

## 2019-11-17 PROCEDURE — 82962 GLUCOSE BLOOD TEST: CPT

## 2019-11-17 NOTE — ED INITIAL ASSESSMENT (HPI)
YF c/c of head lac Pt family state pt scratch his head and has a small lac with bleeding controled at this time

## 2019-11-17 NOTE — ED PROVIDER NOTES
Patient Seen in: [facility] Emergency Department      History   Patient presents with:  Laceration Abrasion (integumentary)    Stated Complaint: head lac     HPI    No male past medical history of hypertension hyperlipidemia CAD AAA on Plavix now pr TIA (transient ischemic attack) 8/25/2016   • Tracheostomy status (HCC)    • Type II or unspecified type diabetes mellitus without mention of complication, not stated as uncontrolled    • Unspecified essential hypertension    • [facility]2 Bowel sounds are normal.      Palpations: Abdomen is soft. Musculoskeletal:         General: No deformity. Skin:     General: Skin is warm and dry. Capillary Refill: Capillary refill takes less than 2 seconds.    Neurological:      Mental Status: H

## 2019-11-19 ENCOUNTER — TELEPHONE (OUTPATIENT)
Dept: FAMILY MEDICINE CLINIC | Facility: CLINIC | Age: 69
End: 2019-11-19

## 2019-11-21 NOTE — TELEPHONE ENCOUNTER
Called Carmella at [phone] to check status of DME referral/orders for DME  Call Ref # [id number]  Reinitiated prior auth for all DME equipment  Authorization number is [id number]  Valid from 9/21/2019-9/21/2020    Called Suhail at [phone] to yang

## 2019-11-27 ENCOUNTER — TELEPHONE (OUTPATIENT)
Dept: FAMILY MEDICINE CLINIC | Facility: CLINIC | Age: 69
End: 2019-11-27

## 2019-12-02 ENCOUNTER — TELEPHONE (OUTPATIENT)
Dept: FAMILY MEDICINE CLINIC | Facility: CLINIC | Age: 69
End: 2019-12-02

## 2019-12-02 NOTE — TELEPHONE ENCOUNTER
Pts daughter Jasmin Mathew who is on pts FYI, is calling stating pt needs an order sent to get a  for patient. Order needs to be sent to be sent for them to get it. Pt did get the wheel chair and hospital bed but no .

## 2019-12-03 ENCOUNTER — OFFICE VISIT (OUTPATIENT)
Dept: FAMILY MEDICINE CLINIC | Facility: CLINIC | Age: 69
End: 2019-12-03
Payer: MEDICARE

## 2019-12-03 VITALS
SYSTOLIC BLOOD PRESSURE: 122 MMHG | OXYGEN SATURATION: 98 % | HEART RATE: 85 BPM | TEMPERATURE: 99 F | DIASTOLIC BLOOD PRESSURE: 68 MMHG

## 2019-12-03 DIAGNOSIS — E78.2 MIXED HYPERLIPIDEMIA: ICD-10-CM

## 2019-12-03 DIAGNOSIS — F51.04 PSYCHOPHYSIOLOGICAL INSOMNIA: ICD-10-CM

## 2019-12-03 DIAGNOSIS — I69.354 HEMIPARESIS AFFECTING LEFT SIDE AS LATE EFFECT OF STROKE (HCC): Primary | ICD-10-CM

## 2019-12-03 DIAGNOSIS — E11.42 TYPE 2 DIABETES MELLITUS WITH DIABETIC POLYNEUROPATHY, WITH LONG-TERM CURRENT USE OF INSULIN (HCC): ICD-10-CM

## 2019-12-03 DIAGNOSIS — G81.94 LEFT HEMIPARESIS (HCC): ICD-10-CM

## 2019-12-03 DIAGNOSIS — Z23 NEED FOR VACCINATION: ICD-10-CM

## 2019-12-03 DIAGNOSIS — Z95.5 S/P CORONARY ARTERY STENT PLACEMENT: ICD-10-CM

## 2019-12-03 DIAGNOSIS — Z79.4 TYPE 2 DIABETES MELLITUS WITH DIABETIC POLYNEUROPATHY, WITH LONG-TERM CURRENT USE OF INSULIN (HCC): ICD-10-CM

## 2019-12-03 DIAGNOSIS — K59.01 SLOW TRANSIT CONSTIPATION: ICD-10-CM

## 2019-12-03 PROCEDURE — 99214 OFFICE O/P EST MOD 30 MIN: CPT | Performed by: FAMILY MEDICINE

## 2019-12-03 PROCEDURE — G0008 ADMIN INFLUENZA VIRUS VAC: HCPCS | Performed by: FAMILY MEDICINE

## 2019-12-03 PROCEDURE — 90662 IIV NO PRSV INCREASED AG IM: CPT | Performed by: FAMILY MEDICINE

## 2019-12-03 NOTE — PROGRESS NOTES
CHIEF COMPLAINT: Patient presents with: Follow - Up: PT options        HPI:     Rajan Chen is a 68year old male presents for follow-up. Patient brought by ambulance. Much more awake.   Answers some questions but at times becomes frustrated when sp was less than 6.0. Told from [facility] diabetic clinic may follow-up with PCP for management of diabetes. Denies burning in right foot. Caretaker admitted to mild pressure sore in left heel and was applying medical honey cream and is completely resolved.   Edwin Peace Unspecified essential hypertension    • Unstable angina ([facility] [address] 4/22/2015   • Vertigo 1/26/2015      Past Surgical History:   Procedure Laterality Date   • ANGIOGRAM     • ANGIOPLASTY (CORONARY)     • CABG     • CATH DRUG ELUTING STENT     • CYSTOSCOPY Marlena Hooper daily. 90 tablet 1   • Ferrous Sulfate (FERROUSUL) 325 (65 Fe) MG Oral Tab Take 1 tablet (325 mg total) by mouth daily with breakfast. 90 tablet 3   • traZODone HCl 100 MG Oral Tab Take 1 tablet (100 mg total) by mouth nightly.  (Patient taking differently: Patch Place 2 patches onto the skin daily as needed (left  posterior shoulder, diclofenac gel superior shoulder). (Patient not taking: Reported on 12/3/2019 ) 30 patch 3   • Diclofenac Sodium 1 % Transdermal Gel Apply 1 g topically 4 (four) times daily.  Magdajessica Marinas pedal pulse exam of both lower legs/feet is decreased +1 bilateral  LABS     Admission on 11/17/2019, Discharged on 11/17/2019   Component Date Value   • POC Glucose 11/17/2019 86    Admission on 10/24/2019, Discharged on 10/24/2019   Component Date Value Date 10/21/2019 6/21/21       REVIEWED THIS VISIT  ASSESSMENT/PLAN:   68year old male with    1. Mixed hyperlipidemia  - LIPID PANEL  - LIPID PANEL; Future  - LIPID PANEL  - COMP METABOLIC PANEL (14); Future  Now and every 6 months    2.  Type 2 diabetes m physician office-hepatitis B #2 of 3 of the series   • PEG 3350 Oral Powd Pack 100 each 11     Sig: Take 17 g by mouth daily.        Health Maintenance:  FIT Colorectal Screening due on 08/30/1998  Colonoscopy due on 08/30/1998  Influenza Vaccine(1) due on Cholelithiasis     Nontraumatic subcortical hemorrhage of right cerebral hemisphere [facility])     Urinary tract infection associated with indwelling urethral catheter (HCC)     Major depressive disorder     CAD, multiple vessel     PVD (peripheral vascular dis

## 2019-12-03 NOTE — TELEPHONE ENCOUNTER
Daughter, Mortimer Coke, reports [facility] still did not receive fax  Faxed again conf staci   Used another fax provided on [phone]    Received copy of [employer] authorization  Sent to peng

## 2019-12-03 NOTE — PATIENT INSTRUCTIONS
As of October 6th 2014, the Drug Enforcement Agency [facility]) is reclassifying all hydrocodone combination medications from Schedule III to Schedule II. This includes medications such as Norco, Vicodin, Lortab, Zohydro, and Vicoprofen.      What this means for prior to exam-in the next couple of weeks-please check lipids.   · Next diabetic labs and lipids are due in 6 months  · Follow-up in 3 months after physical therapy complete      Constipation (Adult)  Constipation means that you have bowel movements that ar This is especially true if you have another medical problems, are taking prescription medicines, or are an older adult. Treatment most often involves lifestyle changes. You may also need medicines.  Your healthcare provider will tell you which will work bes (38°C) or higher, or as directed by your healthcare provider  · Failure to resume normal bowel movements  · Pain in your abdomen or back gets worse  · Nausea or vomiting  · Swelling in your abdomen  · Blood in the stool  · Black, tarry stool  · Involuntary again. Laxatives  Your healthcare provider may suggest an over-the-counter product to help ease your constipation. He or she may suggest the use of bulk-forming agents or laxatives. The use of laxatives, if used as directed, is common and safe.  Follow dir eat 6 to 8 ounces a day. Include wheat and oat bran cereals, whole-wheat muffins or toast, and corn tortillas in your meals. · Fruits. Try to eat 2 cups a day. Apples, oranges, strawberries, pears, and bananas are good sources.  (Note: Fruit juice is low i

## 2019-12-04 PROBLEM — K59.01 SLOW TRANSIT CONSTIPATION: Status: ACTIVE | Noted: 2019-12-04

## 2019-12-04 RX ORDER — POLYETHYLENE GLYCOL 3350 17 G/17G
17 POWDER, FOR SOLUTION ORAL DAILY
Qty: 100 EACH | Refills: 11 | Status: SHIPPED | OUTPATIENT
Start: 2019-12-04

## 2019-12-06 ENCOUNTER — TELEPHONE (OUTPATIENT)
Dept: FAMILY MEDICINE CLINIC | Facility: CLINIC | Age: 69
End: 2019-12-06

## 2019-12-06 RX ORDER — OFLOXACIN 3 MG/ML
SOLUTION/ DROPS OPHTHALMIC
Qty: 10 ML | Refills: 0 | Status: SHIPPED | OUTPATIENT
Start: 2019-12-06 | End: 2019-12-11

## 2019-12-06 NOTE — TELEPHONE ENCOUNTER
Prescribed Rx Ocuflox-if eyes not improving in the next 2 days or develops vomiting or severe headache or eye swells shot or fever then needs to be evaluated immediately. Please inform.

## 2019-12-06 NOTE — TELEPHONE ENCOUNTER
Patients daughter called and send that is left eye is all red and was wondering if Dr. Mellisa Fitzgerald would prescribe something since he is bed ridden.   Please call Itzel Mcguire at [phone]

## 2019-12-06 NOTE — TELEPHONE ENCOUNTER
LMOM to return call to the office. Provided pt office phone [phone] along with office hours given. Is there drainage from eye? Was there an injury?

## 2019-12-09 ENCOUNTER — TELEPHONE (OUTPATIENT)
Dept: FAMILY MEDICINE CLINIC | Facility: CLINIC | Age: 69
End: 2019-12-09

## 2019-12-09 NOTE — TELEPHONE ENCOUNTER
[facility] care orders 11--11-  Treatments  Orders signed,faxed and sent to scanning

## 2019-12-10 ENCOUNTER — TELEPHONE (OUTPATIENT)
Dept: FAMILY MEDICINE CLINIC | Facility: CLINIC | Age: 69
End: 2019-12-10

## 2019-12-10 NOTE — TELEPHONE ENCOUNTER
Received order request form from [facility] for hydraulic lift  Order signed by MD and faxed back to [facility] 12/9/2019.  conf rec'd    Copy to scanning

## 2019-12-10 NOTE — TELEPHONE ENCOUNTER
Basil Spencer from [facility] called to inform dr patient was admitted to home health services as of 12/10/19 for pt, and ot. Basil Spencer states patient will need nursing services as well.      Basil Spencer (physical therapist) [phone]

## 2019-12-11 RX ORDER — OFLOXACIN 3 MG/ML
SOLUTION/ DROPS OPHTHALMIC
Qty: 10 ML | Refills: 0 | Status: ON HOLD | OUTPATIENT
Start: 2019-12-11 | End: 2020-09-14 | Stop reason: ALTCHOICE

## 2019-12-11 NOTE — TELEPHONE ENCOUNTER
Tammy Edmonds was admitted for PT/OT with [facility]  ( discharged from [facility])    Requesting nurse visits due to pressure sore developing on sacral area stage 1. they will send new form to complete    Reports Itzel Mcguire purchased a motorized wheel c

## 2019-12-11 NOTE — TELEPHONE ENCOUNTER
LMOM to return call to the office. Provided pt office phone [phone] along with office hours given. Confirm which home health Mobley Malling is with  [facility].    Anderson made need an apt with Dr Tan Jimenez

## 2019-12-11 NOTE — TELEPHONE ENCOUNTER
Amita Reddy with [facility] is returning nurses call. Amita Reddy can be reached at [phone].

## 2019-12-11 NOTE — TELEPHONE ENCOUNTER
Requested Prescriptions     Signed Prescriptions Disp Refills   • ofloxacin 0.3 % Ophthalmic Solution 10 mL 0     Si gtt to affected eye(s) every 2 hours x 2 days then 1 gtt affected eye(s) every 6hours x 5 days- total is 7 days.      Authorizing [employer]

## 2019-12-12 ENCOUNTER — TELEPHONE (OUTPATIENT)
Dept: FAMILY MEDICINE CLINIC | Facility: CLINIC | Age: 69
End: 2019-12-12

## 2019-12-12 NOTE — TELEPHONE ENCOUNTER
Erika Joshua occupational therapist with [facility] is calling to state that the point of care its going to be 1 a week for 4 weeks and they will be working on seating balance range of motion fall prevention caregiver training and ADLs.

## 2019-12-13 ENCOUNTER — TELEPHONE (OUTPATIENT)
Dept: FAMILY MEDICINE CLINIC | Facility: CLINIC | Age: 69
End: 2019-12-13

## 2019-12-13 NOTE — TELEPHONE ENCOUNTER
Last office visit 12/3/2019    Ok per Dr Cuauhtemoc Rene for patient to take MOM 27cc tonight    Spoke with daughter Paola Fairly and reports she gave him an enema with successful results

## 2019-12-13 NOTE — TELEPHONE ENCOUNTER
Alberto Ask patients daughter called and states Patient is really constipated . Alberto Ask would like a nurse to call back to discuss what she can do to help patient.      [phone]

## 2019-12-19 ENCOUNTER — TELEPHONE (OUTPATIENT)
Dept: FAMILY MEDICINE CLINIC | Facility: CLINIC | Age: 69
End: 2019-12-19

## 2019-12-30 ENCOUNTER — TELEPHONE (OUTPATIENT)
Dept: FAMILY MEDICINE CLINIC | Facility: CLINIC | Age: 69
End: 2019-12-30

## 2019-12-30 DIAGNOSIS — L89.321 STAGE I PRESSURE ULCER OF LEFT BUTTOCK: Primary | ICD-10-CM

## 2019-12-30 DIAGNOSIS — I73.9 PVD (PERIPHERAL VASCULAR DISEASE) (HCC): ICD-10-CM

## 2019-12-30 DIAGNOSIS — I69.354 HEMIPARESIS AFFECTING LEFT SIDE AS LATE EFFECT OF STROKE (HCC): ICD-10-CM

## 2019-12-30 DIAGNOSIS — G57.93 NEUROPATHY INVOLVING BOTH LOWER EXTREMITIES: ICD-10-CM

## 2019-12-30 NOTE — TELEPHONE ENCOUNTER
Walter Miller with [facility] [employer] called to give information in regards to the Mattress for the PT. Walter Miller stated that they prefer the Alternating Pressure Mattress Pad for patient the code for it is .  The company is [employer] their phone n

## 2019-12-30 NOTE — TELEPHONE ENCOUNTER
Spoke with Devan Sosa with [facility] regarding DME mattress request.  She will contact PT to confirm what mattress options because the current request will not be covered by [employer] due to ulcer staging

## 2019-12-30 NOTE — TELEPHONE ENCOUNTER
Christian Kwong from [facility] called to request a referral for patient mattress to be sent to insurance. Insurance requires referral for this order.

## 2020-01-02 ENCOUNTER — TELEPHONE (OUTPATIENT)
Dept: FAMILY MEDICINE CLINIC | Facility: CLINIC | Age: 70
End: 2020-01-02

## 2020-01-02 NOTE — TELEPHONE ENCOUNTER
Orders received for skin protectant for pressure ulcer.  Orders signed and faxed   Conf rec'd  Copy to scanning

## 2020-01-02 NOTE — TELEPHONE ENCOUNTER
[facility] called stating that patient will be released from home health and that his care giver will be doing out patient therapy.

## 2020-01-06 ENCOUNTER — TELEPHONE (OUTPATIENT)
Dept: FAMILY MEDICINE CLINIC | Facility: CLINIC | Age: 70
End: 2020-01-06

## 2020-01-06 ENCOUNTER — OFFICE VISIT (OUTPATIENT)
Dept: FAMILY MEDICINE CLINIC | Facility: CLINIC | Age: 70
End: 2020-01-06
Payer: MEDICARE

## 2020-01-06 VITALS
RESPIRATION RATE: 18 BRPM | SYSTOLIC BLOOD PRESSURE: 124 MMHG | OXYGEN SATURATION: 97 % | HEART RATE: 119 BPM | DIASTOLIC BLOOD PRESSURE: 70 MMHG | TEMPERATURE: 98 F

## 2020-01-06 DIAGNOSIS — G81.94 LEFT HEMIPARESIS (HCC): ICD-10-CM

## 2020-01-06 DIAGNOSIS — F33.1 MODERATE EPISODE OF RECURRENT MAJOR DEPRESSIVE DISORDER (HCC): ICD-10-CM

## 2020-01-06 DIAGNOSIS — Z79.4 TYPE 2 DIABETES MELLITUS WITH DIABETIC POLYNEUROPATHY, WITH LONG-TERM CURRENT USE OF INSULIN (HCC): ICD-10-CM

## 2020-01-06 DIAGNOSIS — G81.94 ACUTE LEFT HEMIPARESIS (HCC): ICD-10-CM

## 2020-01-06 DIAGNOSIS — S43.005D DISLOCATION OF LEFT SHOULDER JOINT, SUBSEQUENT ENCOUNTER: ICD-10-CM

## 2020-01-06 DIAGNOSIS — S99.922A FOOT INJURY, LEFT, INITIAL ENCOUNTER: Primary | ICD-10-CM

## 2020-01-06 DIAGNOSIS — E11.42 TYPE 2 DIABETES MELLITUS WITH DIABETIC POLYNEUROPATHY, WITH LONG-TERM CURRENT USE OF INSULIN (HCC): ICD-10-CM

## 2020-01-06 DIAGNOSIS — I77.9 PERIPHERAL ARTERIAL OCCLUSIVE DISEASE (HCC): ICD-10-CM

## 2020-01-06 PROCEDURE — 99214 OFFICE O/P EST MOD 30 MIN: CPT | Performed by: FAMILY MEDICINE

## 2020-01-06 RX ORDER — RISPERIDONE 0.5 MG/1
0.5 TABLET, FILM COATED ORAL 2 TIMES DAILY
COMMUNITY
Start: 2019-12-23

## 2020-01-06 RX ORDER — TRAZODONE HYDROCHLORIDE 150 MG/1
TABLET ORAL
COMMUNITY
Start: 2019-12-21 | End: 2020-11-09

## 2020-01-06 RX ORDER — BACLOFEN 10 MG/1
TABLET ORAL
Qty: 135 TABLET | Refills: 1 | Status: SHIPPED | OUTPATIENT
Start: 2020-01-06 | End: 2020-03-19

## 2020-01-06 NOTE — TELEPHONE ENCOUNTER
Per admissions [facility] should Fax over clinical and  Demographic information.    First call Gabby Olvera liaison with [facility] [phone]      Left message for Rohit Dunlap to return call to find out what we can do to help Anderson receive these s

## 2020-01-06 NOTE — PROGRESS NOTES
CHIEF COMPLAINT: Patient presents with:  Referral: mehreen requesting referral to [facility] facility for acute rehab   Foot Injury: left leg with brusing, daughter requesting X-ray    HPI:     Lyle Sebastian is a 68year old male presents for referral Atherosclerotic heart disease of native coronary artery without angina pectoris    • Cholecystitis 2/17/2015   • Cognitive communication deficit    • Coronary artery disease involving coronary bypass graft of native heart with unstable angina pectoris [facility] • Heart Disorder Sister    • Diabetes Brother    • Heart Disorder Brother    • No Known Problems Son       Social History: Social History    Tobacco Use      Smoking status: Former Smoker        Packs/day: 0.50        Years: 20.00        Pack years: 8 2 (two) times daily with meals. 180 tablet 1   • atorvastatin 20 MG Oral Tab Take 1 tablet (20 mg total) by mouth nightly.  90 tablet 1   • Acetaminophen 500 MG Oral Cap Take 1 capsule (500 mg total) by mouth every 6 (six) hours as needed for Pain (max use Muscle stiffness, left foot contusion   Pertinent positives and negatives noted in the the HPI.     PHYSICAL EXAM:   /70 (BP Location: Right arm, Patient Position: Sitting, Cuff Size: adult)   Pulse 119   Temp 97.7 °F (36.5 °C) (Oral)   Resp 18   SpO2 Discharged on 11/17/2019   Component Date Value   • POC Glucose 11/17/2019 86    Admission on 10/24/2019, Discharged on 10/24/2019   Component Date Value   • Hold Blue 10/24/2019 Auto Resulted    • Hold Lavender 10/24/2019 Auto Resulted    • Hold Lt Lopez Round Urine Culture 09/30/2019 No Growth at 18-24 hrs. REVIEWED THIS VISIT  ASSESSMENT/PLAN:   68year old male with    1.  left hemiparesis (HCC)  -Increase  baclofen 10 MG Oral Tab; 1.5 tablets every 8 hours. Dispense: 135 tablet;  Refill: 1 to help with immobilization of fractured    Meds This Visit:  Requested Prescriptions     Signed Prescriptions Disp Refills   • baclofen 10 MG Oral Tab 135 tablet 1     Si.5 tablets every 8 hours.        Health Maintenance:  FIT Colorectal Screening due on  left side as late effect of stroke ([facility] [address]     Cholelithiasis     Nontraumatic subcortical hemorrhage of right cerebral hemisphere [facility])     Urinary tract infection associated with indwelling urethral catheter (HCC)     Major depressive disorder     CAD, mul

## 2020-01-06 NOTE — TELEPHONE ENCOUNTER
Spoke with Cary Billings, and she indicated Shelby Groverer  would have bring him in for an apt for an evaluation for overnight acute  rehabilitation. PCP can refer him for outpatient day rehab services and the center.    PCP would have to specify the services ie PT OT [facility]  [facility]

## 2020-01-06 NOTE — TELEPHONE ENCOUNTER
Elnora Bloch spoke with Dr Jeniffer Rapp and would like Nisula transferred to [facility] rehab center. Director of inpatient rehab [phone].   She reports she spoke with [facility] and they would approve for him to go there for rehabilitation    Spoke with, Abraham Morales at [facility]

## 2020-01-06 NOTE — PATIENT INSTRUCTIONS
Foot Sprain-rule out fracture-x-ray ordered. A sprain is a stretching or tearing of the ligaments that hold a joint together. There are usually no broken bones.  Sprains generally take from 3 to 6 weeks to heal. A sprain may be treated with a splint, w · If you were given a splint or cast, keep it dry. Bathe with your splint or cast well out of the water, protected with 2 large plastic bags, sealed with tape or rubber-bands at the top end.  If a fiberglass splint or cast gets wet, you can dry it with a ha

## 2020-01-07 NOTE — TELEPHONE ENCOUNTER
spoke with daughter Angely and she reports she will not be able to get him to [facility] for an apt for an evaluation    She reports she will take him to the emergency room

## 2020-01-08 ENCOUNTER — TELEPHONE (OUTPATIENT)
Dept: FAMILY MEDICINE CLINIC | Facility: CLINIC | Age: 70
End: 2020-01-08

## 2020-01-08 NOTE — TELEPHONE ENCOUNTER
Discharge papers received from Gurvinder aSldana. Family request to discharge patient from home health because they would like to take him to a rehab facility.   Orders signed, faxed and conf rec'd  Copy top scanning

## 2020-01-09 DIAGNOSIS — G81.94 LEFT HEMIPARESIS (HCC): ICD-10-CM

## 2020-01-09 DIAGNOSIS — G57.93 NEUROPATHY INVOLVING BOTH LOWER EXTREMITIES: ICD-10-CM

## 2020-01-09 DIAGNOSIS — Z79.4 TYPE 2 DIABETES MELLITUS WITH DIABETIC POLYNEUROPATHY, WITH LONG-TERM CURRENT USE OF INSULIN (HCC): ICD-10-CM

## 2020-01-09 DIAGNOSIS — E11.42 TYPE 2 DIABETES MELLITUS WITH DIABETIC POLYNEUROPATHY, WITH LONG-TERM CURRENT USE OF INSULIN (HCC): ICD-10-CM

## 2020-01-10 ENCOUNTER — TELEPHONE (OUTPATIENT)
Dept: FAMILY MEDICINE CLINIC | Facility: CLINIC | Age: 70
End: 2020-01-10

## 2020-01-10 RX ORDER — GABAPENTIN 300 MG/1
CAPSULE ORAL
Qty: 180 CAPSULE | Refills: 0 | Status: SHIPPED | OUTPATIENT
Start: 2020-01-10 | End: 2020-03-09

## 2020-01-10 NOTE — TELEPHONE ENCOUNTER
Daughter calling for status. Referral status is approved. Call placed to [facility] for status. They do not show that they received order. Re-faxed to [phone].

## 2020-01-10 NOTE — TELEPHONE ENCOUNTER
Daughter, Emelyn Silva, calling with request for order to continue physical therapy at home with [facility]io[facility]. Please advise on order.

## 2020-01-10 NOTE — TELEPHONE ENCOUNTER
Pt requesting refill of   Requested Prescriptions     Pending Prescriptions Disp Refills   • gabapentin 300 MG Oral Cap [Pharmacy Med Name: GABAPENTIN 300MG CAPSULES] 180 capsule 0     Sig: TAKE 1 CAPSULE BY MOUTH EVERY MORNING AND 2 CAPSULES BY MOUTH EVER

## 2020-01-13 ENCOUNTER — HOSPITAL ENCOUNTER (EMERGENCY)
Facility: HOSPITAL | Age: 70
Discharge: HOME | End: 2020-01-13
Attending: EMERGENCY MEDICINE
Payer: MEDICARE

## 2020-01-13 ENCOUNTER — APPOINTMENT (OUTPATIENT)
Dept: GENERAL RADIOLOGY | Facility: HOSPITAL | Age: 70
End: 2020-01-13
Payer: MEDICARE

## 2020-01-13 ENCOUNTER — TELEPHONE (OUTPATIENT)
Dept: FAMILY MEDICINE CLINIC | Facility: CLINIC | Age: 70
End: 2020-01-13

## 2020-01-13 VITALS
RESPIRATION RATE: 16 BRPM | WEIGHT: 156.5 LBS | DIASTOLIC BLOOD PRESSURE: 67 MMHG | HEART RATE: 82 BPM | SYSTOLIC BLOOD PRESSURE: 126 MMHG | OXYGEN SATURATION: 93 % | BODY MASS INDEX: 25 KG/M2 | TEMPERATURE: 98 F

## 2020-01-13 DIAGNOSIS — S90.32XA CONTUSION OF LEFT FOOT, INITIAL ENCOUNTER: Primary | ICD-10-CM

## 2020-01-13 PROCEDURE — 99283 EMERGENCY DEPT VISIT LOW MDM: CPT

## 2020-01-13 PROCEDURE — 73630 X-RAY EXAM OF FOOT: CPT | Performed by: EMERGENCY MEDICINE

## 2020-01-13 NOTE — ED PROVIDER NOTES
Patient Seen in: [facility] Emergency Department      History   Patient presents with:  Pain    Stated Complaint: Pt c/o pain to left foot, bruising noted.  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

## 2020-01-13 NOTE — TELEPHONE ENCOUNTER
Juan Kruse with [facility] called stating they need an order for an HONEY and they need more documentation, chantell states that they will fax the form that needs to be signed filled out and dated by Dr. Pb Colindres.  If any questions please call [phone]

## 2020-01-13 NOTE — ED INITIAL ASSESSMENT (HPI)
Pt c/o pain to left foot, bruising and abrasion noted. Per EMS no injury there were compression socks on too long per family.

## 2020-01-13 NOTE — ED NOTES
Caregiver now at bedside stating that patient's foot was caught underneath his electric wheelchair this weekend.

## 2020-01-14 NOTE — TELEPHONE ENCOUNTER
Fabby Gonzales took patient to ED for foot xray and assessment  She reports the  [facility] [facility]   Would [facility]    [employer] information and phone number for Nonah Peabody   She is to call and make an apt for an evaluation  Verbalizes dakota

## 2020-01-14 NOTE — TELEPHONE ENCOUNTER
Dawn Tejada with [facility] called stating that they received the demo form but still need order form it was refaxed and It needs to be signed and dated and also middle section questions 1-7 need to be filled out, chantell stated that each item that appl

## 2020-01-14 NOTE — ED PROVIDER NOTES
Patient Seen in: [facility] Emergency Department      History   Patient presents with:  Pain    Stated Complaint: Pt c/o pain to left foot, bruising noted.  Per EMS no injury there were compress*    HPI    CHIEF COMPLAINT: Left foot injury     HISTOR nurse's notes are reviewed and agree. The patient's family history is reviewed and is noncontributory to the presenting problem, except as indicated as above.     Past Medical History:   Diagnosis Date   • AAA (abdominal aortic aneurysm) without rupture (HC ANGIOPLASTY (CORONARY)     • CABG     • CATH DRUG ELUTING STENT     • CYSTOSCOPY URETEROSCOPY N/A 9/14/2019    Performed by Abigail Nguyễn MD at [address]   • LAPAROSCOPY, SURGICAL PROSTATECTOMY, RETROPUBIC RADICAL, W/NERVE SPARING     • OTHER  2011    q Complete (min 3 Views), Left (cpt=73630)    Result Date: 1/13/2020  PROCEDURE:  XR FOOT, COMPLETE (MIN 3 VIEWS), LEFT (CPT=73630)  TECHNIQUE:  AP, oblique, and lateral views were obtained. COMPARISON:  None.   INDICATIONS:  Pt c/o pain to left foot, bruisi diagnosis)    Disposition:  Discharge  1/13/2020  6:07 pm    Follow-up:  Rebeca Marie, О[place]а [place] [address] [address] [phone]    In 1 week  for follow up        Medications Prescribed:  Discharge Medication List as of 1/13/2020  6:08 PM

## 2020-01-14 NOTE — TELEPHONE ENCOUNTER
( cont from TE 12/30/2019)  Spoke with Drake Hidalgo  They are requesting :  Patient Ht and Wt  LOV  Insurance card   Demographics     Forms all faxed conf rec'd

## 2020-01-14 NOTE — TELEPHONE ENCOUNTER
Cannot initiate physical therapy if patient has a fractured left foot. Patient had large contusion that was old on his paralyzed foot at 1/6/2020 and daughter Maddie's side was caught in the left at [place]? .  Patient has no sensation to touch so difficul

## 2020-01-14 NOTE — TELEPHONE ENCOUNTER
Spoke with Viktoria Boyce at [facility] [place] . Daughter would need to call [phone] for patient to make an apt with a kamari cotton doctor for an evaluation for inpatient rehabilitation and treatment.

## 2020-01-22 DIAGNOSIS — E11.40 DM TYPE 2, UNCONTROLLED, WITH NEUROPATHY (HCC): ICD-10-CM

## 2020-01-22 DIAGNOSIS — E11.65 DM TYPE 2, UNCONTROLLED, WITH NEUROPATHY (HCC): ICD-10-CM

## 2020-01-22 RX ORDER — BLOOD SUGAR DIAGNOSTIC
STRIP MISCELLANEOUS
Qty: 100 STRIP | Refills: 11 | Status: SHIPPED | OUTPATIENT
Start: 2020-01-22

## 2020-01-24 NOTE — CM/SW NOTE
Spoke with daughter at length about options for her father. Per daughter, father is having increased weakness and increased falls. He does have a caregiver that is provided during the day.     She brought her father to the ER for hopes that he go back to ac

## 2020-02-11 DIAGNOSIS — F33.1 MODERATE EPISODE OF RECURRENT MAJOR DEPRESSIVE DISORDER (HCC): ICD-10-CM

## 2020-02-12 RX ORDER — TRAZODONE HYDROCHLORIDE 150 MG/1
TABLET ORAL
Qty: 90 TABLET | Refills: 0 | OUTPATIENT
Start: 2020-02-12

## 2020-02-12 RX ORDER — LIDOCAINE 50 MG/G
PATCH TOPICAL
Status: ON HOLD | COMMUNITY
Start: 2020-01-10 | End: 2020-09-14

## 2020-02-12 NOTE — TELEPHONE ENCOUNTER
Pt requesting refill of   Requested Prescriptions     Pending Prescriptions Disp Refills   • traZODone HCl 150 MG Oral Tab  0   No protocol available, routed to provider to review medication request:     Last Time Medication was Filled:  dose increased to

## 2020-02-13 NOTE — TELEPHONE ENCOUNTER
Trazodone needs to be refilled by his psychiatrist.  Michel Menard denied. They are managing and monitoring his psych medications. Please inform his daughter.

## 2020-02-13 NOTE — TELEPHONE ENCOUNTER
Spoke with Anamaria Meryl, to have medication filled by psychiatry  Patient verbalizes understanding.

## 2020-02-18 ENCOUNTER — TELEPHONE (OUTPATIENT)
Dept: FAMILY MEDICINE CLINIC | Facility: CLINIC | Age: 70
End: 2020-02-18

## 2020-02-18 DIAGNOSIS — I25.10 CAD, MULTIPLE VESSEL: ICD-10-CM

## 2020-02-18 DIAGNOSIS — I77.9 PERIPHERAL ARTERIAL OCCLUSIVE DISEASE (HCC): ICD-10-CM

## 2020-02-18 DIAGNOSIS — I73.9 PERIPHERAL VASCULAR DISEASE (HCC): ICD-10-CM

## 2020-02-18 DIAGNOSIS — Z95.5 S/P CORONARY ARTERY STENT PLACEMENT: ICD-10-CM

## 2020-02-18 DIAGNOSIS — I10 ESSENTIAL HYPERTENSION, BENIGN: ICD-10-CM

## 2020-02-18 NOTE — TELEPHONE ENCOUNTER
Pts daughter Narciso Garay who is on FYI, Is calling because pt has been coughing a lot as well as complaining that anything he eats will not pass, Narciso Garay is afraid that it can be pneumonia and wants to talk to a nurse to see what they should do if they should take p

## 2020-02-18 NOTE — TELEPHONE ENCOUNTER
[facility] called to report patient  Received 2 breathing treatment 1 last night and 1 today.  Gave him breathing treatment because he was coughing    Maddie reports \"It seems Every time he goes to swallow food , he starts coughing  Coughing started last week and

## 2020-02-19 RX ORDER — CLOPIDOGREL BISULFATE 75 MG/1
TABLET ORAL
Qty: 90 TABLET | Refills: 1 | Status: SHIPPED | OUTPATIENT
Start: 2020-02-19

## 2020-02-19 RX ORDER — AMLODIPINE BESYLATE 5 MG/1
TABLET ORAL
Qty: 90 TABLET | Refills: 0 | Status: SHIPPED | OUTPATIENT
Start: 2020-02-19 | End: 2020-04-20

## 2020-02-19 NOTE — TELEPHONE ENCOUNTER
Pt requesting refill of   Requested Prescriptions     Pending Prescriptions Disp Refills   • CLOPIDOGREL BISULFATE 75 MG Oral Tab [Pharmacy Med Name: CLOPIDOGREL 75MG TABLETS] 90 tablet 1     Sig: TAKE 1 TABLET(75 MG) BY MOUTH DAILY     Signed Prescription

## 2020-02-20 NOTE — TELEPHONE ENCOUNTER
Patient has a history of GERD and no current medication is active on his chart. Please verify if patient is taking omeprazole over-the-counter or famotidine as previously prescribed.   If food is having a hard time passing, then he may have some dysphasia

## 2020-02-20 NOTE — TELEPHONE ENCOUNTER
LMOM to return call to the office. Provided pt office phone [phone] along with office hours given.

## 2020-02-20 NOTE — TELEPHONE ENCOUNTER
Pt needs annual visit with cardiologist. Given referral to . further refills of plavix by cardiology. Please call daughter. Thank you.

## 2020-02-24 DIAGNOSIS — Z79.4 TYPE 2 DIABETES MELLITUS WITH DIABETIC POLYNEUROPATHY, WITH LONG-TERM CURRENT USE OF INSULIN (HCC): ICD-10-CM

## 2020-02-24 DIAGNOSIS — E11.42 TYPE 2 DIABETES MELLITUS WITH DIABETIC POLYNEUROPATHY, WITH LONG-TERM CURRENT USE OF INSULIN (HCC): ICD-10-CM

## 2020-02-24 RX ORDER — METFORMIN HYDROCHLORIDE 750 MG/1
TABLET, EXTENDED RELEASE ORAL
Qty: 180 TABLET | Refills: 1 | Status: SHIPPED | OUTPATIENT
Start: 2020-02-24 | End: 2020-06-15

## 2020-02-24 RX ORDER — METFORMIN HYDROCHLORIDE 750 MG/1
TABLET, EXTENDED RELEASE ORAL
Qty: 180 TABLET | Refills: 1 | OUTPATIENT
Start: 2020-02-24

## 2020-02-24 RX ORDER — TAMSULOSIN HYDROCHLORIDE 0.4 MG/1
CAPSULE ORAL
Qty: 90 CAPSULE | Refills: 1 | Status: SHIPPED | OUTPATIENT
Start: 2020-02-24 | End: 2020-06-15

## 2020-02-24 RX ORDER — TAMSULOSIN HYDROCHLORIDE 0.4 MG/1
CAPSULE ORAL
Qty: 90 CAPSULE | Refills: 1 | OUTPATIENT
Start: 2020-02-24

## 2020-02-24 NOTE — TELEPHONE ENCOUNTER
LMOM to return call to the office. Provided pt office phone [phone] along with office hours given.

## 2020-02-24 NOTE — TELEPHONE ENCOUNTER
LMOM to return call to the office. Provided pt office phone [phone] along with office hours given.

## 2020-02-24 NOTE — TELEPHONE ENCOUNTER
Pt requesting refill of   Requested Prescriptions     Pending Prescriptions Disp Refills   • tamsulosin (FLOMAX) cap [Pharmacy Med Name: TAMSULOSIN 0.4MG CAPSULES] 90 capsule 1     Sig: TAKE 1 CAPSULE(0.4 MG) BY MOUTH DAILY   • METFORMIN HCL  MG Oral

## 2020-02-26 NOTE — TELEPHONE ENCOUNTER
LMOM to return call to the office as this was my 3rd and final attempt to try to reach you. Provided pt office phone [phone] along with office hours given. Mailed patient a letter to contact office. Closing encounter.

## 2020-03-03 NOTE — TELEPHONE ENCOUNTER
Detailed message left on  Maddie cell patient needs to follow up with cardiology and get his refills from there.

## 2020-03-07 DIAGNOSIS — E11.42 TYPE 2 DIABETES MELLITUS WITH DIABETIC POLYNEUROPATHY, WITH LONG-TERM CURRENT USE OF INSULIN (HCC): ICD-10-CM

## 2020-03-07 DIAGNOSIS — G57.93 NEUROPATHY INVOLVING BOTH LOWER EXTREMITIES: ICD-10-CM

## 2020-03-07 DIAGNOSIS — Z79.4 TYPE 2 DIABETES MELLITUS WITH DIABETIC POLYNEUROPATHY, WITH LONG-TERM CURRENT USE OF INSULIN (HCC): ICD-10-CM

## 2020-03-07 DIAGNOSIS — G81.94 LEFT HEMIPARESIS (HCC): ICD-10-CM

## 2020-03-09 RX ORDER — GABAPENTIN 300 MG/1
CAPSULE ORAL
Qty: 270 CAPSULE | Refills: 0 | Status: SHIPPED | OUTPATIENT
Start: 2020-03-09 | End: 2020-06-12

## 2020-03-09 NOTE — TELEPHONE ENCOUNTER
Pt requesting refill of   Requested Prescriptions     Pending Prescriptions Disp Refills   • gabapentin 300 MG Oral Cap [Pharmacy Med Name: GABAPENTIN 300MG CAPSULES] 180 capsule 0     Sig: TAKE ONE CAPSULE BY MOUTH EVERY MORNING AND 2 CAPSULE BY MOUTH SERENA

## 2020-03-13 ENCOUNTER — TELEPHONE (OUTPATIENT)
Dept: FAMILY MEDICINE CLINIC | Facility: CLINIC | Age: 70
End: 2020-03-13

## 2020-03-13 NOTE — TELEPHONE ENCOUNTER
Patient's daughter Aurea Ortiz) called office stating that her dad needs a refill for his medication, but womack snot want to leave the house due to the Coronavirus. She stated that he is crying all the time and thinks he is going to die.      Please call daughter Aurea Ortiz) at [phone]

## 2020-03-13 NOTE — TELEPHONE ENCOUNTER
Wallace Yu, daughter, called requesting a refill on risperidone. Repprts Pb Hebert is feeling more depressed.   The psychiatrist will not fill the prescriptions because patient is due for an apt    Last seen the psychiatrist in February    Informed Wallace Yu Dr does not typcially prescribe medications that are being prescribed by another specialist that the patient is required to follow up with    Wallace Yu is not going to risk taking the chance to take him out  Patient was offered a vide visit and Wallace Yu reports that is not covered with the insurance plan  Explained to patient there are specific guidelines in place in offices to protect individuals, however, she will not take Pb Hebert out of the house     When she gets home she will update us with the psychiatrist name and number to add to specialist list

## 2020-03-13 NOTE — TELEPHONE ENCOUNTER
[phone] Dr Velia Hammans is psychiatry    Beth Neighbours called back to report they will be able to complete a video office visit with Dr Rashaad Bueno- psychiatry and they will refill his medications

## 2020-03-19 DIAGNOSIS — S43.005D DISLOCATION OF LEFT SHOULDER JOINT, SUBSEQUENT ENCOUNTER: ICD-10-CM

## 2020-03-19 DIAGNOSIS — G81.94 LEFT HEMIPARESIS (HCC): ICD-10-CM

## 2020-03-19 RX ORDER — BACLOFEN 10 MG/1
TABLET ORAL
Qty: 135 TABLET | Refills: 1 | Status: SHIPPED | OUTPATIENT
Start: 2020-03-19 | End: 2020-05-14

## 2020-03-19 NOTE — TELEPHONE ENCOUNTER
Pt requesting refill of   Requested Prescriptions     Pending Prescriptions Disp Refills   • baclofen 10 MG Oral Tab [Pharmacy Med Name: BACLOFEN 10MG TABLETS] 135 tablet 1     Sig: TAKE 1 1/2 TABLETS BY MOUTH EVERY 8 HOURS          No protocol available,

## 2020-04-13 DIAGNOSIS — I77.9 PERIPHERAL ARTERIAL OCCLUSIVE DISEASE (HCC): ICD-10-CM

## 2020-04-13 DIAGNOSIS — I73.9 PERIPHERAL VASCULAR DISEASE (HCC): ICD-10-CM

## 2020-04-13 DIAGNOSIS — I71.4 ABDOMINAL AORTIC ANEURYSM (AAA) WITHOUT RUPTURE (HCC): ICD-10-CM

## 2020-04-13 DIAGNOSIS — E78.2 MIXED HYPERLIPIDEMIA: ICD-10-CM

## 2020-04-13 RX ORDER — ATORVASTATIN CALCIUM 20 MG/1
TABLET, FILM COATED ORAL
Qty: 90 TABLET | Refills: 1 | Status: SHIPPED | OUTPATIENT
Start: 2020-04-13 | End: 2020-06-15

## 2020-04-13 NOTE — TELEPHONE ENCOUNTER
Pt requesting refill of   Requested Prescriptions     Pending Prescriptions Disp Refills   • ATORVASTATIN 20 MG Oral Tab [Pharmacy Med Name: ATORVASTATIN 20MG TABLETS] 90 tablet 1     Sig: TAKE 1 TABLET(20 MG) BY MOUTH NIGHTLY     Refill protocol failed

## 2020-04-19 DIAGNOSIS — I10 ESSENTIAL HYPERTENSION, BENIGN: ICD-10-CM

## 2020-04-20 RX ORDER — AMLODIPINE BESYLATE 5 MG/1
TABLET ORAL
Qty: 90 TABLET | Refills: 0 | Status: SHIPPED | OUTPATIENT
Start: 2020-04-20 | End: 2020-06-15

## 2020-04-27 ENCOUNTER — TELEPHONE (OUTPATIENT)
Dept: FAMILY MEDICINE CLINIC | Facility: CLINIC | Age: 70
End: 2020-04-27

## 2020-04-27 NOTE — TELEPHONE ENCOUNTER
Yeimi Carbajal reports Real Raphael has been coughing. He has a dry cough x 1 weeks. Coughs more in the day but not at night.  He has been taking DayQuil which has helped  Denies fever, body aches,  Eating OK and is fatiges    Discussed if it seems more like he is coughi

## 2020-04-28 ENCOUNTER — VIRTUAL PHONE E/M (OUTPATIENT)
Dept: FAMILY MEDICINE CLINIC | Facility: CLINIC | Age: 70
End: 2020-04-28
Payer: MEDICARE

## 2020-04-28 DIAGNOSIS — I62.9 INTRACRANIAL HEMORRHAGE (HCC): ICD-10-CM

## 2020-04-28 DIAGNOSIS — K59.01 SLOW TRANSIT CONSTIPATION: ICD-10-CM

## 2020-04-28 DIAGNOSIS — Z87.898 HISTORY OF SEIZURE: Primary | ICD-10-CM

## 2020-04-28 DIAGNOSIS — K21.9 GASTROESOPHAGEAL REFLUX DISEASE, ESOPHAGITIS PRESENCE NOT SPECIFIED: ICD-10-CM

## 2020-04-28 DIAGNOSIS — R05.8 DRY COUGH: ICD-10-CM

## 2020-04-28 DIAGNOSIS — G81.94 LEFT HEMIPARESIS (HCC): ICD-10-CM

## 2020-04-28 PROCEDURE — 99443 PHONE E/M BY PHYS 21-30 MIN: CPT | Performed by: FAMILY MEDICINE

## 2020-04-28 RX ORDER — ASPIRIN 81 MG
100 TABLET, DELAYED RELEASE (ENTERIC COATED) ORAL 2 TIMES DAILY PRN
Qty: 60 TABLET | Refills: 0 | COMMUNITY
Start: 2020-04-28 | End: 2021-01-26

## 2020-04-28 RX ORDER — FAMOTIDINE 20 MG/1
20 TABLET ORAL 2 TIMES DAILY
Qty: 180 TABLET | Refills: 0 | Status: SHIPPED | OUTPATIENT
Start: 2020-04-28 | End: 2020-06-15

## 2020-04-28 NOTE — PATIENT INSTRUCTIONS
Consejos para controlar el reflujo de ácido (agruras)    Para controlar el reflujo de ácido es necesario hacer algunos cambios básicos en la alimentación y el estilo de sandra. Los siguientes consejos pueden ser suficientes para aliviar el malestar.   Mcneal El esófago es un tubo que lleva la comida desde la boca al BJURHOLM. En noel extremo inferior, tiene mandy válvula (el esfínter esofágico inferior; LES, por doc siglas en inglés) que edith que suban los ácidos del BJURHOLM.  Si esta válvula no funciona correcta · Trate de comer en forma moderada, en especial, por la noche. [place] en cantidades pequeñas y con frecuencia es mejor. No se acueste inmediatamente después de comer. No coma nada trey las últimas 3 horas antes de irse a dormir.   · Evite el cigarrillo y Llame a noel proveedor de atención médica ante cualquiera de los siguientes síntomas:  · Dolor estomacal que empeora o se extiende a la parte inferior derecha del abdomen (área del apéndice)  · Aparece el dolor de pecho o empeora, o se esparce hacia la espal · Fresh fruits (especially apples, pears, and dried fruits like raisins and apricots)  · Nuts and legumes (especially beans such as lentils, kidney beans, and lima beans)  Get physically active  Exercise helps improve the working of your colon which helps If you are confident that your benefit plan will not require a referral or authorization, such as [place] Medicaid, please feel free to schedule your appointment immediately.  However, if you are unsure about the requirements for authorization, please wait A cough that is worse at night may be due to postnasal drip. Excess mucus in the nose drains from the back of your nose to your throat. This triggers the cough reflex. Postnasal drip may be due to a sinus infection or allergy.  Common allergens include dust The esophagus is the tube that carries food from the mouth to the stomach. A valve at its lower end prevents stomach acids from flowing upward. If this valve does not work properly, acid from the stomach enters the esophagus.  This may cause a burning pain

## 2020-04-28 NOTE — PROGRESS NOTES
Due to COVID-19 ACTION PLAN, the patient's office visit was converted to a phone. Attempted video and pt's daughter unable to download and follow video procedure and daughter requested phone call.      Time Spent: 33 mins    Subjective     HPI:   [facility] low 100s. Bowel movements have been every day every other day then today had an explosive non bloody diarrhea-light brown-1x today passed pill.   Caregiver states Jackie Velez stopped MiraLAX because he was having a few bowel movements a day but then received wheezing or new symptoms call office    Diarrhea/Constipation  Only 1 episode diarrhea  Has history of chronic constipation  Difficult to determine if becoming constipated off MiraLAX or other cause.   Patient has no vomiting or fever and stool was brown  I

## 2020-04-30 ENCOUNTER — TELEPHONE (OUTPATIENT)
Dept: FAMILY MEDICINE CLINIC | Facility: CLINIC | Age: 70
End: 2020-04-30

## 2020-04-30 NOTE — TELEPHONE ENCOUNTER
Patient should continue with nebulizers for coughing if chest tightness is not relieved and having chest pain then he would need to go to the emergency room.     If he develops shortness of breath not relieved with albuterol or using his albuterol treatment

## 2020-04-30 NOTE — TELEPHONE ENCOUNTER
Joaquina Carmita reports Corine Melvin has chest pain when he coughs. Cough is becoming more \"wet\"  Breathing is a little faster but is not having a hard time breathing.  He just had a breathing treatment  Denies temp  Reports cough still occurs more after eating  Ordered t

## 2020-04-30 NOTE — TELEPHONE ENCOUNTER
Maddie notified of below instructions  Thick it today and she will start it today.   Chest pain is resolved  Verbalizes understanding to call 911 if he develops chest pain or SOB does not improve

## 2020-05-07 ENCOUNTER — TELEPHONE (OUTPATIENT)
Dept: FAMILY MEDICINE CLINIC | Facility: CLINIC | Age: 70
End: 2020-05-07

## 2020-05-07 NOTE — TELEPHONE ENCOUNTER
Pts daughter who is on FYI, called stating on patients (L) side there is a blister she is concerned about. And also wants to know if since pts diabetes is under control they want to know if they can lower one of his medication.

## 2020-05-08 ENCOUNTER — VIRTUAL PHONE E/M (OUTPATIENT)
Dept: FAMILY MEDICINE CLINIC | Facility: CLINIC | Age: 70
End: 2020-05-08
Payer: MEDICARE

## 2020-05-08 DIAGNOSIS — K21.9 GASTROESOPHAGEAL REFLUX DISEASE, ESOPHAGITIS PRESENCE NOT SPECIFIED: ICD-10-CM

## 2020-05-08 DIAGNOSIS — I69.354 HEMIPARESIS AFFECTING LEFT SIDE AS LATE EFFECT OF STROKE (HCC): ICD-10-CM

## 2020-05-08 DIAGNOSIS — I62.9 INTRACRANIAL HEMORRHAGE (HCC): ICD-10-CM

## 2020-05-08 DIAGNOSIS — E78.2 MIXED HYPERLIPIDEMIA: ICD-10-CM

## 2020-05-08 DIAGNOSIS — G81.94 LEFT HEMIPARESIS (HCC): ICD-10-CM

## 2020-05-08 DIAGNOSIS — I73.9 PVD (PERIPHERAL VASCULAR DISEASE) (HCC): ICD-10-CM

## 2020-05-08 DIAGNOSIS — S20.329A: Primary | ICD-10-CM

## 2020-05-08 DIAGNOSIS — E11.42 TYPE 2 DIABETES MELLITUS WITH DIABETIC POLYNEUROPATHY, WITH LONG-TERM CURRENT USE OF INSULIN (HCC): ICD-10-CM

## 2020-05-08 DIAGNOSIS — I61.0 NONTRAUMATIC SUBCORTICAL HEMORRHAGE OF RIGHT CEREBRAL HEMISPHERE (HCC): ICD-10-CM

## 2020-05-08 DIAGNOSIS — Z79.4 TYPE 2 DIABETES MELLITUS WITH DIABETIC POLYNEUROPATHY, WITH LONG-TERM CURRENT USE OF INSULIN (HCC): ICD-10-CM

## 2020-05-08 DIAGNOSIS — D50.0 IRON DEFICIENCY ANEMIA DUE TO CHRONIC BLOOD LOSS: ICD-10-CM

## 2020-05-08 PROCEDURE — 99442 PHONE E/M BY PHYS 11-20 MIN: CPT | Performed by: FAMILY MEDICINE

## 2020-05-08 RX ORDER — FERROUS SULFATE TAB EC 324 MG (65 MG FE EQUIVALENT) 324 (65 FE) MG
1 TABLET DELAYED RESPONSE ORAL DAILY
Qty: 90 TABLET | Refills: 0 | Status: SHIPPED | OUTPATIENT
Start: 2020-05-08 | End: 2020-06-07

## 2020-05-08 NOTE — PROGRESS NOTES
Due to COVID-19 ACTION PLAN, the patient's office visit was converted to a phone visit  Time Spent: 11.44 mins    Subjective     HPI:   Amari Panda is a 68year old male who presents for daughter, Vida Cariass of  noticed a couple of days ago a b Physical Exam:  Patient sleeping comfortably daughter provided photos and history.             Admission on 11/17/2019, Discharged on 11/17/2019   Component Date Value   • POC Glucose 11/17/2019 86              Assessment    Diagnoses and all orders for [facility] Continue famotidine and Thick-It  Restart iron sulfate daily due to history of GI bleeding/AVM       Return for After labs complete and schedule annual wellness.     DO Ramiro Andrade power of  understands phone evaluati

## 2020-05-13 DIAGNOSIS — G81.94 LEFT HEMIPARESIS (HCC): ICD-10-CM

## 2020-05-13 DIAGNOSIS — S43.005D DISLOCATION OF LEFT SHOULDER JOINT, SUBSEQUENT ENCOUNTER: ICD-10-CM

## 2020-05-14 RX ORDER — BACLOFEN 10 MG/1
TABLET ORAL
Qty: 135 TABLET | Refills: 1 | Status: SHIPPED | OUTPATIENT
Start: 2020-05-14 | End: 2020-06-15

## 2020-05-21 ENCOUNTER — PATIENT OUTREACH (OUTPATIENT)
Dept: FAMILY MEDICINE CLINIC | Facility: CLINIC | Age: 70
End: 2020-05-21

## 2020-05-21 NOTE — PROGRESS NOTES
LMOM for patients daughter Dustin Beckett who is on FYI, to call office back to schedule MA Supervisit.

## 2020-06-09 DIAGNOSIS — G57.93 NEUROPATHY INVOLVING BOTH LOWER EXTREMITIES: ICD-10-CM

## 2020-06-09 DIAGNOSIS — E11.42 TYPE 2 DIABETES MELLITUS WITH DIABETIC POLYNEUROPATHY, WITH LONG-TERM CURRENT USE OF INSULIN (HCC): ICD-10-CM

## 2020-06-09 DIAGNOSIS — Z79.4 TYPE 2 DIABETES MELLITUS WITH DIABETIC POLYNEUROPATHY, WITH LONG-TERM CURRENT USE OF INSULIN (HCC): ICD-10-CM

## 2020-06-09 DIAGNOSIS — G81.94 LEFT HEMIPARESIS (HCC): ICD-10-CM

## 2020-06-10 NOTE — TELEPHONE ENCOUNTER
Pt requesting refill of   Requested Prescriptions     Pending Prescriptions Disp Refills   • gabapentin 300 MG Oral Cap [Pharmacy Med Name: GABAPENTIN 300MG CAPSULES] 270 capsule 0     Sig: TAKE 1 CAPSULE BY MOUTH EVERY MORNING AND 2 CAPSULES BY MOUTH EVER

## 2020-06-12 RX ORDER — GABAPENTIN 300 MG/1
CAPSULE ORAL
Qty: 270 CAPSULE | Refills: 0 | Status: SHIPPED | OUTPATIENT
Start: 2020-06-12 | End: 2020-10-21

## 2020-06-15 ENCOUNTER — PATIENT MESSAGE (OUTPATIENT)
Dept: FAMILY MEDICINE CLINIC | Facility: CLINIC | Age: 70
End: 2020-06-15

## 2020-06-15 DIAGNOSIS — K21.9 GASTROESOPHAGEAL REFLUX DISEASE, ESOPHAGITIS PRESENCE NOT SPECIFIED: ICD-10-CM

## 2020-06-15 DIAGNOSIS — E78.2 MIXED HYPERLIPIDEMIA: ICD-10-CM

## 2020-06-15 DIAGNOSIS — I73.9 PERIPHERAL VASCULAR DISEASE (HCC): ICD-10-CM

## 2020-06-15 DIAGNOSIS — S43.005D DISLOCATION OF LEFT SHOULDER JOINT, SUBSEQUENT ENCOUNTER: ICD-10-CM

## 2020-06-15 DIAGNOSIS — I10 ESSENTIAL HYPERTENSION, BENIGN: ICD-10-CM

## 2020-06-15 DIAGNOSIS — I77.9 PERIPHERAL ARTERIAL OCCLUSIVE DISEASE (HCC): ICD-10-CM

## 2020-06-15 DIAGNOSIS — I71.4 ABDOMINAL AORTIC ANEURYSM (AAA) WITHOUT RUPTURE (HCC): ICD-10-CM

## 2020-06-15 DIAGNOSIS — Z79.4 TYPE 2 DIABETES MELLITUS WITH DIABETIC POLYNEUROPATHY, WITH LONG-TERM CURRENT USE OF INSULIN (HCC): ICD-10-CM

## 2020-06-15 DIAGNOSIS — E11.42 TYPE 2 DIABETES MELLITUS WITH DIABETIC POLYNEUROPATHY, WITH LONG-TERM CURRENT USE OF INSULIN (HCC): ICD-10-CM

## 2020-06-15 DIAGNOSIS — F33.1 MODERATE EPISODE OF RECURRENT MAJOR DEPRESSIVE DISORDER (HCC): ICD-10-CM

## 2020-06-15 DIAGNOSIS — G81.94 LEFT HEMIPARESIS (HCC): ICD-10-CM

## 2020-06-15 RX ORDER — BACLOFEN 10 MG/1
15 TABLET ORAL EVERY 8 HOURS
Qty: 135 TABLET | Refills: 0 | Status: SHIPPED | OUTPATIENT
Start: 2020-06-15 | End: 2020-07-07

## 2020-06-15 RX ORDER — ATORVASTATIN CALCIUM 20 MG/1
20 TABLET, FILM COATED ORAL NIGHTLY
Qty: 90 TABLET | Refills: 0 | Status: SHIPPED | OUTPATIENT
Start: 2020-06-15 | End: 2020-10-09

## 2020-06-15 RX ORDER — SERTRALINE HYDROCHLORIDE 100 MG/1
200 TABLET, FILM COATED ORAL DAILY
Qty: 180 TABLET | Refills: 1 | Status: SHIPPED | OUTPATIENT
Start: 2020-06-15 | End: 2020-11-09 | Stop reason: DRUGHIGH

## 2020-06-15 RX ORDER — TAMSULOSIN HYDROCHLORIDE 0.4 MG/1
0.4 CAPSULE ORAL DAILY
Qty: 90 CAPSULE | Refills: 1 | Status: SHIPPED | OUTPATIENT
Start: 2020-06-15 | End: 2020-12-10

## 2020-06-15 RX ORDER — METFORMIN HYDROCHLORIDE 750 MG/1
750 TABLET, EXTENDED RELEASE ORAL 2 TIMES DAILY WITH MEALS
Qty: 180 TABLET | Refills: 1 | Status: SHIPPED | OUTPATIENT
Start: 2020-06-15 | End: 2021-01-26

## 2020-06-15 RX ORDER — FAMOTIDINE 20 MG/1
20 TABLET ORAL 2 TIMES DAILY
Qty: 180 TABLET | Refills: 0 | Status: SHIPPED | OUTPATIENT
Start: 2020-06-15 | End: 2020-09-17 | Stop reason: ALTCHOICE

## 2020-06-15 RX ORDER — AMLODIPINE BESYLATE 5 MG/1
5 TABLET ORAL DAILY
Qty: 90 TABLET | Refills: 0 | Status: SHIPPED | OUTPATIENT
Start: 2020-06-15 | End: 2021-01-26

## 2020-06-15 NOTE — TELEPHONE ENCOUNTER
LMOM to return call to the office. Provided pt office phone [phone] along with office hours given.     Left message to remind Elnora Bloch to get lab work complete and make an apt

## 2020-06-15 NOTE — TELEPHONE ENCOUNTER
Pt requesting refill of   Requested Prescriptions     Signed Prescriptions Disp Refills   • baclofen 10 MG Oral Tab 135 tablet 0     Sig: Take 1.5 tablets (15 mg total) by mouth every 8 (eight) hours.      Authorizing Provider: Jahaira Mathew

## 2020-06-15 NOTE — TELEPHONE ENCOUNTER
Pt requesting refill of sertaline, tamsulosin and metformin, famotadine to mail order         No protocol available, routed to provider to review medication request:     Last Time Medication was Filled: 8/15/2019 2/24/2020, 2/24/2020, 4/28/2020    Last Off

## 2020-06-29 ENCOUNTER — TELEPHONE (OUTPATIENT)
Dept: FAMILY MEDICINE CLINIC | Facility: CLINIC | Age: 70
End: 2020-06-29

## 2020-06-29 NOTE — TELEPHONE ENCOUNTER
Mustapha from [facility] called asking if he could get some order for speech evaluation for this patient.   Please call him at [phone]

## 2020-06-29 NOTE — TELEPHONE ENCOUNTER
Home Health RN reports his swallowing is getting worse, aspirating liquids. Has been thickening liquids. Hx of stroke. Please advise on speech therapy order.

## 2020-06-30 ENCOUNTER — TELEPHONE (OUTPATIENT)
Dept: FAMILY MEDICINE CLINIC | Facility: CLINIC | Age: 70
End: 2020-06-30

## 2020-06-30 NOTE — TELEPHONE ENCOUNTER
Spoke with [employer]  Requested an order to be faxed to the office however Pasquale Ambrocio said he can take a verbal order for bedside speech evaluation  Verbal order provided

## 2020-06-30 NOTE — TELEPHONE ENCOUNTER
Received orders from [facility] with medcation update and speech pathology eval and treat  Orders signed and faxed, conf rec'd  Copy to scanning

## 2020-07-06 DIAGNOSIS — G81.94 LEFT HEMIPARESIS (HCC): ICD-10-CM

## 2020-07-06 DIAGNOSIS — S43.005D DISLOCATION OF LEFT SHOULDER JOINT, SUBSEQUENT ENCOUNTER: ICD-10-CM

## 2020-07-07 RX ORDER — BACLOFEN 10 MG/1
TABLET ORAL
Qty: 135 TABLET | Refills: 0 | Status: SHIPPED | OUTPATIENT
Start: 2020-07-07

## 2020-07-07 NOTE — TELEPHONE ENCOUNTER
Pt requesting refill of .ref       No protocol available, routed to provider to review medication request:     Last Time Medication was Filled:  6/15/2020    Last Office Visit with Provider: 5/8/2020    Appt scheduled on 7/13/2020

## 2020-07-13 ENCOUNTER — TELEPHONE (OUTPATIENT)
Dept: FAMILY MEDICINE CLINIC | Facility: CLINIC | Age: 70
End: 2020-07-13

## 2020-07-13 NOTE — TELEPHONE ENCOUNTER
Patient's daughter calling requesting physician's statement to be faxed to ambulance service, certifying medical necessity. Advised that ambulance service will need to fax form to office. We will complete and return to them.

## 2020-07-13 NOTE — TELEPHONE ENCOUNTER
Received Plan of care from [address] [place] [address] period 6/29/2020-8/27/2020  Reviewed and signed by PCP  Faxed to [facility], conf rec'd  Copy to scanning

## 2020-07-18 DIAGNOSIS — I25.10 CAD, MULTIPLE VESSEL: ICD-10-CM

## 2020-07-18 DIAGNOSIS — Z95.5 S/P CORONARY ARTERY STENT PLACEMENT: ICD-10-CM

## 2020-07-18 DIAGNOSIS — E11.42 TYPE 2 DIABETES MELLITUS WITH DIABETIC POLYNEUROPATHY, WITH LONG-TERM CURRENT USE OF INSULIN (HCC): ICD-10-CM

## 2020-07-18 DIAGNOSIS — I73.9 PERIPHERAL VASCULAR DISEASE (HCC): ICD-10-CM

## 2020-07-18 DIAGNOSIS — I77.9 PERIPHERAL ARTERIAL OCCLUSIVE DISEASE (HCC): ICD-10-CM

## 2020-07-18 DIAGNOSIS — Z79.4 TYPE 2 DIABETES MELLITUS WITH DIABETIC POLYNEUROPATHY, WITH LONG-TERM CURRENT USE OF INSULIN (HCC): ICD-10-CM

## 2020-07-18 DIAGNOSIS — I10 ESSENTIAL HYPERTENSION, BENIGN: ICD-10-CM

## 2020-07-20 DIAGNOSIS — I77.9 PERIPHERAL ARTERIAL OCCLUSIVE DISEASE (HCC): ICD-10-CM

## 2020-07-20 DIAGNOSIS — E11.42 TYPE 2 DIABETES MELLITUS WITH DIABETIC POLYNEUROPATHY, WITH LONG-TERM CURRENT USE OF INSULIN (HCC): ICD-10-CM

## 2020-07-20 DIAGNOSIS — I73.9 PERIPHERAL VASCULAR DISEASE (HCC): ICD-10-CM

## 2020-07-20 DIAGNOSIS — Z79.4 TYPE 2 DIABETES MELLITUS WITH DIABETIC POLYNEUROPATHY, WITH LONG-TERM CURRENT USE OF INSULIN (HCC): ICD-10-CM

## 2020-07-20 DIAGNOSIS — Z95.5 S/P CORONARY ARTERY STENT PLACEMENT: ICD-10-CM

## 2020-07-20 DIAGNOSIS — I25.10 CAD, MULTIPLE VESSEL: ICD-10-CM

## 2020-07-20 RX ORDER — TAMSULOSIN HYDROCHLORIDE 0.4 MG/1
CAPSULE ORAL
Qty: 90 CAPSULE | Refills: 1 | OUTPATIENT
Start: 2020-07-20

## 2020-07-20 RX ORDER — METFORMIN HYDROCHLORIDE 750 MG/1
TABLET, EXTENDED RELEASE ORAL
Qty: 180 TABLET | Refills: 1 | OUTPATIENT
Start: 2020-07-20

## 2020-07-20 RX ORDER — AMLODIPINE BESYLATE 5 MG/1
TABLET ORAL
Qty: 90 TABLET | Refills: 0 | OUTPATIENT
Start: 2020-07-20

## 2020-07-20 RX ORDER — CLOPIDOGREL BISULFATE 75 MG/1
TABLET ORAL
Qty: 90 TABLET | Refills: 1 | OUTPATIENT
Start: 2020-07-20

## 2020-07-20 NOTE — TELEPHONE ENCOUNTER
I had previous denied refills today, because they were sent to [facility], however I am receiving second request now from [facility]. Call placed to Niravr, daughter, per HIPAA, to clarify if these prescriptions are needed.    Keagan Nam is als

## 2020-07-22 ENCOUNTER — TELEPHONE (OUTPATIENT)
Dept: FAMILY MEDICINE CLINIC | Facility: CLINIC | Age: 70
End: 2020-07-22

## 2020-07-22 NOTE — TELEPHONE ENCOUNTER
Mustapha nurse with [address] [place] [address] called office stating pt is being discharged today 07/22/2020 from home health. Today was the last visit with pt.

## 2020-07-23 ENCOUNTER — TELEPHONE (OUTPATIENT)
Dept: FAMILY MEDICINE CLINIC | Facility: CLINIC | Age: 70
End: 2020-07-23

## 2020-07-23 NOTE — TELEPHONE ENCOUNTER
[facility] to see if they were still seeing the patient  Spoke with Christin Dates  Last orders were 6/20/2020  Patient has been discharged from their services

## 2020-07-28 NOTE — TELEPHONE ENCOUNTER
LMOM to return call to the office. Provided pt office phone [phone] along with office hours given.   Patient due for apt and lab work   Clarify if refills are needed

## 2020-07-29 ENCOUNTER — TELEPHONE (OUTPATIENT)
Dept: FAMILY MEDICINE CLINIC | Facility: CLINIC | Age: 70
End: 2020-07-29

## 2020-07-29 RX ORDER — CLOPIDOGREL BISULFATE 75 MG/1
TABLET ORAL
Qty: 90 TABLET | Refills: 1 | OUTPATIENT
Start: 2020-07-29

## 2020-07-29 RX ORDER — METFORMIN HYDROCHLORIDE 750 MG/1
TABLET, EXTENDED RELEASE ORAL
Qty: 180 TABLET | Refills: 1 | OUTPATIENT
Start: 2020-07-29

## 2020-07-29 RX ORDER — TAMSULOSIN HYDROCHLORIDE 0.4 MG/1
CAPSULE ORAL
Qty: 90 CAPSULE | Refills: 1 | OUTPATIENT
Start: 2020-07-29

## 2020-07-29 NOTE — TELEPHONE ENCOUNTER
LMOM to return call to the office as this was my 3rd and final attempt to try to reach you. Provided pt office phone [phone] along with office hours given. Letter sent to [employer].

## 2020-08-04 ENCOUNTER — EXTERNAL RECORD (OUTPATIENT)
Dept: HEALTH INFORMATION MANAGEMENT | Facility: OTHER | Age: 70
End: 2020-08-04

## 2020-08-04 ENCOUNTER — OFFICE VISIT (OUTPATIENT)
Dept: FAMILY MEDICINE CLINIC | Facility: CLINIC | Age: 70
End: 2020-08-04
Payer: MEDICARE

## 2020-08-04 VITALS
RESPIRATION RATE: 18 BRPM | TEMPERATURE: 98 F | OXYGEN SATURATION: 95 % | HEART RATE: 76 BPM | DIASTOLIC BLOOD PRESSURE: 62 MMHG | SYSTOLIC BLOOD PRESSURE: 120 MMHG

## 2020-08-04 DIAGNOSIS — I69.354 HEMIPARESIS AFFECTING LEFT SIDE AS LATE EFFECT OF STROKE (HCC): ICD-10-CM

## 2020-08-04 DIAGNOSIS — F51.04 PSYCHOPHYSIOLOGICAL INSOMNIA: ICD-10-CM

## 2020-08-04 DIAGNOSIS — N39.41 BENIGN PROSTATIC HYPERPLASIA (BPH) WITH URINARY URGE INCONTINENCE: ICD-10-CM

## 2020-08-04 DIAGNOSIS — Z12.11 SCREEN FOR COLON CANCER: ICD-10-CM

## 2020-08-04 DIAGNOSIS — R29.898 MUSCULAR DECONDITIONING: ICD-10-CM

## 2020-08-04 DIAGNOSIS — I61.0 NONTRAUMATIC SUBCORTICAL HEMORRHAGE OF RIGHT CEREBRAL HEMISPHERE (HCC): ICD-10-CM

## 2020-08-04 DIAGNOSIS — F33.42 RECURRENT MAJOR DEPRESSIVE DISORDER, IN FULL REMISSION (HCC): ICD-10-CM

## 2020-08-04 DIAGNOSIS — D50.0 IRON DEFICIENCY ANEMIA DUE TO CHRONIC BLOOD LOSS: ICD-10-CM

## 2020-08-04 DIAGNOSIS — I62.9 INTRACRANIAL HEMORRHAGE (HCC): ICD-10-CM

## 2020-08-04 DIAGNOSIS — I25.810 CORONARY ARTERY DISEASE INVOLVING OTHER CORONARY ARTERY BYPASS GRAFT WITHOUT ANGINA PECTORIS: ICD-10-CM

## 2020-08-04 DIAGNOSIS — I10 ESSENTIAL HYPERTENSION, BENIGN: ICD-10-CM

## 2020-08-04 DIAGNOSIS — Z79.4 TYPE 2 DIABETES MELLITUS WITH DIABETIC POLYNEUROPATHY, WITH LONG-TERM CURRENT USE OF INSULIN (HCC): ICD-10-CM

## 2020-08-04 DIAGNOSIS — E11.42 TYPE 2 DIABETES MELLITUS WITH DIABETIC POLYNEUROPATHY, WITH LONG-TERM CURRENT USE OF INSULIN (HCC): ICD-10-CM

## 2020-08-04 DIAGNOSIS — I71.4 ABDOMINAL AORTIC ANEURYSM (AAA) WITHOUT RUPTURE (HCC): ICD-10-CM

## 2020-08-04 DIAGNOSIS — K59.01 SLOW TRANSIT CONSTIPATION: ICD-10-CM

## 2020-08-04 DIAGNOSIS — R55 VASOVAGAL SYNCOPE: ICD-10-CM

## 2020-08-04 DIAGNOSIS — Z91.81 AT HIGH RISK FOR INJURY RELATED TO FALL: ICD-10-CM

## 2020-08-04 DIAGNOSIS — Z95.5 S/P CORONARY ARTERY STENT PLACEMENT: ICD-10-CM

## 2020-08-04 DIAGNOSIS — I73.9 PVD (PERIPHERAL VASCULAR DISEASE) (HCC): ICD-10-CM

## 2020-08-04 DIAGNOSIS — K55.20 AVM (ARTERIOVENOUS MALFORMATION) OF COLON WITHOUT HEMORRHAGE: ICD-10-CM

## 2020-08-04 DIAGNOSIS — N40.1 BENIGN PROSTATIC HYPERPLASIA (BPH) WITH URINARY URGE INCONTINENCE: ICD-10-CM

## 2020-08-04 DIAGNOSIS — R05.9 COUGH: ICD-10-CM

## 2020-08-04 DIAGNOSIS — K21.9 GASTROESOPHAGEAL REFLUX DISEASE, ESOPHAGITIS PRESENCE NOT SPECIFIED: ICD-10-CM

## 2020-08-04 DIAGNOSIS — I72.3 ILIAC ARTERY ANEURYSM, LEFT (HCC): ICD-10-CM

## 2020-08-04 DIAGNOSIS — I71.00 DISSECTION OF AORTA, UNSPECIFIED PORTION OF AORTA (HCC): ICD-10-CM

## 2020-08-04 DIAGNOSIS — Z00.00 ENCOUNTER FOR ANNUAL HEALTH EXAMINATION: Primary | ICD-10-CM

## 2020-08-04 DIAGNOSIS — E78.2 MIXED HYPERLIPIDEMIA: ICD-10-CM

## 2020-08-04 DIAGNOSIS — I63.89 CEREBROVASCULAR ACCIDENT (CVA) DUE TO OTHER MECHANISM (HCC): ICD-10-CM

## 2020-08-04 PROBLEM — T83.511A URINARY TRACT INFECTION ASSOCIATED WITH INDWELLING URETHRAL CATHETER (HCC): Status: RESOLVED | Noted: 2019-06-25 | Resolved: 2020-08-04

## 2020-08-04 PROBLEM — I63.9 CVA (CEREBRAL VASCULAR ACCIDENT) (HCC): Status: ACTIVE | Noted: 2020-08-04

## 2020-08-04 PROBLEM — N39.0 URINARY TRACT INFECTION ASSOCIATED WITH INDWELLING URETHRAL CATHETER: Status: RESOLVED | Noted: 2019-06-25 | Resolved: 2020-08-04

## 2020-08-04 PROBLEM — N41.1 CHRONIC PROSTATITIS: Status: RESOLVED | Noted: 2019-08-15 | Resolved: 2020-08-04

## 2020-08-04 PROBLEM — R53.83 OTHER FATIGUE: Status: RESOLVED | Noted: 2019-08-15 | Resolved: 2020-08-04

## 2020-08-04 PROBLEM — N13.8 BPH WITH OBSTRUCTION/LOWER URINARY TRACT SYMPTOMS: Status: RESOLVED | Noted: 2017-12-20 | Resolved: 2020-08-04

## 2020-08-04 PROBLEM — T83.511A URINARY TRACT INFECTION ASSOCIATED WITH INDWELLING URETHRAL CATHETER: Status: RESOLVED | Noted: 2019-06-25 | Resolved: 2020-08-04

## 2020-08-04 PROBLEM — N39.0 URINARY TRACT INFECTION ASSOCIATED WITH INDWELLING URETHRAL CATHETER (HCC): Status: RESOLVED | Noted: 2019-06-25 | Resolved: 2020-08-04

## 2020-08-04 PROCEDURE — 96160 PT-FOCUSED HLTH RISK ASSMT: CPT | Performed by: FAMILY MEDICINE

## 2020-08-04 PROCEDURE — G0439 PPPS, SUBSEQ VISIT: HCPCS | Performed by: FAMILY MEDICINE

## 2020-08-04 PROCEDURE — 3074F SYST BP LT 130 MM HG: CPT | Performed by: FAMILY MEDICINE

## 2020-08-04 PROCEDURE — 3008F BODY MASS INDEX DOCD: CPT | Performed by: FAMILY MEDICINE

## 2020-08-04 PROCEDURE — 3078F DIAST BP <80 MM HG: CPT | Performed by: FAMILY MEDICINE

## 2020-08-04 PROCEDURE — 99397 PER PM REEVAL EST PAT 65+ YR: CPT | Performed by: FAMILY MEDICINE

## 2020-08-04 RX ORDER — CEPHALEXIN 500 MG/1
500 CAPSULE ORAL EVERY 8 HOURS
Status: ON HOLD | COMMUNITY
Start: 2020-06-18 | End: 2020-09-13

## 2020-08-04 NOTE — PROGRESS NOTES
HPI:   Tg Linton is a 68year old male who presents for a MA (Medicare Advantage) [facility] (Once per calendar year). Patient alert and partially smiled at me upon entering room. Knows the name but poor historian.   Will answer some yes or no qu trazodone for sleep and sleeping during the day. Daughter does not feel he is depressed or sad. Has a psychiatrist and seen over several months feels meds are controlled.   Mood swings have resolved denies aggressive behavior towards daughter or shaking 2.0x10 mm AngioSculpt Scoring Balloon and Resolute integrity 2.75x22 mm KATIA  Patent SVG-Diag with retrograde flow into mid and distal LAD  Patent SVG-RPLS  Patent SVG-OM  Atretic LIMA-LAD  - no angina  - 1/29/2018 Lexiscan with no reversible defect  * unfo oxybutynin and Flomax/tamsulosin. History of urinary retention last visit with urologist at  was 5/17/2018. Recommended follow-up in 6 months for PVR on 11/17/2018. Denies urinary incontinence or urinary retention.   Since TURP  Bladder history and feels controlled.   On gabapentin      His last annual assessment has been over 1 year: Annual Physical due on 1952         Fall/Risk Assessment abnormal    He has been screened for Falls and is High Risk: Fall/Risk Scorin    Increasing activity, interest or pleasure in doing things (over the last two weeks)?: Not at all  Feeling down, depressed, or hopeless (over the last two weeks)?: Not at all  PHQ-2 SCORE: 0     Advanced Directive:   He does NOT have a Living Will on file in [address] [place] [address] Rd.   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 denies shortness of breath with exertion,wheezing, aspiration, choking.   CARDIOVASCULAR: denies chest pain on exertion  GI: denies abdominal pain, denies heartburn or vo drainage or sinus tenderness   Throat: Lips, mucosa, and tongue normal; teeth and gums normal   Neck: Supple, symmetrical, trachea midline, no adenopathy, thyroid: not enlarged, symmetric, no tenderness/mass/nodules, no carotid bruit or JVD   Back:   Symme (Prevnar 13) 01/07/2016, 10/01/2017   • Pneumovax 23 08/27/2014, 10/01/2014   • TDAP 01/05/2015        ASSESSMENT AND OTHER RELEVANT CHRONIC CONDITIONS:   Kesha Lizama is a 68year old male who presents for a Medicare Assessment.      PLAN SUMMARY:   Diajames coronary artery bypass graft without angina pectoris  14. PVD (peripheral vascular disease) ([address]  23.  Mixed hyperlipidemia  Continue plavix, atorvastatin, ASA  - CARDIO - INTERNAL  Hx of angina- denies now, no nitroglycerin >1 year  Cmp, lipid q 6 months 10/21/2019 5.9 (A)       No flowsheet data found.     Fasting Blood Sugar (FSB)Annually Glucose (mg/dL)   Date Value   10/21/2019 107 (H)       Cardiovascular Disease Screening     LDL Annually LDL Cholesterol (mg/dL)   Date Value   11/08/2018 39        E history found This may be covered with your pharmacy  prescription benefits      SPECIFIC DISEASE MONITORING Internal Lab or Procedure External Lab or Procedure      Annual Monitoring of Persistent     Medications (ACE/ARB, digoxin diuretics, anticonvulsan

## 2020-08-04 NOTE — PATIENT INSTRUCTIONS
Perform labs fasting 8 hours with water or black coffee or or black tea diet  soda only prior to exam- complete through [facility] ordered or [facility]    Complete stool study    Referrals for eye doctor, cardiologist, foot doctor    Shiela Delgado one of the following criteria:   • Men who are 73-68 years old and have smoked more than 100 cigarettes in their lifetime   • Anyone with a family history    Colorectal Cancer Screening Covered up to Age 76     Colonoscopy Screen   Covered every 10 years- mentally retarded   Persons who live in the same house as a HepB virus carrier   Homosexual men   Illicit injectable drug abusers     Tetanus Toxoid- Only covered with a cut with metal- TD and TDaP Not covered by Medicare Part B) No orders found for this o are information updates regularly. Public health officials are working to find the source. How the virus spreads is not yet fully understood, but it seems to spread and infect people fairly easily.  Some people who have been infected in an area may not be multisystem inflammatory syndrome in children (MIS-C). MIS-C seems to be similar to [facility] disease, a rare condition causing inflammation of blood vessels and body organs.  It's not yet known if MIS-C happens only in children, or if adults are also at risk some immunity. The accuracy and availability of antibody tests vary. An antibody test may not be able to show if you have a current infection because it can take up to a few weeks after infection to make antibodies.  It's not yet known how ANA PAULA Arellano turn you regularly on your stomach. This is called prone positioning. It helps increase the amount of oxygen you get to your lungs.  Follow your healthcare team's instructions on position changes while you're in the hospital. Also follow their discharge adv be reserved for healthcare workers. But you can make a cloth face mask of your own. You can do this using a bandanna, T-shirt, or other cloth. How a cloth face mask helps prevent COVID-19  A cloth face mask can help prevent spread of viruses.  CDC advises of fabric above and below the rectangle. This will leave enough fabric to cover your face, plus give you 2 straps. Step 3. Cut each of the 2 straps right in the middle. This is so you can tie the straps behind your neck and head. This is your mask. Gustavo [address]37. [employer] [facility], [address] [place] [address]. All rights reserved. This information is not intended as a substitute for professional medical care. Always follow your healthcare professional's instructions.

## 2020-08-05 ENCOUNTER — TELEPHONE (OUTPATIENT)
Dept: FAMILY MEDICINE CLINIC | Facility: CLINIC | Age: 70
End: 2020-08-05

## 2020-08-05 ENCOUNTER — TELEPHONE (OUTPATIENT)
Dept: CARDIOLOGY | Age: 70
End: 2020-08-05

## 2020-08-05 NOTE — TELEPHONE ENCOUNTER
Patient's daughter Rajwinder Hirsch) called requesting a referral to Dr. Edith Lindsey (ophthalmologist). She said the patient has seen this provider before.

## 2020-08-06 ENCOUNTER — TELEPHONE (OUTPATIENT)
Dept: CARDIOLOGY | Age: 70
End: 2020-08-06

## 2020-08-09 PROBLEM — N39.41 BENIGN PROSTATIC HYPERPLASIA (BPH) WITH URINARY URGE INCONTINENCE: Status: ACTIVE | Noted: 2017-12-20

## 2020-08-09 PROBLEM — N40.1 BENIGN PROSTATIC HYPERPLASIA (BPH) WITH URINARY URGE INCONTINENCE: Status: ACTIVE | Noted: 2017-12-20

## 2020-08-09 PROBLEM — R29.898 MUSCULAR DECONDITIONING: Status: ACTIVE | Noted: 2020-08-09

## 2020-08-09 PROBLEM — R51.9 ACUTE NONINTRACTABLE HEADACHE: Status: RESOLVED | Noted: 2019-06-25 | Resolved: 2020-08-09

## 2020-08-11 ENCOUNTER — TELEPHONE (OUTPATIENT)
Dept: FAMILY MEDICINE CLINIC | Facility: CLINIC | Age: 70
End: 2020-08-11

## 2020-08-12 NOTE — TELEPHONE ENCOUNTER
LMOM to return call to the office. Provided pt office phone [phone] along with office hours given.

## 2020-08-12 NOTE — TELEPHONE ENCOUNTER
Call received from [facility], initiated start of care for Home Health yesterday. Per [facility], RN, patient with stage two pressure ulcer of L buttock, several small DTI of left foot. Recommends meta-honey for pressure sore.  Recommends skin prep with foam for l

## 2020-08-12 NOTE — TELEPHONE ENCOUNTER
Left detailed message for NARCISA Jones with verbal orders. Advised to call back if anything further is needed.

## 2020-08-14 ENCOUNTER — OFFICE VISIT (OUTPATIENT)
Dept: PODIATRY CLINIC | Facility: CLINIC | Age: 70
End: 2020-08-14
Payer: COMMERCIAL

## 2020-08-14 DIAGNOSIS — M79.675 PAIN IN TOES OF BOTH FEET: Primary | ICD-10-CM

## 2020-08-14 DIAGNOSIS — E11.42 TYPE 2 DIABETES MELLITUS WITH DIABETIC POLYNEUROPATHY, WITH LONG-TERM CURRENT USE OF INSULIN (HCC): ICD-10-CM

## 2020-08-14 DIAGNOSIS — M79.674 PAIN IN TOES OF BOTH FEET: Primary | ICD-10-CM

## 2020-08-14 DIAGNOSIS — B35.1 ONYCHOMYCOSIS: ICD-10-CM

## 2020-08-14 DIAGNOSIS — Z79.4 TYPE 2 DIABETES MELLITUS WITH DIABETIC POLYNEUROPATHY, WITH LONG-TERM CURRENT USE OF INSULIN (HCC): ICD-10-CM

## 2020-08-14 PROCEDURE — 11721 DEBRIDE NAIL 6 OR MORE: CPT | Performed by: PODIATRIST

## 2020-08-14 NOTE — PROGRESS NOTES
Patient ID: Tamanna Paetl is a 68year old male. HPI:     This 68-year-old male patient with a past medical history of a CVA presents to clinic today for diabetic foot care.    He is transported here from his daughter's home by ambulance and presents tod traZODone HCl 150 MG Oral Tab TK 1 T PO HS     • risperiDONE 0.5 MG Oral Tab TK 1 T PO BID     • ofloxacin 0.3 % Ophthalmic Solution 1 gtt to affected eye(s) every 2 hours x 2 days then 1 gtt affected eye(s) every 6hours x 5 days- total is 7 days.  10 mL 0 directed as needed. • Lancets Does not apply Misc Inject 1 applicator as directed 3 (three) times daily.        Allergies:  Lisinopril              SWELLING, ANGIOEDEMA    Comment:Severe reaction/hospitalized  Radiology Contrast *    ANGIOEDEMA    Comme is to continue to follow-up with [facility] for treatment of the the wound on his left foot. The patient/his daughter Emerald Beyer) indicate understanding of these issues and agree to the plan. Return in about 2 months (around 10/14/2020).  Jama Godinezs will

## 2020-08-18 ENCOUNTER — TELEPHONE (OUTPATIENT)
Dept: FAMILY MEDICINE CLINIC | Facility: CLINIC | Age: 70
End: 2020-08-18

## 2020-08-18 NOTE — TELEPHONE ENCOUNTER
DTI (deep tissue injury )on left heel and left side  The sore on the heel is going from blood filled blister and it is starting to harden  His daughter is doing well with keeping pressure of the areas  RN due back Thursday  Can they put betadine on it to t

## 2020-08-19 NOTE — TELEPHONE ENCOUNTER
Maddie notified of instructions  Reports she does have those supplies    Verbalizes understanding of instructions

## 2020-08-19 NOTE — TELEPHONE ENCOUNTER
No betadine. Recommend medical honey off amaz[employer] ointment tid  [employer] and water daily.   If worsens may need wound clinic,  Call ASAP if signs of infection- increased redness, pain , swelling, fever, streaking redness etc.

## 2020-08-19 NOTE — TELEPHONE ENCOUNTER
LMOM to return call to the office. Provided pt office phone [phone] along with office hours given.     Message left for Mustapha Rn to call office to provide wound instructions

## 2020-08-19 NOTE — TELEPHONE ENCOUNTER
Spoke with [facility] with [address]. He verbalized understanding of directions below. He will let us know if new or worsening symptoms.

## 2020-09-08 ENCOUNTER — TELEPHONE (OUTPATIENT)
Dept: OTHER | Age: 70
End: 2020-09-08

## 2020-09-10 ENCOUNTER — TELEPHONE (OUTPATIENT)
Dept: FAMILY MEDICINE CLINIC | Facility: CLINIC | Age: 70
End: 2020-09-10

## 2020-09-10 NOTE — TELEPHONE ENCOUNTER
José Manuel Pascual with [address] called wanting to speak to the nurse about the patient.

## 2020-09-11 ENCOUNTER — TELEPHONE (OUTPATIENT)
Dept: FAMILY MEDICINE CLINIC | Facility: CLINIC | Age: 70
End: 2020-09-11

## 2020-09-11 NOTE — TELEPHONE ENCOUNTER
received orders dated 9/3/2020 for wound care  Received Plan of Care Certification Period 8/11/2020-10/9/2020  Reviewed/signed  by PCP   Faxed to [facility], jermaine rec'd  Copy to scanning

## 2020-09-12 ENCOUNTER — HOSPITAL ENCOUNTER (OUTPATIENT)
Facility: HOSPITAL | Age: 70
Setting detail: OBSERVATION
Discharge: HOME HEALTH | End: 2020-09-15
Attending: EMERGENCY MEDICINE | Admitting: INTERNAL MEDICINE
Payer: MEDICARE

## 2020-09-12 ENCOUNTER — APPOINTMENT (OUTPATIENT)
Dept: GENERAL RADIOLOGY | Facility: HOSPITAL | Age: 70
End: 2020-09-12
Attending: EMERGENCY MEDICINE
Payer: MEDICARE

## 2020-09-12 DIAGNOSIS — Z79.4 TYPE 2 DIABETES MELLITUS WITH DIABETIC POLYNEUROPATHY, WITH LONG-TERM CURRENT USE OF INSULIN (HCC): ICD-10-CM

## 2020-09-12 DIAGNOSIS — E11.42 TYPE 2 DIABETES MELLITUS WITH DIABETIC POLYNEUROPATHY, WITH LONG-TERM CURRENT USE OF INSULIN (HCC): ICD-10-CM

## 2020-09-12 DIAGNOSIS — T17.908A ASPIRATION INTO AIRWAY, INITIAL ENCOUNTER: Primary | ICD-10-CM

## 2020-09-12 LAB
ALBUMIN SERPL-MCNC: 3.2 G/DL (ref 3.4–5)
ALBUMIN/GLOB SERPL: 0.9 {RATIO} (ref 1–2)
ALP LIVER SERPL-CCNC: 132 U/L (ref 45–117)
ALT SERPL-CCNC: 192 U/L (ref 16–61)
ANION GAP SERPL CALC-SCNC: 4 MMOL/L (ref 0–18)
AST SERPL-CCNC: 135 U/L (ref 15–37)
BASOPHILS # BLD AUTO: 0.02 X10(3) UL (ref 0–0.2)
BASOPHILS NFR BLD AUTO: 0.6 %
BILIRUB SERPL-MCNC: 0.5 MG/DL (ref 0.1–2)
BUN BLD-MCNC: 7 MG/DL (ref 7–18)
BUN/CREAT SERPL: 14.3 (ref 10–20)
CALCIUM BLD-MCNC: 9.1 MG/DL (ref 8.5–10.1)
CHLORIDE SERPL-SCNC: 105 MMOL/L (ref 98–112)
CO2 SERPL-SCNC: 28 MMOL/L (ref 21–32)
CREAT BLD-MCNC: 0.49 MG/DL (ref 0.7–1.3)
DEPRECATED RDW RBC AUTO: 49.9 FL (ref 35.1–46.3)
EOSINOPHIL # BLD AUTO: 0.11 X10(3) UL (ref 0–0.7)
EOSINOPHIL NFR BLD AUTO: 3.3 %
ERYTHROCYTE [DISTWIDTH] IN BLOOD BY AUTOMATED COUNT: 17.3 % (ref 11–15)
EST. AVERAGE GLUCOSE BLD GHB EST-MCNC: 120 MG/DL (ref 68–126)
GLOBULIN PLAS-MCNC: 3.6 G/DL (ref 2.8–4.4)
GLUCOSE BLD-MCNC: 103 MG/DL (ref 70–99)
GLUCOSE BLD-MCNC: 105 MG/DL (ref 70–99)
GLUCOSE BLD-MCNC: 99 MG/DL (ref 70–99)
HAV IGM SER QL: NONREACTIVE
HBA1C MFR BLD HPLC: 5.8 % (ref ?–5.7)
HBV CORE IGM SER QL: NONREACTIVE
HBV SURFACE AG SERPL QL IA: NONREACTIVE
HCT VFR BLD AUTO: 32.4 % (ref 39–53)
HCV AB SERPL QL IA: NONREACTIVE
HGB BLD-MCNC: 10.2 G/DL (ref 13–17.5)
IMM GRANULOCYTES # BLD AUTO: 0.04 X10(3) UL (ref 0–1)
IMM GRANULOCYTES NFR BLD: 1.2 %
LACTATE SERPL-SCNC: 1.1 MMOL/L (ref 0.4–2)
LYMPHOCYTES # BLD AUTO: 1.09 X10(3) UL (ref 1–4)
LYMPHOCYTES NFR BLD AUTO: 33.1 %
M PROTEIN MFR SERPL ELPH: 6.8 G/DL (ref 6.4–8.2)
MCH RBC QN AUTO: 24.8 PG (ref 26–34)
MCHC RBC AUTO-ENTMCNC: 31.5 G/DL (ref 31–37)
MCV RBC AUTO: 78.6 FL (ref 80–100)
MONOCYTES # BLD AUTO: 0.3 X10(3) UL (ref 0.1–1)
MONOCYTES NFR BLD AUTO: 9.1 %
NEUTROPHILS # BLD AUTO: 1.73 X10 (3) UL (ref 1.5–7.7)
NEUTROPHILS # BLD AUTO: 1.73 X10(3) UL (ref 1.5–7.7)
NEUTROPHILS NFR BLD AUTO: 52.7 %
OSMOLALITY SERPL CALC.SUM OF ELEC: 282 MOSM/KG (ref 275–295)
PLATELET # BLD AUTO: 214 10(3)UL (ref 150–450)
POTASSIUM SERPL-SCNC: 3.8 MMOL/L (ref 3.5–5.1)
PROCALCITONIN SERPL-MCNC: <0.05 NG/ML (ref ?–0.16)
RBC # BLD AUTO: 4.12 X10(6)UL (ref 3.8–5.8)
SODIUM SERPL-SCNC: 137 MMOL/L (ref 136–145)
WBC # BLD AUTO: 3.3 X10(3) UL (ref 4–11)

## 2020-09-12 PROCEDURE — 99220 INITIAL OBSERVATION CARE,LEVL III: CPT | Performed by: INTERNAL MEDICINE

## 2020-09-12 PROCEDURE — 71045 X-RAY EXAM CHEST 1 VIEW: CPT | Performed by: EMERGENCY MEDICINE

## 2020-09-12 RX ORDER — CLOPIDOGREL BISULFATE 75 MG/1
75 TABLET ORAL DAILY
Status: DISCONTINUED | OUTPATIENT
Start: 2020-09-12 | End: 2020-09-15

## 2020-09-12 RX ORDER — ONDANSETRON 2 MG/ML
4 INJECTION INTRAMUSCULAR; INTRAVENOUS EVERY 6 HOURS PRN
Status: DISCONTINUED | OUTPATIENT
Start: 2020-09-12 | End: 2020-09-15

## 2020-09-12 RX ORDER — DEXTROSE MONOHYDRATE 25 G/50ML
50 INJECTION, SOLUTION INTRAVENOUS
Status: DISCONTINUED | OUTPATIENT
Start: 2020-09-12 | End: 2020-09-15

## 2020-09-12 RX ORDER — ENOXAPARIN SODIUM 100 MG/ML
40 INJECTION SUBCUTANEOUS DAILY
Status: DISCONTINUED | OUTPATIENT
Start: 2020-09-12 | End: 2020-09-15

## 2020-09-12 RX ORDER — BACLOFEN 10 MG/1
15 TABLET ORAL EVERY 8 HOURS
Status: DISCONTINUED | OUTPATIENT
Start: 2020-09-12 | End: 2020-09-15

## 2020-09-12 RX ORDER — BISACODYL 10 MG
10 SUPPOSITORY, RECTAL RECTAL
Status: DISCONTINUED | OUTPATIENT
Start: 2020-09-12 | End: 2020-09-15

## 2020-09-12 RX ORDER — ATORVASTATIN CALCIUM 20 MG/1
20 TABLET, FILM COATED ORAL NIGHTLY
Status: CANCELLED | OUTPATIENT
Start: 2020-09-12

## 2020-09-12 RX ORDER — SODIUM CHLORIDE 9 MG/ML
INJECTION, SOLUTION INTRAVENOUS CONTINUOUS
Status: DISCONTINUED | OUTPATIENT
Start: 2020-09-12 | End: 2020-09-15

## 2020-09-12 RX ORDER — ASPIRIN 81 MG/1
81 TABLET ORAL DAILY
Status: DISCONTINUED | OUTPATIENT
Start: 2020-09-12 | End: 2020-09-15

## 2020-09-12 RX ORDER — FAMOTIDINE 10 MG/ML
20 INJECTION, SOLUTION INTRAVENOUS 2 TIMES DAILY
Status: DISCONTINUED | OUTPATIENT
Start: 2020-09-12 | End: 2020-09-14

## 2020-09-12 RX ORDER — SERTRALINE HYDROCHLORIDE 100 MG/1
200 TABLET, FILM COATED ORAL DAILY
Status: DISCONTINUED | OUTPATIENT
Start: 2020-09-12 | End: 2020-09-15

## 2020-09-12 RX ORDER — TAMSULOSIN HYDROCHLORIDE 0.4 MG/1
0.4 CAPSULE ORAL DAILY
Status: DISCONTINUED | OUTPATIENT
Start: 2020-09-12 | End: 2020-09-15

## 2020-09-12 NOTE — ED NOTES
Spoke with patient daughter. Daughter is concerned that her father is having increased swallowing issues. Even with thickened liquids. This morning he was unable to swallow his coffee.  Would like swallow test/eval.

## 2020-09-12 NOTE — ED PROVIDER NOTES
Patient Seen in: [facility] Emergency Department      History   Patient presents with:  Dyspnea FREDO SOB: aspiration x2 days ago.  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 Constitutional and vital signs reviewed. All other systems reviewed and negative except as noted above.     Physical Exam     ED Triage Vitals [09/ orders were created for panel order CBC WITH DIFFERENTIAL WITH PLATELET.   Procedure                               Abnormality         Status                     ---------                               -----------         ------                     CBC W/ D [id number] 9/12/2020 Unknown

## 2020-09-12 NOTE — ED INITIAL ASSESSMENT (HPI)
Pt family most concerned with patient swallowing. Believes the patient may has aspiration pneumonia bc the caregiver \"may not\" have put enough thickening agent in food.

## 2020-09-12 NOTE — ED INITIAL ASSESSMENT (HPI)
Pt to ed with rpts of coughing 2 days ago. Current Issues with swallowing. Daughter who is primary caregiver is concerned the patient is wheezing. A/o x4. Hx of stroke 2019. Left sided weakness.  Denies n/v/d/f

## 2020-09-12 NOTE — H&P
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 affected eye(s) every 6hours x 5 days- total is 7 days. , Disp: 10 mL, Rfl: 0  PEG 3350 Oral Powd Pack, Take 17 g by mouth daily. , Disp: 100 each, Rfl: 11  LORazepam 1 MG Oral Tab, TAKE 1 T PO PRF OVERWHELMING ANXIETY UTD, Disp: , Rfl: 0  Ondansetron HCl (Z , Rfl:   Lancets Does not apply Misc, Inject 1 applicator as directed 3 (three) times daily. , Disp: , Rfl:         Review of Systems:   A comprehensive 14 point review of systems was completed. Pertinent positives and negatives noted in the HPI.     Phys stroke 3/2019 with left hemiparesis   9. Depression   10. BPH     Addendum: if COVID PCR neg may DC isolation as pt is from home  Quality:  · DVT Prophylaxis: lovenox  · CODE status: full  · Cook: none    Plan of care discussed with patient and ER.      Ad

## 2020-09-12 NOTE — PLAN OF CARE
NURSING ADMISSION NOTE      Patient admitted via Cart  Oriented to room. Safety precautions initiated. Bed in low position. Call light in reach. Alert & oriented x3, Disoriented to time. Forgetful  Tele, NSR  RA  Pt denies pain.   Hx of CVA with

## 2020-09-13 ENCOUNTER — APPOINTMENT (OUTPATIENT)
Dept: ULTRASOUND IMAGING | Facility: HOSPITAL | Age: 70
End: 2020-09-13
Attending: INTERNAL MEDICINE
Payer: MEDICARE

## 2020-09-13 LAB
ALBUMIN SERPL-MCNC: 2.9 G/DL (ref 3.4–5)
ALBUMIN/GLOB SERPL: 0.8 {RATIO} (ref 1–2)
ALP LIVER SERPL-CCNC: 122 U/L (ref 45–117)
ALT SERPL-CCNC: 170 U/L (ref 16–61)
ANION GAP SERPL CALC-SCNC: 4 MMOL/L (ref 0–18)
AST SERPL-CCNC: 129 U/L (ref 15–37)
BILIRUB SERPL-MCNC: 0.5 MG/DL (ref 0.1–2)
BUN BLD-MCNC: 7 MG/DL (ref 7–18)
BUN/CREAT SERPL: 15.9 (ref 10–20)
CALCIUM BLD-MCNC: 8.5 MG/DL (ref 8.5–10.1)
CHLORIDE SERPL-SCNC: 106 MMOL/L (ref 98–112)
CO2 SERPL-SCNC: 26 MMOL/L (ref 21–32)
CREAT BLD-MCNC: 0.44 MG/DL (ref 0.7–1.3)
GLOBULIN PLAS-MCNC: 3.5 G/DL (ref 2.8–4.4)
GLUCOSE BLD-MCNC: 121 MG/DL (ref 70–99)
GLUCOSE BLD-MCNC: 164 MG/DL (ref 70–99)
GLUCOSE BLD-MCNC: 200 MG/DL (ref 70–99)
GLUCOSE BLD-MCNC: 237 MG/DL (ref 70–99)
GLUCOSE BLD-MCNC: 73 MG/DL (ref 70–99)
GLUCOSE BLD-MCNC: 78 MG/DL (ref 70–99)
GLUCOSE BLD-MCNC: 81 MG/DL (ref 70–99)
GLUCOSE BLD-MCNC: 88 MG/DL (ref 70–99)
M PROTEIN MFR SERPL ELPH: 6.4 G/DL (ref 6.4–8.2)
OSMOLALITY SERPL CALC.SUM OF ELEC: 279 MOSM/KG (ref 275–295)
POTASSIUM SERPL-SCNC: 3.4 MMOL/L (ref 3.5–5.1)
SARS-COV-2 RNA RESP QL NAA+PROBE: NOT DETECTED
SODIUM SERPL-SCNC: 136 MMOL/L (ref 136–145)

## 2020-09-13 PROCEDURE — 76700 US EXAM ABDOM COMPLETE: CPT | Performed by: INTERNAL MEDICINE

## 2020-09-13 PROCEDURE — 99225 SUBSEQUENT OBSERVATION CARE: CPT | Performed by: INTERNAL MEDICINE

## 2020-09-13 RX ORDER — GABAPENTIN 300 MG/1
600 CAPSULE ORAL NIGHTLY
Status: DISCONTINUED | OUTPATIENT
Start: 2020-09-13 | End: 2020-09-15

## 2020-09-13 RX ORDER — GABAPENTIN 300 MG/1
300 CAPSULE ORAL DAILY
Status: DISCONTINUED | OUTPATIENT
Start: 2020-09-14 | End: 2020-09-15

## 2020-09-13 RX ORDER — RISPERIDONE 0.25 MG/1
0.5 TABLET, FILM COATED ORAL 2 TIMES DAILY
Status: DISCONTINUED | OUTPATIENT
Start: 2020-09-13 | End: 2020-09-15

## 2020-09-13 RX ORDER — OFLOXACIN 3 MG/ML
1 SOLUTION/ DROPS OPHTHALMIC 4 TIMES DAILY
Status: DISCONTINUED | OUTPATIENT
Start: 2020-09-13 | End: 2020-09-13

## 2020-09-13 NOTE — PLAN OF CARE
Alert & oriented x3. Disoriented  Tele, NSR  RA  Patient denies pain. Hx of CVA w/ Left Hemiplegia, Left domingo-inattention, L facial droop, LUE/LLE flaccid. LUE in splint.   No acute neurological changes  Left heel stage 2 ulcer & surrounding deep tissue

## 2020-09-13 NOTE — PLAN OF CARE
Problem: Impaired Swallowing  Goal: Minimize aspiration risk  Description  Interventions:  - Cut food into small pieces  - Patient should be alert and upright for all feedings (90 degrees preferred)  - Offer food and liquids at a slow rate  - Encourage s

## 2020-09-13 NOTE — PLAN OF CARE
Assumed care of pt.  At 1  A&O x 3- not always oriented to time  Slow responses; left side flaccid; left facial droop-baseline  Room Air  NSR on telemetry  Abdomen soft round non-tender  No c/o pain- speech eval planned for tomorrow  Stage 2 on sacrum- m

## 2020-09-13 NOTE — SLP NOTE
ADULT SWALLOWING EVALUATION    ASSESSMENT    ASSESSMENT/OVERALL IMPRESSION:  Pt seen this morning for BSSE to r/o aspiration. Nurse approved this evaluation.  Pt's daughter was present.     Pt is a 64 y/o M who was admitted to [facility] on 9/12/2020 w symptoms at home. Further recommendations to be made at that time. Compensatory Strategies Recommended: Slow rate;Small bites and sips  Aspiration Precautions: Upright position; Slow rate;Small bites and sips  Medication Administration Recommendations: O 8/25/2016    [place] repair at [facility] 2/22/2017   • Incontinence    • Nontraumatic intracerebral hemorrhage in hemisphere, subcortical [facility])    • Personal history of malignant neoplasm of prostate    • Platelet dysfunction due to drugs 3/2/2 note: Silent aspiration cannot be evaluated clinically.  Videofluoroscopic Swallow Study is required to rule-out silent aspiration.)    Esophageal Phase of Swallow: No complaints consistent with possible esophageal involvement    GOALS  Goal #1 The patient

## 2020-09-13 NOTE — PROGRESS NOTES
[facility]  Progress Note     Kayla Davila Patient Status:  Observation    1950 MRN [id number]   [facility] 7NE-A Attending Kathi Gill MD   Hosp Day # 0 PCP Rochelle Apodaca DO     Chief Complaint: dysphagia     S: Patient Clopidogrel Bisulfate  75 mg Oral Daily   • tamsulosin HCl  0.4 mg Oral Daily   • traZODone HCl  150 mg Oral Nightly   • Sertraline HCl  200 mg Oral Daily   • enoxaparin  40 mg Subcutaneous Daily   • Insulin Aspart Pen  1-5 Units Subcutaneous TID CC and HS

## 2020-09-13 NOTE — PHYSICAL THERAPY NOTE
PHYSICAL THERAPY QUICK EVALUATION - INPATIENT    Room Number: 1895/4446-B  Evaluation Date: 9/13/2020  Presenting Problem: coughing, swallowing issues  Physician Order: PT Eval and Treat     Pt is 65year old male admitted on 9/12/2020 from home with c/o [place] repair at [facility] 2/22/2017   • Incontinence    • Nontraumatic intracerebral hemorrhage in hemisphere, subcortical [facility])    • Personal history of malignant neoplasm of prostate    • Platelet dysfunction due to drugs 3/2/2019   • Probl apt with elevator with daughter, also caregiver to pt. Pt and daughter reports pt is non ambulatory. Pt stands, pivots to transfer to electric chair with assist from daughter.  Pt was at [facility] a month ago for urinary issues and per daughter noticed a (G-Code): CM      FUNCTIONAL ABILITY STATUS  Gait Assessment  Gait Assistance: Not tested           Stoop/Curb Assistance: Not tested       Skilled Therapy Provided: RN cleared pt for session. Pt received elevated supine in bed, agreeable to session.  Supin transfer At previous, functional level   Patient able to ambulate on level surfaces Unable before admission

## 2020-09-13 NOTE — OCCUPATIONAL THERAPY NOTE
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 User may not have seen previous data.)  Patient Regularly Uses: None    Prior Level of Function: history obtained from pt and Niravr, daughter.  Pt lives with his daughter who is Impairment: 79.59%  Standardized Score (AM-PAC Scale): 25.33  CMS Modifier (G-Code): CL    FUNCTIONAL TRANSFER ASSESSMENT  Supine to Sit : Dependent assistance  Sit to Stand: Dependent assistance    Skilled Therapy Provided: Pleasant.   Supine to sit total needs at this time. Patient discharged from Occupational Therapy services. Please re-order if a new functional limitation presents during this admission.     Patient was able to achieve the following goals:  Patient able to toilet transfer: at previous fu

## 2020-09-13 NOTE — PLAN OF CARE
Pt was assisted patient back to bed, notified by PCT, arm bleeding  New L arm skin tear, small amount of bleeding, Dr. Maria Eugenia Finn notified  Cleansed with saline, antibiotic ointment, mepilex  Daughter, Luevenia Nageotte notified  Skin breakdown prevention implemented

## 2020-09-14 ENCOUNTER — APPOINTMENT (OUTPATIENT)
Dept: GENERAL RADIOLOGY | Facility: HOSPITAL | Age: 70
End: 2020-09-14
Attending: INTERNAL MEDICINE
Payer: MEDICARE

## 2020-09-14 LAB
ALBUMIN SERPL-MCNC: 1.8 G/DL (ref 3.4–5)
ALBUMIN/GLOB SERPL: 0.8 {RATIO} (ref 1–2)
ALP LIVER SERPL-CCNC: 82 U/L (ref 45–117)
ALT SERPL-CCNC: 101 U/L (ref 16–61)
ANION GAP SERPL CALC-SCNC: 5 MMOL/L (ref 0–18)
AST SERPL-CCNC: 78 U/L (ref 15–37)
BILIRUB SERPL-MCNC: 0.3 MG/DL (ref 0.1–2)
BUN BLD-MCNC: 4 MG/DL (ref 7–18)
BUN/CREAT SERPL: 25 (ref 10–20)
CALCIUM BLD-MCNC: 6.1 MG/DL (ref 8.5–10.1)
CHLORIDE SERPL-SCNC: 120 MMOL/L (ref 98–112)
CO2 SERPL-SCNC: 21 MMOL/L (ref 21–32)
CREAT BLD-MCNC: 0.16 MG/DL (ref 0.7–1.3)
GLOBULIN PLAS-MCNC: 2.3 G/DL (ref 2.8–4.4)
GLUCOSE BLD-MCNC: 125 MG/DL (ref 70–99)
GLUCOSE BLD-MCNC: 129 MG/DL (ref 70–99)
GLUCOSE BLD-MCNC: 171 MG/DL (ref 70–99)
GLUCOSE BLD-MCNC: 216 MG/DL (ref 70–99)
GLUCOSE BLD-MCNC: 85 MG/DL (ref 70–99)
HAV IGM SER QL: 1.4 MG/DL (ref 1.6–2.6)
HGB BLD-MCNC: 8.9 G/DL (ref 13–17.5)
M PROTEIN MFR SERPL ELPH: 4.1 G/DL (ref 6.4–8.2)
OSMOLALITY SERPL CALC.SUM OF ELEC: 298 MOSM/KG (ref 275–295)
POTASSIUM SERPL-SCNC: 2.4 MMOL/L (ref 3.5–5.1)
SODIUM SERPL-SCNC: 146 MMOL/L (ref 136–145)

## 2020-09-14 PROCEDURE — 99225 SUBSEQUENT OBSERVATION CARE: CPT | Performed by: INTERNAL MEDICINE

## 2020-09-14 PROCEDURE — 74230 X-RAY XM SWLNG FUNCJ C+: CPT | Performed by: INTERNAL MEDICINE

## 2020-09-14 RX ORDER — POTASSIUM CHLORIDE 14.9 MG/ML
20 INJECTION INTRAVENOUS ONCE
Status: DISCONTINUED | OUTPATIENT
Start: 2020-09-14 | End: 2020-09-14

## 2020-09-14 RX ORDER — POTASSIUM CHLORIDE 20 MEQ/1
40 TABLET, EXTENDED RELEASE ORAL ONCE
Status: COMPLETED | OUTPATIENT
Start: 2020-09-14 | End: 2020-09-14

## 2020-09-14 RX ORDER — POTASSIUM CHLORIDE 750 MG/1
10 TABLET, EXTENDED RELEASE ORAL DAILY
Qty: 7 TABLET | Refills: 0 | Status: SHIPPED | OUTPATIENT
Start: 2020-09-14 | End: 2020-09-21

## 2020-09-14 RX ORDER — FAMOTIDINE 20 MG/1
20 TABLET ORAL 2 TIMES DAILY
Status: DISCONTINUED | OUTPATIENT
Start: 2020-09-15 | End: 2020-09-15

## 2020-09-14 RX ORDER — MELATONIN
325
Status: DISCONTINUED | OUTPATIENT
Start: 2020-09-14 | End: 2020-09-15

## 2020-09-14 RX ORDER — MAGNESIUM OXIDE 400 MG (241.3 MG MAGNESIUM) TABLET
400 TABLET DAILY
Qty: 7 TABLET | Refills: 0 | Status: SHIPPED | OUTPATIENT
Start: 2020-09-14 | End: 2020-09-21

## 2020-09-14 NOTE — PLAN OF CARE
Assumed care 1900. Pt A&Ox3. Forgetful at times   NSR on tele. RA. Has wet, non-productive cough. Denies any SOB or CP  Flaccid on L side- baseline per old stroke   Left arm splint in place. Arm elevated on pillow.    Incontinent- changed and repositioned

## 2020-09-14 NOTE — VIDEO SWALLOW STUDY NOTE
ADULT VIDEOFLUOROSCOPIC SWALLOWING STUDY    Admission Date: 9/12/2020  Evaluation Date: 09/14/20  Radiologist: Dr. 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   Noted mild/moderate pharyngeal residuals more so with liquid consistencie

## 2020-09-14 NOTE — PROGRESS NOTES
[facility]  Progress Note     Lawernce Igsel Patient Status:  Observation    1950 MRN [id number]   [facility] 7NE-A Attending Lisa York MD   Hosp Day # 0 PCP Autumn Issa DO     Chief Complaint: admitted for dysphagia in [address]     Medications:   • potassium chloride 40mEq IVPB (peripheral line)  40 mEq Intravenous Once    Followed by   • potassium chloride  20 mEq Intravenous Once   • magnesium sulfate  4 g Intravenous Once   • risperiDONE  0.5 mg Oral BID   • gabapentin

## 2020-09-14 NOTE — CM/SW NOTE
Pt is ready for d/c today. Pt lives with his dtr in an apartment on the 4th floor with an elevator. Pt is nonambulatory and uses an electric wheelchair. Pt has a history of a hemorrhagic stroke.   Pt is current with [facility] - resume order and updates

## 2020-09-14 NOTE — HOME CARE LIAISON
TNL rec'd referral from [facility] to offer hh on dc.  TNL spoke with dtr Bj Jones and ptnt is current with [facility]    Thanks  Porsha wu

## 2020-09-14 NOTE — CM/SW NOTE
Pt's dtr Emilia Samira is requesting Yanira PRASANTHKalyani [phone] to transport pt home today. [facility] who is scheduled to  pt today at 5:00pm.  PCS form completed and faxed to [facility] because pt has medicaid.

## 2020-09-14 NOTE — CM/SW NOTE
Dtr requesting [facility] to reschedule  time for 6:30pm tonight.   Called [facility] and they will  pt at 6:30pm.

## 2020-09-15 ENCOUNTER — APPOINTMENT (OUTPATIENT)
Dept: CT IMAGING | Facility: HOSPITAL | Age: 70
End: 2020-09-15
Attending: HOSPITALIST
Payer: MEDICARE

## 2020-09-15 VITALS
WEIGHT: 156.5 LBS | DIASTOLIC BLOOD PRESSURE: 57 MMHG | TEMPERATURE: 98 F | OXYGEN SATURATION: 100 % | HEART RATE: 97 BPM | RESPIRATION RATE: 16 BRPM | BODY MASS INDEX: 25 KG/M2 | SYSTOLIC BLOOD PRESSURE: 107 MMHG

## 2020-09-15 LAB
AMMONIA PLAS-MCNC: 20 UMOL/L (ref 11–32)
DEPRECATED RDW RBC AUTO: 53.8 FL (ref 35.1–46.3)
ERYTHROCYTE [DISTWIDTH] IN BLOOD BY AUTOMATED COUNT: 17.9 % (ref 11–15)
GLUCOSE BLD-MCNC: 127 MG/DL (ref 70–99)
GLUCOSE BLD-MCNC: 141 MG/DL (ref 70–99)
GLUCOSE BLD-MCNC: 163 MG/DL (ref 70–99)
HAV IGM SER QL: 2.4 MG/DL (ref 1.6–2.6)
HCT VFR BLD AUTO: 30.8 % (ref 39–53)
HGB BLD-MCNC: 9.2 G/DL (ref 13–17.5)
MCH RBC QN AUTO: 24.9 PG (ref 26–34)
MCHC RBC AUTO-ENTMCNC: 29.9 G/DL (ref 31–37)
MCV RBC AUTO: 83.2 FL (ref 80–100)
PLATELET # BLD AUTO: 243 10(3)UL (ref 150–450)
POTASSIUM SERPL-SCNC: 4.2 MMOL/L (ref 3.5–5.1)
RBC # BLD AUTO: 3.7 X10(6)UL (ref 3.8–5.8)
WBC # BLD AUTO: 4.8 X10(3) UL (ref 4–11)

## 2020-09-15 PROCEDURE — 82728 ASSAY OF FERRITIN: CPT | Performed by: FAMILY MEDICINE

## 2020-09-15 PROCEDURE — 70450 CT HEAD/BRAIN W/O DYE: CPT | Performed by: HOSPITALIST

## 2020-09-15 PROCEDURE — 83550 IRON BINDING TEST: CPT | Performed by: FAMILY MEDICINE

## 2020-09-15 PROCEDURE — 83540 ASSAY OF IRON: CPT | Performed by: FAMILY MEDICINE

## 2020-09-15 PROCEDURE — 99217 OBSERVATION CARE DISCHARGE: CPT | Performed by: INTERNAL MEDICINE

## 2020-09-15 NOTE — PLAN OF CARE
Assumed care of patient at 07:15 am.   Patient A/O x 3, drowsy at times. Daughter updated on recheck Hgb 9.2 level. Left side remains flaccid. Wound care evaluated patient.    Lt arm dressing, sacrum and left foot dressings changed by wound care, sharonda Educate and encourage patient/family in tolerated functional activity level and precautions during self-care     Outcome: Adequate for Discharge

## 2020-09-15 NOTE — PROGRESS NOTES
Assumed care of patient at [address]. Patient drowsy but arousable. Hemoglobin level decreased from previous result. Doctor and daughter updated. Daughter requested patient's home iron medication to be reordered. Order received.  Plan for recheck of CBC in the A

## 2020-09-15 NOTE — PROGRESS NOTES
[facility] Pharm[facility] Note: Route Optimization for Famotidine (PEPCID)    Patient is currently on Famotidine (PEPCID) 20 mg IV every 12 hours.    The patient meets the criteria to convert to the oral equivalent as established by the IV to Oral conversion protocol ap

## 2020-09-15 NOTE — PROGRESS NOTES
[facility]  Progress Note     Israel Ford Patient Status:  Observation    1950 MRN [id number]   [facility] 7NE-A Attending Jacklyn Penny MD   Hosp Day # 0 PCP Lucas Lawson DO     Chief Complaint: AMS     S: Patient had RR (L)).    No results for input(s): PTP, INR in the last 168 hours. No results for input(s): TROP, CK in the last 168 hours. Imaging: Imaging data reviewed in Epic.     Medications:   • famotidine  20 mg Oral BID   • ferrous sulfate  325 mg Oral Da

## 2020-09-15 NOTE — PROGRESS NOTES
[facility]  RAPID RESPONSE NOTE     Amari Panda Patient Status:  Observation    1950 MRN [id number]   [facility] 7NE-A Attending Ludin Dodd MD   Hosp Day # 0 PCP Dedrick Hedrick DO     Reason for RRT: RRT called due to un 23.7

## 2020-09-15 NOTE — CM/SW NOTE
Pt's d/c was held last night due to left arm skin tear which bled - wound care to see. D/C home today once wound care sees pt.

## 2020-09-15 NOTE — PROGRESS NOTES
NURSING DISCHARGE NOTE    Discharge AVS completed. Patient cleared to discharge home per all consults. Discharge instructions and orders given to EMS transporter to take to patient's daughter/ patient's home.    Report called and given to patient's da

## 2020-09-15 NOTE — DISCHARGE SUMMARY
[facility]OS[facility]ITAL[facility]  DISCHARGE SUMMARY     Luz Marina Sims Patient Status:  Observation    1950 MRN [id number]   [facility] 7NE-A Attending Pernell Castleman, MD   [facility] Day # 0 PCP Shailesh Barth DO     Date of Admission: 2020  Date of unresponsive while sleeping which was transient. His work up negative and thought to be due to meds. He is back to his baseline mentation. He is clear for DC and will be going home.      Lace+ Score: 69  59-90 High Risk  29-58 Medium Risk  0-28   Low Risk Instructions Prescription details   Accu-Chek Guide Strp      TEST THREE TIMES DAILY   Quantity:  100 strip  Refills:  11     Accu-Chek Guide w/Device Kit      USE TO TEST BS TID   Refills:  0     Acetaminophen 500 MG Caps      Take 1 capsule (500 mg total injections daily   Quantity:  200 Box  Refills:  1     ipratropium-albuterol 0.5-2.5 (3) MG/3ML Soln  Commonly known as:  DUONEB      Take 3 mL by nebulization 4 (four) times daily as needed.    Quantity:  60 mL  Refills:  1     Lancets Misc      Inject 1 a TRICIA Carlson    Patient Instructions: You must be in the state of [place] during the virtual visit.      Please use the [employer] Mobile Liss and launch the video visit 10 minutes prior to your scheduled appointment time to ensure your camera and m

## 2020-09-15 NOTE — PLAN OF CARE
Assumed care @ 0700. Pt a/o x3, drowsy most of the shift, arousable. VSS. Tele SR. No acute respiratory distress noted. Denies any pain. Pt rested in bed. Brief changed, pericare performed by PCT.   Video swallow this AM, speech therapy recommen

## 2020-09-15 NOTE — CONSULTS
[facility]  Report of Inpatient Wound Care Consultation     Kesha Lizama Patient Status:  Observation    1950 MRN [id number]   [facility] 7NE-A Attending Samantha Byrne MD   Hosp Day # 0 PCP Trini Bose DO     REASON FOR [place] neoplasm of prostate    • Platelet dysfunction due to drugs 3/2/2019   • Problems with swallowing    • Stroke [facility])    • TIA (transient ischemic attack) 8/25/2016   • Tracheostomy status (HCC)    • Type II or unspecified type diabetes mellitus without ment 9.2*  --   --    HCT 32.4*  --   --   --   --   --   --   --   --  30.8*  --   --    .0  --   --   --   --   --   --   --   --  243.0  --   --    K 3.8  --  3.4*  --  2.4*  --   --   --   --  4.2  --   --    CREATSERUM 0.49*  --  0.44*  --  0.16*  - [address] [place] [address], [facility] [address]  [phone]  Inpatient Wound Care Pager #[phone]

## 2020-09-16 ENCOUNTER — PATIENT OUTREACH (OUTPATIENT)
Dept: CASE MANAGEMENT | Age: 70
End: 2020-09-16

## 2020-09-16 ENCOUNTER — TELEPHONE (OUTPATIENT)
Dept: FAMILY MEDICINE CLINIC | Facility: CLINIC | Age: 70
End: 2020-09-16

## 2020-09-16 DIAGNOSIS — Z02.9 ENCOUNTERS FOR UNSPECIFIED ADMINISTRATIVE PURPOSE: ICD-10-CM

## 2020-09-16 DIAGNOSIS — I10 ESSENTIAL HYPERTENSION, BENIGN: ICD-10-CM

## 2020-09-16 DIAGNOSIS — I63.9 CEREBROVASCULAR ACCIDENT (CVA), UNSPECIFIED MECHANISM (HCC): ICD-10-CM

## 2020-09-16 DIAGNOSIS — R41.82 ALTERED MENTAL STATUS, UNSPECIFIED ALTERED MENTAL STATUS TYPE: Primary | ICD-10-CM

## 2020-09-16 DIAGNOSIS — Z79.4 TYPE 2 DIABETES MELLITUS WITH DIABETIC POLYNEUROPATHY, WITH LONG-TERM CURRENT USE OF INSULIN (HCC): ICD-10-CM

## 2020-09-16 DIAGNOSIS — I25.10 CORONARY ARTERY DISEASE INVOLVING NATIVE CORONARY ARTERY OF NATIVE HEART WITHOUT ANGINA PECTORIS: ICD-10-CM

## 2020-09-16 DIAGNOSIS — E11.42 TYPE 2 DIABETES MELLITUS WITH DIABETIC POLYNEUROPATHY, WITH LONG-TERM CURRENT USE OF INSULIN (HCC): ICD-10-CM

## 2020-09-16 PROBLEM — Z95.1 S/P CABG (CORONARY ARTERY BYPASS GRAFT): Status: ACTIVE | Noted: 2020-09-16

## 2020-09-16 PROCEDURE — 1111F DSCHRG MED/CURRENT MED MERGE: CPT

## 2020-09-16 NOTE — TELEPHONE ENCOUNTER
Noted  Patient will need hospital admission/TCM follow-up may be done by video visit not by virtual  So caretaker/daughter, Agnes Breaux will need to help with setting up her MyChart and how to use the video visit-there is difficulty previously. Please inform.

## 2020-09-16 NOTE — PROGRESS NOTES
Notified patient's daughter, [facility], on legal. She verbalized understanding of information.

## 2020-09-16 NOTE — TELEPHONE ENCOUNTER
Call received from [place], [address] [place] [address], [address]. Patient is resuming Home Health. Was seen in ER, admitted 9/12 for difficulty swallowing and low hgb. Patient was discharged home on 9/15. Home Health will send orders. NAFIAS Zaman.

## 2020-09-16 NOTE — CM/SW NOTE
09/15/20 1356   Discharge disposition   Expected discharge disposition Home-Health   Name of [address] [place] [address]  (Patient's Residence )   Home services after discharge Skilled home care   Discharge transportation Other (comment)  (

## 2020-09-17 ENCOUNTER — OFFICE VISIT (OUTPATIENT)
Dept: FAMILY MEDICINE CLINIC | Facility: CLINIC | Age: 70
End: 2020-09-17
Payer: MEDICARE

## 2020-09-17 VITALS
OXYGEN SATURATION: 97 % | HEART RATE: 81 BPM | DIASTOLIC BLOOD PRESSURE: 70 MMHG | SYSTOLIC BLOOD PRESSURE: 122 MMHG | RESPIRATION RATE: 18 BRPM | TEMPERATURE: 98 F

## 2020-09-17 DIAGNOSIS — S91.302A OPEN WOUND OF HEEL, LEFT, INITIAL ENCOUNTER: ICD-10-CM

## 2020-09-17 DIAGNOSIS — T78.3XXA IDIOPATHIC ANGIOEDEMA, INITIAL ENCOUNTER: ICD-10-CM

## 2020-09-17 DIAGNOSIS — G81.94 LEFT HEMIPARESIS (HCC): ICD-10-CM

## 2020-09-17 DIAGNOSIS — Z91.09 ENVIRONMENTAL ALLERGIES: ICD-10-CM

## 2020-09-17 DIAGNOSIS — I73.9 PVD (PERIPHERAL VASCULAR DISEASE) (HCC): ICD-10-CM

## 2020-09-17 DIAGNOSIS — I25.10 CORONARY ARTERY DISEASE INVOLVING NATIVE CORONARY ARTERY OF NATIVE HEART WITHOUT ANGINA PECTORIS: ICD-10-CM

## 2020-09-17 DIAGNOSIS — R74.8 ELEVATED LIVER ENZYMES: ICD-10-CM

## 2020-09-17 DIAGNOSIS — Q27.30 ARTERIOVENOUS MALFORMATION (AVM): ICD-10-CM

## 2020-09-17 DIAGNOSIS — Z79.4 TYPE 2 DIABETES MELLITUS WITH DIABETIC POLYNEUROPATHY, WITH LONG-TERM CURRENT USE OF INSULIN (HCC): ICD-10-CM

## 2020-09-17 DIAGNOSIS — Z95.1 S/P CABG (CORONARY ARTERY BYPASS GRAFT): ICD-10-CM

## 2020-09-17 DIAGNOSIS — R05.8 DRY COUGH: ICD-10-CM

## 2020-09-17 DIAGNOSIS — E87.6 HYPOKALEMIA: ICD-10-CM

## 2020-09-17 DIAGNOSIS — F51.01 PRIMARY INSOMNIA: ICD-10-CM

## 2020-09-17 DIAGNOSIS — E11.42 TYPE 2 DIABETES MELLITUS WITH DIABETIC POLYNEUROPATHY, WITH LONG-TERM CURRENT USE OF INSULIN (HCC): ICD-10-CM

## 2020-09-17 DIAGNOSIS — R13.19 ESOPHAGEAL DYSPHAGIA: Primary | ICD-10-CM

## 2020-09-17 DIAGNOSIS — T17.908A ASPIRATION INTO AIRWAY, INITIAL ENCOUNTER: ICD-10-CM

## 2020-09-17 DIAGNOSIS — R41.82 ALTERED MENTAL STATUS, UNSPECIFIED ALTERED MENTAL STATUS TYPE: ICD-10-CM

## 2020-09-17 DIAGNOSIS — D50.0 IRON DEFICIENCY ANEMIA DUE TO CHRONIC BLOOD LOSS: ICD-10-CM

## 2020-09-17 DIAGNOSIS — I63.89 CEREBROVASCULAR ACCIDENT (CVA) DUE TO OTHER MECHANISM (HCC): ICD-10-CM

## 2020-09-17 DIAGNOSIS — K21.9 GASTROESOPHAGEAL REFLUX DISEASE, ESOPHAGITIS PRESENCE NOT SPECIFIED: ICD-10-CM

## 2020-09-17 DIAGNOSIS — F32.1 CURRENT MODERATE EPISODE OF MAJOR DEPRESSIVE DISORDER WITHOUT PRIOR EPISODE (HCC): ICD-10-CM

## 2020-09-17 DIAGNOSIS — S31.000A WOUND OF SACRAL REGION, INITIAL ENCOUNTER: ICD-10-CM

## 2020-09-17 DIAGNOSIS — E83.42 HYPOMAGNESEMIA: ICD-10-CM

## 2020-09-17 DIAGNOSIS — Z87.19 HISTORY OF GI BLEED: ICD-10-CM

## 2020-09-17 LAB
DEPRECATED HBV CORE AB SER IA-ACNC: 40.4 NG/ML (ref 30–530)
IRON SATURATION: 5 % (ref 20–50)
IRON SERPL-MCNC: 18 UG/DL (ref 65–175)
TOTAL IRON BINDING CAPACITY: 335 UG/DL (ref 240–450)
TRANSFERRIN SERPL-MCNC: 225 MG/DL (ref 200–360)

## 2020-09-17 PROCEDURE — 99495 TRANSJ CARE MGMT MOD F2F 14D: CPT | Performed by: FAMILY MEDICINE

## 2020-09-17 PROCEDURE — 1111F DSCHRG MED/CURRENT MED MERGE: CPT | Performed by: FAMILY MEDICINE

## 2020-09-17 PROCEDURE — 3008F BODY MASS INDEX DOCD: CPT | Performed by: FAMILY MEDICINE

## 2020-09-17 PROCEDURE — 3078F DIAST BP <80 MM HG: CPT | Performed by: FAMILY MEDICINE

## 2020-09-17 PROCEDURE — 3074F SYST BP LT 130 MM HG: CPT | Performed by: FAMILY MEDICINE

## 2020-09-17 RX ORDER — CALCIUM CARBONATE 300MG(750)
400 TABLET,CHEWABLE ORAL DAILY
COMMUNITY
Start: 2020-09-16 | End: 2020-09-21

## 2020-09-17 RX ORDER — CEPHALEXIN 500 MG/1
500 CAPSULE ORAL
COMMUNITY
Start: 2020-06-18 | End: 2020-09-20 | Stop reason: ALTCHOICE

## 2020-09-17 RX ORDER — TRAMADOL HYDROCHLORIDE 50 MG/1
50 TABLET ORAL 4 TIMES DAILY PRN
COMMUNITY
End: 2020-11-09 | Stop reason: DRUGHIGH

## 2020-09-17 RX ORDER — CEFPODOXIME PROXETIL 200 MG/1
200 TABLET, FILM COATED ORAL 2 TIMES DAILY
COMMUNITY
Start: 2020-08-08 | End: 2020-09-20 | Stop reason: ALTCHOICE

## 2020-09-17 RX ORDER — ONDANSETRON 4 MG/1
TABLET, ORALLY DISINTEGRATING ORAL
COMMUNITY
Start: 2020-06-29 | End: 2020-09-17

## 2020-09-17 RX ORDER — FLUTICASONE PROPIONATE 50 MCG
2 SPRAY, SUSPENSION (ML) NASAL DAILY
Qty: 3 BOTTLE | Refills: 3 | Status: SHIPPED | OUTPATIENT
Start: 2020-09-17 | End: 2021-09-12

## 2020-09-17 RX ORDER — ALBUTEROL SULFATE 2.5 MG/3ML
SOLUTION RESPIRATORY (INHALATION)
Qty: 75 ML | Refills: 3 | Status: SHIPPED | OUTPATIENT
Start: 2020-09-17

## 2020-09-17 RX ORDER — DOCUSATE SODIUM 100 MG/1
100 CAPSULE, LIQUID FILLED ORAL 2 TIMES DAILY
Qty: 180 CAPSULE | Refills: 3 | Status: SHIPPED | OUTPATIENT
Start: 2020-09-17

## 2020-09-17 RX ORDER — FERROUS SULFATE TAB EC 324 MG (65 MG FE EQUIVALENT) 324 (65 FE) MG
324 TABLET DELAYED RESPONSE ORAL DAILY
Qty: 90 TABLET | Refills: 3 | Status: CANCELLED | OUTPATIENT
Start: 2020-09-17 | End: 2020-10-17

## 2020-09-17 RX ORDER — POTASSIUM CHLORIDE 750 MG/1
10 TABLET, FILM COATED, EXTENDED RELEASE ORAL DAILY
COMMUNITY
Start: 2020-09-16 | End: 2020-09-21

## 2020-09-17 RX ORDER — OXYBUTYNIN CHLORIDE 5 MG/1
5 TABLET ORAL 2 TIMES DAILY
COMMUNITY
Start: 2019-09-24

## 2020-09-17 RX ORDER — CETIRIZINE HYDROCHLORIDE 10 MG/1
10 TABLET ORAL DAILY
COMMUNITY
Start: 2018-12-15 | End: 2020-09-17 | Stop reason: ALTCHOICE

## 2020-09-17 RX ORDER — OMEPRAZOLE 20 MG/1
CAPSULE, DELAYED RELEASE ORAL
Qty: 30 CAPSULE | Refills: 6 | Status: SHIPPED | OUTPATIENT
Start: 2020-09-17 | End: 2021-01-27

## 2020-09-17 NOTE — PATIENT INSTRUCTIONS
· Repeat labs will attempt for nurse to draw in 1 week to recheck CBC, CMP, magnesium and potassium  · If liver enzymes are not improving or if iron continues to decrease will order  GI/gastroenterology consult  · Repeat magnesium in 1 week if normal will few pillows. This increases pressure on your stomach. It can make GERD worse. Watch your eating habits  Certain foods may increase the acid in your stomach. Or they may relax the lower esophageal sphincter. This makes GERD more likely.  It’s best to avoid medicines, such as those that:  · Reduce or neutralize stomach acids  · Control esophagus muscle spasms  · Treat an allergic disorder of the esophagus that is causing the problem  · Are injected into the esophagus to help symptoms  Esophageal dilation  Dil or nose. Severe symptoms of sneezing, nasal congestion, post-nasal drip, itching of the eyes, nose, throat and mouth, scratchy throat, and dry cough, or wheezing may also occur.   Home care  Seasonal allergy symptoms can be reduced by these measures:  · Sta drying effect on the lungs. If you notice this, stop the antihistamines, drink extra fluids and notify your healthcare provider. · If you have sinus congestion or drainage, a saline nasal rinse may give relief.  A saline nasal rinse lessens the swelling an content on 8/1/2019  © 7244-3988 [employer]. [facility], [address]. All rights reserved. This information is not intended as a substitute for professional medical care.  Always follow your healthcare professional's instruc block the release of histamine during the allergic response. They may work better when taken before symptoms develop.  Unless a prescription antihistamine was prescribed, you can take over-the-counter antihistamines that don't cause drowsiness.  Ask your ph or sinus pain; colored drainage from the nose  · Headaches  · You have asthma and your asthma symptoms don't respond to the usual doses of your medicine  · Cough with colored sputum (mucus)  · Fever of 100.4°F (38°C) or higher, or as directed by the health

## 2020-09-17 NOTE — PROGRESS NOTES
Patient presents with:  Hospital F/U: [facility] 9/12/20-9/15/20    HPI:    Peter Miles is a 65year old male here today for hospital follow up.        He was discharged from Inpatient hospital, [facility] to Home   Admission Date: 9/12/20   Discharge persistent throat clearing or wet gurgly vocal quality is noted. During hospital course patient had hypokalemia, hypo-magnesium, received replacement started on mag oxide.   His liver enzymes were also mildly elevated, statin was held with slight improveme 3/17/2017 at [facility]. Patient is alert but poor historian and speech is very garbled in Yi at times. Daughter and medical records provided most of the history.     Interactive contact within 2 business days post discharge first initiated on David days.    •  BACLOFEN 10 MG Oral Tab, TAKE 1 AND 1/2 TABLETS EVERY 8 HOURS    •  amLODIPine Besylate 5 MG Oral Tab, Take 1 tablet (5 mg total) by mouth daily. •  atorvastatin 20 MG Oral Tab, Take 1 tablet (20 mg total) by mouth nightly.     •  metFORMIN H (four) times daily as needed.     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), Hypo-osmolality and hyponatremia, Hyponatremia (3/15/2017), Idiopathic an diarrhea  MUSCULOSKELETAL: denies pain, normal range of motion of extremities  NEURO: denies headaches, denies dizziness, denies weakness  PSYCHE: denies depression or anxiety  HEMATOLOGIC: denies hx of anemia, or bruising, denies bleeding  ENDOCRINE: marquita upright 90 degrees  Feed small pieces with sips slowly  Any increase cough or gurgling with eating, contact physician immediately    Gastroesophageal reflux disease, esophagitis presence not specified  -    restart omeprazole 20 MG Oral Capsule Delayed Rel times  Unconfirmed by history  Trial fluticasone if helps allergies  Use AC, leave windows closed    Idiopathic angioedema, initial encounter  -     cetirizine 10 MG Oral Tab; Take 1 tablet (10 mg total) by mouth daily.  -     EPINEPHrine (EPIPEN 2-GIOVANI) 0. sulfate 325mg to BID if low iron stores  Increase docusate sodium bid  May need to restart miralax if constipation on higher iron    Hypomagnesemia  -     MAGNESIUM;  Future  Continue magnesium supplementation for 1 week  Repeat magnesium level in 2 weeks While on iron - hold if diarrhea   • omeprazole 20 MG Oral Capsule Delayed Release 30 capsule 6     Si tab po q am on empty stomach 45mins AC breakfast   • albuterol sulfate (2.5 MG/3ML) 0.083% Inhalation Nebu Soln 75 mL 3     Si ampule every 4-6 h

## 2020-09-17 NOTE — PROGRESS NOTES
Initial Post Discharge Follow Up   Discharge Date: 9/15/20  Contact Date: 9/17/2020    Consent Verification:  Assessment Completed With: Caregiver: Debra Gargan received per patient?  written  HIPAA Verified?   Yes    Discharge Dx:    [address] [facility] denies any increased fatigue (reports patient has been alert throughout the day), muscle cramping, or palpitations/ irregular heart beats. The daughter reports the sacral wound has completely resolved.  The daughter also reports the left arm and heel are he arm. You may consider holding plavix if you note bleeding from left arm and resume within 2 days.      Wound care to the following to be done every 48 hours or prn: Remove dressings, cleanse with saline.     L anterior forearm:  Adaptic, gauze, ABD pad, ker Oral Tab CR Take 1 tablet (10 mEq total) by mouth daily for 7 days. 7 tablet 0   • magnesium oxide 400 MG Oral Tab Take 1 tablet (400 mg total) by mouth daily for 7 days.  7 tablet 0   • BACLOFEN 10 MG Oral Tab TAKE 1 AND 1/2 TABLETS EVERY 8 HOURS 135 table Nausea.  ) 30 tablet 2   • ipratropium-albuterol 0.5-2.5 (3) MG/3ML Inhalation Solution Take 3 mL by nebulization 4 (four) times daily as needed.  60 mL 1   • Ferrous Sulfate (FERROUSUL) 325 (65 Fe) MG Oral Tab Take 1 tablet (325 mg total) by mouth daily wi patient also has assistance of a caregiver at home. Home Health/Services ordered at D/C? Yes   What services:   Rehab, CHF, Stroke, PT, OT, Speech, Other: RN/PT/OT/ST  (NCM) (If HH was ordered) Has HH been set up? Yes;  The patient was seen by the RN ye to contact providers vs go to the ED/call 911. Appointments were reviewed and stressed the importance of a close follow up with providers. HH orders were confirmed and scheduled. Confirmed patient has DME and understands proper use.  The patient's daughter

## 2020-09-20 ENCOUNTER — TELEPHONE (OUTPATIENT)
Dept: FAMILY MEDICINE CLINIC | Facility: CLINIC | Age: 70
End: 2020-09-20

## 2020-09-20 PROBLEM — T17.908A ASPIRATION INTO AIRWAY, INITIAL ENCOUNTER: Status: RESOLVED | Noted: 2020-09-12 | Resolved: 2020-09-20

## 2020-09-20 PROBLEM — M25.512 ACUTE PAIN OF LEFT SHOULDER: Status: RESOLVED | Noted: 2019-08-15 | Resolved: 2020-09-20

## 2020-09-20 PROBLEM — T17.908A ASPIRATION INTO AIRWAY: Status: RESOLVED | Noted: 2020-09-12 | Resolved: 2020-09-20

## 2020-09-20 RX ORDER — EPINEPHRINE 0.3 MG/.3ML
0.3 INJECTION SUBCUTANEOUS AS NEEDED
Qty: 1 EACH | Refills: 1 | Status: SHIPPED | OUTPATIENT
Start: 2020-09-20

## 2020-09-20 RX ORDER — CETIRIZINE HYDROCHLORIDE 10 MG/1
10 TABLET ORAL DAILY
Qty: 90 TABLET | Refills: 3 | Status: SHIPPED | OUTPATIENT
Start: 2020-09-20

## 2020-09-20 NOTE — TELEPHONE ENCOUNTER
Daughter reported patient with seasonal allergies and patient concurred but his speech is garbled and he is difficult to understand  Reviewed care everywhere and allergist note only documented idiopathic angioedema starting in 2015 after suspected ACE inhi

## 2020-09-21 ENCOUNTER — OFFICE VISIT (OUTPATIENT)
Dept: OPHTHALMOLOGY | Age: 70
End: 2020-09-21

## 2020-09-21 ENCOUNTER — TELEPHONE (OUTPATIENT)
Dept: FAMILY MEDICINE CLINIC | Facility: CLINIC | Age: 70
End: 2020-09-21

## 2020-09-21 DIAGNOSIS — E11.9 TYPE 2 DIABETES MELLITUS WITHOUT RETINOPATHY (CMD): Primary | ICD-10-CM

## 2020-09-21 DIAGNOSIS — H25.13 SENILE NUCLEAR CATARACT, BILATERAL: ICD-10-CM

## 2020-09-21 DIAGNOSIS — K55.20 AVM (ARTERIOVENOUS MALFORMATION) OF COLON WITHOUT HEMORRHAGE: ICD-10-CM

## 2020-09-21 PROCEDURE — 92014 COMPRE OPH EXAM EST PT 1/>: CPT | Performed by: OPHTHALMOLOGY

## 2020-09-21 RX ORDER — FERROUS SULFATE 325(65) MG
325 TABLET ORAL 2 TIMES DAILY
Qty: 180 TABLET | Refills: 2 | Status: SHIPPED | OUTPATIENT
Start: 2020-09-21 | End: 2021-01-27

## 2020-09-21 NOTE — PROGRESS NOTES
Iron stores are low. Patient is taking iron sulfate once daily.   Patient should increase iron sulfate to twice daily  May need to start MiraLAX daily for constipation prevention  [place] should continue docusate sodium 100 mg twice daily  Repeat CBC will b

## 2020-09-21 NOTE — TELEPHONE ENCOUNTER
Daughter notified regarding zyrtec and epi pen. She reports this is old information.  He does not need the [facility]    opthalmology referral faxd, conf rec'd

## 2020-09-21 NOTE — TELEPHONE ENCOUNTER
Patients daughter called and said that they were at the [facility] and still no referral was there.

## 2020-09-21 NOTE — TELEPHONE ENCOUNTER
LMOM to return call to the office. Provided pt office phone [phone] along with office hours given.

## 2020-09-21 NOTE — TELEPHONE ENCOUNTER
----- Message from Korin Bryant DO sent at 9/20/2020  9:31 PM CDT -----  Iron stores are low. Patient is taking iron sulfate once daily.   Patient should increase iron sulfate to twice daily  May need to start MiraLAX daily for constipation prevention  R

## 2020-09-21 NOTE — TELEPHONE ENCOUNTER
Called [facility] to place lab orders. They requested lab orders to be faxed. Labs faxed.  Conf rec'd    CMP due 9/28/2020  CBC and MG due 10/5/2020

## 2020-09-22 ENCOUNTER — TELEPHONE (OUTPATIENT)
Dept: FAMILY MEDICINE CLINIC | Facility: CLINIC | Age: 70
End: 2020-09-22

## 2020-09-22 NOTE — TELEPHONE ENCOUNTER
No, they cannot.  The orders are to be 1 week apart per MD.   This was written on the orders that were faxed to [facility] 9/21/2020    detailed message left on Matt[facility] cell

## 2020-09-22 NOTE — TELEPHONE ENCOUNTER
Received orders from [facility] order dates 9/14/2020 and 9//16/2020  Reviewed and signed by PCP   Faxed to [facility] ,conf rec'd on 9/21/2020  Copy to scanning

## 2020-09-22 NOTE — TELEPHONE ENCOUNTER
Mustapha nurse with [address] [place] [address] called office stating pt has a lab order for 09/28/2020 for CMP. Anjum Martin wants to know if the additional orders for CBC magnesium can also be done on that day so pt does not get poked 2 times.

## 2020-09-22 NOTE — TELEPHONE ENCOUNTER
LMOM to return call to the office as this is my 2nd attempt to try to reach you. Provided pt office phone [phone] along with office hours given.

## 2020-09-23 ENCOUNTER — TELEPHONE (OUTPATIENT)
Dept: GASTROENTEROLOGY | Age: 70
End: 2020-09-23

## 2020-09-23 ENCOUNTER — TELEPHONE (OUTPATIENT)
Dept: FAMILY MEDICINE CLINIC | Facility: CLINIC | Age: 70
End: 2020-09-23

## 2020-09-23 NOTE — TELEPHONE ENCOUNTER
Call placed to Dr. Alm Simmonds office.  will forward message to clinical staff who will reach out to us with follow-up recommendations, and fax office notes.

## 2020-09-23 NOTE — TELEPHONE ENCOUNTER
Copied from care everywhere    \"  \"Dwain Penaloza., MD - 01/17/2017 3:44 AM CST  Formatting of this note may be different from the original.  PATIENT NAME: Radha Denton OF SERVICE: 01/16/2017    Page 2      HISTORY OF PRESENT ILLNESS:

## 2020-09-24 ENCOUNTER — TELEPHONE (OUTPATIENT)
Dept: FAMILY MEDICINE CLINIC | Facility: CLINIC | Age: 70
End: 2020-09-24

## 2020-09-24 NOTE — TELEPHONE ENCOUNTER
Received fax from [address] [place] [address] with interventions and updated of visit of 1 visit every 2 weeks 9/21/2020-10/3/2020 for SLP    OT 1 visit every 1 week  9/18/2020-9/19/2020    Singed by PCP, faxed, conf rec'd  Copy to scanning

## 2020-09-29 NOTE — TELEPHONE ENCOUNTER
Spoke with Angely and she reports Abner Nagy has been taking iron sulfate twice daily since hospital discharge+MAR is updated     She is giving cloace twice daily and has miralax as needed

## 2020-09-29 NOTE — TELEPHONE ENCOUNTER
Called [facility] to have CBC lab orders repeated this around 10/1/2020 or just after    LM for office and nurse will call back

## 2020-09-30 ENCOUNTER — TELEPHONE (OUTPATIENT)
Dept: OPHTHALMOLOGY | Age: 70
End: 2020-09-30

## 2020-09-30 NOTE — TELEPHONE ENCOUNTER
Received records from Dr Alexy Teague office  Records show  GI procedure for Esophagogastroduodenoscopy colonoscopy and capsule endoscopy and DBE.   No indication for repeat colonoscopy  ( current records held in quality folder)    Called office to obtain i

## 2020-09-30 NOTE — TELEPHONE ENCOUNTER
Per TE 9/20/2020  CBC is to completed around 10/5/2020 week magnesium    [facility] sent message for RN to call to confirm lab orders he has pending which should be the CBC and Mg as stated above

## 2020-10-05 ENCOUNTER — TELEPHONE (OUTPATIENT)
Dept: FAMILY MEDICINE CLINIC | Facility: CLINIC | Age: 70
End: 2020-10-05

## 2020-10-05 NOTE — TELEPHONE ENCOUNTER
Received Physician Certification Statement form from [facility] for non emergency transports  Faxed to jermaine Wesley rec'd  Copy to scanning

## 2020-10-07 ENCOUNTER — APPOINTMENT (OUTPATIENT)
Dept: GENERAL RADIOLOGY | Facility: HOSPITAL | Age: 70
End: 2020-10-07
Attending: STUDENT IN AN ORGANIZED HEALTH CARE EDUCATION/TRAINING PROGRAM
Payer: MEDICARE

## 2020-10-07 ENCOUNTER — HOSPITAL ENCOUNTER (EMERGENCY)
Facility: HOSPITAL | Age: 70
Discharge: HOME | End: 2020-10-08
Attending: STUDENT IN AN ORGANIZED HEALTH CARE EDUCATION/TRAINING PROGRAM
Payer: MEDICARE

## 2020-10-07 DIAGNOSIS — R05.9 COUGH: Primary | ICD-10-CM

## 2020-10-07 DIAGNOSIS — D64.9 ANEMIA, UNSPECIFIED TYPE: ICD-10-CM

## 2020-10-07 DIAGNOSIS — Z86.73 HISTORY OF CVA (CEREBROVASCULAR ACCIDENT): ICD-10-CM

## 2020-10-07 PROCEDURE — 85025 COMPLETE CBC W/AUTO DIFF WBC: CPT | Performed by: STUDENT IN AN ORGANIZED HEALTH CARE EDUCATION/TRAINING PROGRAM

## 2020-10-07 PROCEDURE — 80053 COMPREHEN METABOLIC PANEL: CPT

## 2020-10-07 PROCEDURE — 87077 CULTURE AEROBIC IDENTIFY: CPT | Performed by: STUDENT IN AN ORGANIZED HEALTH CARE EDUCATION/TRAINING PROGRAM

## 2020-10-07 PROCEDURE — 99283 EMERGENCY DEPT VISIT LOW MDM: CPT

## 2020-10-07 PROCEDURE — 83605 ASSAY OF LACTIC ACID: CPT

## 2020-10-07 PROCEDURE — 87040 BLOOD CULTURE FOR BACTERIA: CPT

## 2020-10-07 PROCEDURE — 80053 COMPREHEN METABOLIC PANEL: CPT | Performed by: STUDENT IN AN ORGANIZED HEALTH CARE EDUCATION/TRAINING PROGRAM

## 2020-10-07 PROCEDURE — 85025 COMPLETE CBC W/AUTO DIFF WBC: CPT

## 2020-10-07 PROCEDURE — 87150 DNA/RNA AMPLIFIED PROBE: CPT | Performed by: STUDENT IN AN ORGANIZED HEALTH CARE EDUCATION/TRAINING PROGRAM

## 2020-10-07 PROCEDURE — 99284 EMERGENCY DEPT VISIT MOD MDM: CPT

## 2020-10-07 PROCEDURE — 71045 X-RAY EXAM CHEST 1 VIEW: CPT | Performed by: STUDENT IN AN ORGANIZED HEALTH CARE EDUCATION/TRAINING PROGRAM

## 2020-10-07 PROCEDURE — 87040 BLOOD CULTURE FOR BACTERIA: CPT | Performed by: STUDENT IN AN ORGANIZED HEALTH CARE EDUCATION/TRAINING PROGRAM

## 2020-10-07 PROCEDURE — 83605 ASSAY OF LACTIC ACID: CPT | Performed by: STUDENT IN AN ORGANIZED HEALTH CARE EDUCATION/TRAINING PROGRAM

## 2020-10-07 PROCEDURE — 36415 COLL VENOUS BLD VENIPUNCTURE: CPT

## 2020-10-07 PROCEDURE — 84145 PROCALCITONIN (PCT): CPT | Performed by: STUDENT IN AN ORGANIZED HEALTH CARE EDUCATION/TRAINING PROGRAM

## 2020-10-07 RX ORDER — BENZONATATE 100 MG/1
100 CAPSULE ORAL 3 TIMES DAILY PRN
Qty: 30 CAPSULE | Refills: 0 | Status: SHIPPED | OUTPATIENT
Start: 2020-10-07 | End: 2020-11-06

## 2020-10-08 VITALS
TEMPERATURE: 99 F | SYSTOLIC BLOOD PRESSURE: 138 MMHG | WEIGHT: 158.75 LBS | RESPIRATION RATE: 18 BRPM | BODY MASS INDEX: 26 KG/M2 | HEART RATE: 95 BPM | OXYGEN SATURATION: 96 % | DIASTOLIC BLOOD PRESSURE: 77 MMHG

## 2020-10-08 NOTE — ED PROVIDER NOTES
Patient Seen in: [facility] Emergency Department      History   Patient presents with:  Cough/URI  Fever    Stated Complaint: cough, fever    HPI    Patient is a 22-year-old male who presents emergency department reporting cough for the last 2 days. Cystoscopy and TURP   • Hypo-osmolality and hyponatremia    • Hyponatremia 3/15/2017   • Idiopathic angioedema 5/5/2017   • Iliac artery aneurysm, left ([facility] [address] 8/25/2016    [place] repair [facility] Wills/Raj 2/22/2017   • Incontinence    • Nontraumatic HPI.  Constitutional and vital signs reviewed. All other systems reviewed and negative except as noted above.     Physical Exam     ED Triage Vitals [10/07/20 2146]   /77   Pulse 98   Resp 20   Temp 99.4 °F (37.4 °C)   Temp src Oral   SpO2 96 % Narrative: The following orders were created for panel order CBC WITH DIFFERENTIAL WITH PLATELET.   Procedure                               Abnormality         Status                     ---------                               -----------         ------ capsule, R-0

## 2020-10-08 NOTE — TELEPHONE ENCOUNTER
Received faxed order confirmation from [address] [place] [address] by PCP  Faxed  To [facility] , conf received   Copy to scanning

## 2020-10-09 DIAGNOSIS — I77.9 PERIPHERAL ARTERIAL OCCLUSIVE DISEASE (HCC): ICD-10-CM

## 2020-10-09 DIAGNOSIS — I71.4 ABDOMINAL AORTIC ANEURYSM (AAA) WITHOUT RUPTURE (HCC): ICD-10-CM

## 2020-10-09 DIAGNOSIS — I73.9 PERIPHERAL VASCULAR DISEASE (HCC): ICD-10-CM

## 2020-10-09 DIAGNOSIS — E78.2 MIXED HYPERLIPIDEMIA: ICD-10-CM

## 2020-10-09 RX ORDER — ATORVASTATIN CALCIUM 20 MG/1
20 TABLET, FILM COATED ORAL NIGHTLY
Qty: 90 TABLET | Refills: 0 | Status: SHIPPED | OUTPATIENT
Start: 2020-10-09 | End: 2020-12-09

## 2020-10-09 NOTE — TELEPHONE ENCOUNTER
Pt requesting refill of   Requested Prescriptions     Pending Prescriptions Disp Refills   • ATORVASTATIN 20 MG Oral Tab [Pharmacy Med Name: ATORVASTATIN CALCIUM 20 MG Tablet] 90 tablet 0     Sig: TAKE 1 TABLET (20 MG TOTAL) BY MOUTH NIGHTLY.      Failed pr

## 2020-10-09 NOTE — TELEPHONE ENCOUNTER
Called medical records as advised and was asked to complete a release of AUthorizaton form  And fax to [phone]  For OV notes after colonoscopy 11/2017    Form faxed, conf rec'd

## 2020-10-12 ENCOUNTER — TELEPHONE (OUTPATIENT)
Dept: FAMILY MEDICINE CLINIC | Facility: CLINIC | Age: 70
End: 2020-10-12

## 2020-10-12 RX ORDER — OFLOXACIN 3 MG/ML
SOLUTION/ DROPS OPHTHALMIC
COMMUNITY
Start: 2020-10-08

## 2020-10-12 NOTE — TELEPHONE ENCOUNTER
Received forms from [employer] for prescription/certificate of medical necessity for adult underwear and underpads.

## 2020-10-19 ENCOUNTER — TELEPHONE (OUTPATIENT)
Dept: FAMILY MEDICINE CLINIC | Facility: CLINIC | Age: 70
End: 2020-10-19

## 2020-10-19 NOTE — TELEPHONE ENCOUNTER
Received plan of care from [facility]. Completed, signed by PCP. Faxed with confirmation. Sent to scan.

## 2020-10-20 DIAGNOSIS — G81.94 LEFT HEMIPARESIS (HCC): ICD-10-CM

## 2020-10-20 DIAGNOSIS — E11.42 TYPE 2 DIABETES MELLITUS WITH DIABETIC POLYNEUROPATHY, WITH LONG-TERM CURRENT USE OF INSULIN (HCC): ICD-10-CM

## 2020-10-20 DIAGNOSIS — Z79.4 TYPE 2 DIABETES MELLITUS WITH DIABETIC POLYNEUROPATHY, WITH LONG-TERM CURRENT USE OF INSULIN (HCC): ICD-10-CM

## 2020-10-20 DIAGNOSIS — G57.93 NEUROPATHY INVOLVING BOTH LOWER EXTREMITIES: ICD-10-CM

## 2020-10-20 NOTE — TELEPHONE ENCOUNTER
Pt requesting refill of   Requested Prescriptions     Pending Prescriptions Disp Refills   • gabapentin 300 MG Oral Cap [Pharmacy Med Name: GABAPENTIN 300MG CAPSULES] 270 capsule 0     Sig: TAKE ONE CAPSULE BY MOUTH EVERY MORNING AND 2 CAPSULES BY MOUTH EV

## 2020-10-21 RX ORDER — GABAPENTIN 300 MG/1
CAPSULE ORAL
Qty: 270 CAPSULE | Refills: 0 | Status: SHIPPED | OUTPATIENT
Start: 2020-10-21 | End: 2020-11-25

## 2020-10-27 ENCOUNTER — TELEPHONE (OUTPATIENT)
Dept: FAMILY MEDICINE CLINIC | Facility: CLINIC | Age: 70
End: 2020-10-27

## 2020-10-27 NOTE — TELEPHONE ENCOUNTER
Spoke with Mustapha at [facility]. Pt was supposed to have a CBC and Magnesium level early October. We have not received results. Provided Mustapha the fax number for the office and he states he will fax the results over.

## 2020-11-02 NOTE — TELEPHONE ENCOUNTER
Results received. Per Autumn Issa, DO: Please call. Needs office visit- video visit to discuss labs (see scanned document). Abstracted and sent to scan.

## 2020-11-03 ENCOUNTER — APPOINTMENT (OUTPATIENT)
Dept: GENERAL RADIOLOGY | Facility: HOSPITAL | Age: 70
End: 2020-11-03
Attending: EMERGENCY MEDICINE
Payer: MEDICARE

## 2020-11-03 ENCOUNTER — HOSPITAL ENCOUNTER (EMERGENCY)
Facility: HOSPITAL | Age: 70
Discharge: HOME | End: 2020-11-04
Attending: EMERGENCY MEDICINE
Payer: MEDICARE

## 2020-11-03 DIAGNOSIS — J18.9 COMMUNITY ACQUIRED PNEUMONIA OF RIGHT UPPER LOBE OF LUNG: Primary | ICD-10-CM

## 2020-11-03 PROCEDURE — 99284 EMERGENCY DEPT VISIT MOD MDM: CPT

## 2020-11-03 PROCEDURE — 71045 X-RAY EXAM CHEST 1 VIEW: CPT | Performed by: EMERGENCY MEDICINE

## 2020-11-04 ENCOUNTER — TELEPHONE (OUTPATIENT)
Dept: FAMILY MEDICINE CLINIC | Facility: CLINIC | Age: 70
End: 2020-11-04

## 2020-11-04 VITALS
HEART RATE: 91 BPM | OXYGEN SATURATION: 96 % | BODY MASS INDEX: 26.46 KG/M2 | DIASTOLIC BLOOD PRESSURE: 67 MMHG | HEIGHT: 64.17 IN | TEMPERATURE: 98 F | WEIGHT: 155 LBS | RESPIRATION RATE: 18 BRPM | SYSTOLIC BLOOD PRESSURE: 111 MMHG

## 2020-11-04 NOTE — ED PROVIDER NOTES
Patient Seen in: [facility] Emergency Department      History   Patient presents with:  Cough/URI    Stated Complaint: Cough. @ [facility] last night    HPI    56-year-old male brought in by EMS for evaluation of cough.   Per EMS family reported that the p unspecified side ([facility]    • High grade prostatic intraepithelial neoplasia 10/27/2016    - Aug 29, 2016 Cystoscopy, Transurethral resection of bladder tumor - Oct 13, 2016 Cystoscopy/dilation - March 6, 2017 Cystoscopy and TURP   • Hypo-osmolality and hypo Smokeless tobacco: Never Used      Tobacco comment: quit 1976    Alcohol use: No    Drug use: No             Review of Systems    Positive for stated complaint: Cough. @ [facility] last night  Other systems are as noted in HPI.   Constitutional and vital signs re include infection, chronic infection and metastatic disease. Primary bone neoplasm/myeloma is also theoretically in the differential diagnosis and correlation with any relevant history is needed. Sternal wires. Normal cardiac size. Coronary stent.   Chava Darnell

## 2020-11-04 NOTE — TELEPHONE ENCOUNTER
Rodney Isaacs with [facility] called office stating [facility], and the Doctor did not want to sign the PCS because they believed they would be liable for anything that happened.  aurora cross

## 2020-11-04 NOTE — ED INITIAL ASSESSMENT (HPI)
Patient presents to ED for revaluation. Patient was at [facility] and diagnosed with pneumonia, placed on Augmentin yesterday and daughter wants patient re-evaluated because patients cough is getting worse.

## 2020-11-06 NOTE — TELEPHONE ENCOUNTER
Spoke with Augie at [facility]. Informed her that Dr. Marleni Vasquez will sign form.   Gave her our fax number and Augie will be faxing over today

## 2020-11-06 NOTE — TELEPHONE ENCOUNTER
Armida King from [facility] calling she will only be there today until [place].   Please call her at [phone]

## 2020-11-06 NOTE — TELEPHONE ENCOUNTER
Form received.  Filled out and signed by PCP   Faxed back to [place] at [phone]  Form sent to scanning

## 2020-11-09 ENCOUNTER — VIRTUAL PHONE E/M (OUTPATIENT)
Dept: FAMILY MEDICINE CLINIC | Facility: CLINIC | Age: 70
End: 2020-11-09
Payer: MEDICARE

## 2020-11-09 DIAGNOSIS — F33.1 MODERATE EPISODE OF RECURRENT MAJOR DEPRESSIVE DISORDER (HCC): ICD-10-CM

## 2020-11-09 DIAGNOSIS — I25.10 CORONARY ARTERY DISEASE INVOLVING NATIVE CORONARY ARTERY OF NATIVE HEART WITHOUT ANGINA PECTORIS: ICD-10-CM

## 2020-11-09 DIAGNOSIS — J18.0 BRONCHOPNEUMONIA: Primary | ICD-10-CM

## 2020-11-09 DIAGNOSIS — K59.01 SLOW TRANSIT CONSTIPATION: ICD-10-CM

## 2020-11-09 DIAGNOSIS — T78.3XXD IDIOPATHIC ANGIOEDEMA, SUBSEQUENT ENCOUNTER: ICD-10-CM

## 2020-11-09 DIAGNOSIS — G57.93 NEUROPATHY INVOLVING BOTH LOWER EXTREMITIES: ICD-10-CM

## 2020-11-09 DIAGNOSIS — M25.512 PAIN IN JOINT OF LEFT SHOULDER: ICD-10-CM

## 2020-11-09 DIAGNOSIS — K55.20 AVM (ARTERIOVENOUS MALFORMATION) OF COLON WITHOUT HEMORRHAGE: ICD-10-CM

## 2020-11-09 DIAGNOSIS — E83.42 HYPOMAGNESEMIA: ICD-10-CM

## 2020-11-09 DIAGNOSIS — N40.1 BENIGN PROSTATIC HYPERPLASIA (BPH) WITH URINARY URGE INCONTINENCE: ICD-10-CM

## 2020-11-09 DIAGNOSIS — M79.605 LEFT LEG PAIN: ICD-10-CM

## 2020-11-09 DIAGNOSIS — N39.41 BENIGN PROSTATIC HYPERPLASIA (BPH) WITH URINARY URGE INCONTINENCE: ICD-10-CM

## 2020-11-09 DIAGNOSIS — I25.5 CARDIOMYOPATHY, ISCHEMIC: ICD-10-CM

## 2020-11-09 DIAGNOSIS — I73.9 PVD (PERIPHERAL VASCULAR DISEASE) (HCC): ICD-10-CM

## 2020-11-09 DIAGNOSIS — E11.42 TYPE 2 DIABETES MELLITUS WITH DIABETIC POLYNEUROPATHY, WITHOUT LONG-TERM CURRENT USE OF INSULIN (HCC): ICD-10-CM

## 2020-11-09 DIAGNOSIS — F51.04 PSYCHOPHYSIOLOGICAL INSOMNIA: ICD-10-CM

## 2020-11-09 DIAGNOSIS — F41.9 ANXIETY: ICD-10-CM

## 2020-11-09 DIAGNOSIS — Z95.1 S/P CABG (CORONARY ARTERY BYPASS GRAFT): ICD-10-CM

## 2020-11-09 DIAGNOSIS — R13.19 ESOPHAGEAL DYSPHAGIA: ICD-10-CM

## 2020-11-09 DIAGNOSIS — I10 ESSENTIAL HYPERTENSION, BENIGN: ICD-10-CM

## 2020-11-09 DIAGNOSIS — R74.8 ELEVATED LIVER ENZYMES: ICD-10-CM

## 2020-11-09 DIAGNOSIS — Z87.09 HISTORY OF ASTHMA: ICD-10-CM

## 2020-11-09 DIAGNOSIS — G81.94 LEFT HEMIPARESIS (HCC): ICD-10-CM

## 2020-11-09 DIAGNOSIS — I71.00 DISSECTION OF AORTA, UNSPECIFIED PORTION OF AORTA (HCC): ICD-10-CM

## 2020-11-09 DIAGNOSIS — I61.0 NONTRAUMATIC SUBCORTICAL HEMORRHAGE OF RIGHT CEREBRAL HEMISPHERE (HCC): ICD-10-CM

## 2020-11-09 DIAGNOSIS — D50.0 IRON DEFICIENCY ANEMIA DUE TO CHRONIC BLOOD LOSS: ICD-10-CM

## 2020-11-09 DIAGNOSIS — I72.3 ILIAC ARTERY ANEURYSM, LEFT (HCC): ICD-10-CM

## 2020-11-09 DIAGNOSIS — Z95.5 S/P CORONARY ARTERY STENT PLACEMENT: ICD-10-CM

## 2020-11-09 DIAGNOSIS — I71.4 ABDOMINAL AORTIC ANEURYSM (AAA) WITHOUT RUPTURE (HCC): ICD-10-CM

## 2020-11-09 DIAGNOSIS — R29.898 MUSCULAR DECONDITIONING: ICD-10-CM

## 2020-11-09 DIAGNOSIS — K21.9 GASTROESOPHAGEAL REFLUX DISEASE, UNSPECIFIED WHETHER ESOPHAGITIS PRESENT: ICD-10-CM

## 2020-11-09 DIAGNOSIS — E78.2 MIXED HYPERLIPIDEMIA: ICD-10-CM

## 2020-11-09 PROBLEM — F32.9 MAJOR DEPRESSIVE DISORDER: Status: RESOLVED | Noted: 2019-06-25 | Resolved: 2020-11-09

## 2020-11-09 PROBLEM — R33.9 URINARY RETENTION: Status: RESOLVED | Noted: 2019-09-13 | Resolved: 2020-11-09

## 2020-11-09 PROBLEM — I62.9 INTRACRANIAL HEMORRHAGE (HCC): Status: RESOLVED | Noted: 2019-03-01 | Resolved: 2020-11-09

## 2020-11-09 PROBLEM — G44.89 OTHER HEADACHE SYNDROME: Status: RESOLVED | Noted: 2019-08-15 | Resolved: 2020-11-09

## 2020-11-09 PROBLEM — F51.01 PRIMARY INSOMNIA: Status: RESOLVED | Noted: 2019-09-30 | Resolved: 2020-11-09

## 2020-11-09 PROBLEM — R41.82 ALTERED MENTAL STATUS, UNSPECIFIED: Status: RESOLVED | Noted: 2020-09-16 | Resolved: 2020-11-09

## 2020-11-09 PROBLEM — S91.302A OPEN WOUND OF HEEL, LEFT, INITIAL ENCOUNTER: Status: RESOLVED | Noted: 2020-09-17 | Resolved: 2020-11-09

## 2020-11-09 PROBLEM — R05.8 DRY COUGH: Status: RESOLVED | Noted: 2020-09-17 | Resolved: 2020-11-09

## 2020-11-09 PROBLEM — I69.354 HEMIPARESIS AFFECTING LEFT SIDE AS LATE EFFECT OF STROKE (HCC): Status: RESOLVED | Noted: 2019-03-02 | Resolved: 2020-11-09

## 2020-11-09 PROCEDURE — 99443 PHONE E/M BY PHYS 21-30 MIN: CPT | Performed by: FAMILY MEDICINE

## 2020-11-09 RX ORDER — TRAZODONE HYDROCHLORIDE 300 MG/1
300 TABLET ORAL NIGHTLY
COMMUNITY
Start: 2020-11-03

## 2020-11-09 RX ORDER — AMOXICILLIN AND CLAVULANATE POTASSIUM 875; 125 MG/1; MG/1
1 TABLET, FILM COATED ORAL EVERY 12 HOURS
COMMUNITY
Start: 2020-11-04

## 2020-11-09 NOTE — PROGRESS NOTES
Due to COVID-19 ACTION PLAN, the patient's office visit was converted to a video visit.   Time Spent: 45 mins    Subjective     HPI:   Luz Marina Sims is a 65year old male who presents for [facility] for bronchopneumonia on 11/3/2020 and recurrent coughing f that his memory and ability to process continues to decline. Some days he is more alert and cohesive as he is today and others he will ask for the same questions over and over and then get frustrated when daughter tells him he already asked her that.   John twice daily iron and Colace as needed for chronic constipation. Has history of AVMs on prior colonoscopy. History of hypertension, hyperlipidemia controlled on medications.     History of asthma reported but I have found no documentation in [facility] Normal.  KIDNEYS:  Normal.  Right kidney measures 9.3 cm. Left kidney measures 10.6 cm. AORTA/IVC:  Normal.      =====  CONCLUSION:  No specific abnormality. Exam limited by bowel gas. Pancreas is not seen. Cholecystectomy seen. No biliary dilation. 10/08/2020   Component Date Value   • Hold Blue 10/07/2020 Auto Resulted    • Hold Lavender 10/07/2020 Auto Resulted    • Hold Lt Green 10/07/2020 Auto Resulted    • Hold Gold 10/07/2020 Auto Resulted    • Glucose 10/07/2020 122*   • Sodium 10/07/2020 134* Staphylococcus aureu* 10/07/2020 See Comment    Office Visit on 09/17/2020   Component Date Value   • Ferritin 09/15/2020 40.4    • Iron 09/15/2020 18*   • Transferrin 09/15/2020 225    • Total Iron Binding [place]* 09/15/2020 335    • % Saturation 09/15/202 09/12/2020 33.1    • Monocyte % 09/12/2020 9.1    • Eosinophil % 09/12/2020 3.3    • Basophil % 09/12/2020 0.6    • Immature Granulocyte % 09/12/2020 1.2    • SARS-CoV-2 (COVID-19) 09/12/2020 Not Detected    • HgbA1C 09/12/2020 5.8*   • Estimated Average G Bilirubin, Total 09/14/2020 0.3    • Total Protein 09/14/2020 4.1*   • Albumin 09/14/2020 1.8*   • Globulin  09/14/2020 2.3*   • A/G Ratio 09/14/2020 0.8*   • POC Glucose 09/14/2020 125*   • Magnesium 09/14/2020 1.4*   • POC Glucose 09/14/2020 129*   • POC Finalized by (CST): Shana Crain MD on 11/03/2020 at 11:14 PM             Assessment    Diagnoses and all orders for this visit:    Bronchopneumonia   resolved  Finish augmentin    Elevated liver enzymes  -     Cancel: [address] [facility] warranted    Coronary artery disease involving native coronary artery of native heart without angina pectoris  PVD (peripheral vascular disease) ([facility])  Dissection of aorta, unspecified portion of aorta ([facility])  S/P coronary artery stent placement  S/P CABG ( Nolvia Daley understands phone evaluation is not a substitute for face-to-face examination or emergency care. Patient advised to go to ER or call 911 for worsening symptoms or acute distress.      Patient/Caregiver Education: Patient/Caregiver Education: There a

## 2020-11-10 ENCOUNTER — TELEPHONE (OUTPATIENT)
Dept: FAMILY MEDICINE CLINIC | Facility: CLINIC | Age: 70
End: 2020-11-10

## 2020-11-10 NOTE — TELEPHONE ENCOUNTER
Hi, I had a [facility] home health therapist, Niranjan Kelly, call for a new order for pt. The order that was sent used Elevated liver enzymes as the diagnosis and Niranjan Kelly says that this is a sypmtom not a diagnosis.  Niranjan Kelly states you could use k76.0 ICD which is the fatty l

## 2020-11-11 NOTE — TELEPHONE ENCOUNTER
I apologize-he should have had multiple diagnosis is. I have edited the order with the appropriate diagnoses. Please inform and have palliative care update me if any adjustment is needed. Thank you.

## 2020-11-12 ENCOUNTER — TELEPHONE (OUTPATIENT)
Dept: FAMILY MEDICINE CLINIC | Facility: CLINIC | Age: 70
End: 2020-11-12

## 2020-11-12 NOTE — TELEPHONE ENCOUNTER
Lorraine Hendricks is requesting a palliative care for patient  Lorraine Hendricks said he spoke with daughter [facility] regarding Palliative and she is on board for a consult

## 2020-11-12 NOTE — TELEPHONE ENCOUNTER
Mustapha with [facility] is requesting an order for pt to see if pt can get other services. Please call mustapha back at [phone].

## 2020-11-13 NOTE — TELEPHONE ENCOUNTER
Spoke with Donato Holloway at [facility] and informed him of Dr Urmila Williamson message. He states they need the order for palliative care faxed to [phone]. Order /authorization faxed as requested and confirmation received.

## 2020-11-13 NOTE — TELEPHONE ENCOUNTER
An order for palliative care was placed last week. I also checked that physical therapy and OT would be of benefit. Please verify what what Lorraine Hendricks from [address] [place] [address] is requesting.   I.e. other services  Please pend order and I will sign

## 2020-11-13 NOTE — TELEPHONE ENCOUNTER
Fax received back from [facility]  It states the Vendor specified on the order is [facility], [address] the order for palliative care in [address] from 11/9/2020 with [facility] as the vendor.    Order is pending  Can you please revi

## 2020-11-16 NOTE — TELEPHONE ENCOUNTER
[facility]. It can no longer be a verbal order. Is it possible to put the order in Epic under code [id number]?   If you have questions, please call Mustapha at [phone]

## 2020-11-16 NOTE — TELEPHONE ENCOUNTER
Spoke to London Moss at [facility]. Informed him that order for palliative care is in [facility] and is authorized and certification number. London Moss states that should be all that is needed.    Copy of updated referral faxed to [facility] at [phone]  Confirmation rece

## 2020-11-25 DIAGNOSIS — G57.93 NEUROPATHY INVOLVING BOTH LOWER EXTREMITIES: ICD-10-CM

## 2020-11-25 DIAGNOSIS — G81.94 LEFT HEMIPARESIS (HCC): ICD-10-CM

## 2020-11-25 DIAGNOSIS — E11.42 TYPE 2 DIABETES MELLITUS WITH DIABETIC POLYNEUROPATHY, WITH LONG-TERM CURRENT USE OF INSULIN (HCC): ICD-10-CM

## 2020-11-25 DIAGNOSIS — K21.9 GASTROESOPHAGEAL REFLUX DISEASE: ICD-10-CM

## 2020-11-25 DIAGNOSIS — Z79.4 TYPE 2 DIABETES MELLITUS WITH DIABETIC POLYNEUROPATHY, WITH LONG-TERM CURRENT USE OF INSULIN (HCC): ICD-10-CM

## 2020-11-25 RX ORDER — GABAPENTIN 300 MG/1
CAPSULE ORAL
Qty: 270 CAPSULE | Refills: 0 | Status: SHIPPED | OUTPATIENT
Start: 2020-11-25

## 2020-11-25 NOTE — TELEPHONE ENCOUNTER
Patients daughter called asking for a refill on his pain medication samotizine and gabapentin. Please call her with any questions.

## 2020-12-01 ENCOUNTER — TELEPHONE (OUTPATIENT)
Dept: FAMILY MEDICINE CLINIC | Facility: CLINIC | Age: 70
End: 2020-12-01

## 2020-12-01 NOTE — TELEPHONE ENCOUNTER
Home health care nurse calling for pt. Pt needs palitive care order. Radha Lozyoa states order needs to say \"service to home base palitive\" code in epic is \"[id number]\". Radha Friedmanost - [phone].

## 2020-12-01 NOTE — TELEPHONE ENCOUNTER
Referral department requesting CPT codes  LM with Mustapha RN [facility] for input on home based palliative care CPT codes

## 2020-12-01 NOTE — TELEPHONE ENCOUNTER
Spoke with Nkechi Flannery. He states pt is being discharged from home health today    He states the order for palliative care has to say \"service to home based palliative care. He does not have a CPT code.  He was told that [id number] is a code used in Epi

## 2020-12-02 ENCOUNTER — MED REC SCAN ONLY (OUTPATIENT)
Dept: FAMILY MEDICINE CLINIC | Facility: CLINIC | Age: 70
End: 2020-12-02

## 2020-12-03 ENCOUNTER — TELEPHONE (OUTPATIENT)
Dept: FAMILY MEDICINE CLINIC | Facility: CLINIC | Age: 70
End: 2020-12-03

## 2020-12-03 NOTE — TELEPHONE ENCOUNTER
Report received   Gave to provider for review  Reports still do not indicate repeat colonoscopy due date

## 2020-12-03 NOTE — TELEPHONE ENCOUNTER
Received discharge orders from [facility]. Completed and signed by PCP. Faxed with confirmation. Sent to scan.

## 2020-12-04 NOTE — TELEPHONE ENCOUNTER
LMOM to return call to the office. Provided pt office phone [phone] along with office hours.       Lm for assistance with orders below

## 2020-12-08 ENCOUNTER — TRANSCRIBE ORDERS (OUTPATIENT)
Dept: PALLIATIVE CARE | Age: 70
End: 2020-12-08

## 2020-12-08 ENCOUNTER — TELEPHONE (OUTPATIENT)
Dept: FAMILY MEDICINE CLINIC | Facility: CLINIC | Age: 70
End: 2020-12-08

## 2020-12-08 DIAGNOSIS — E78.2 MIXED HYPERLIPIDEMIA: ICD-10-CM

## 2020-12-08 DIAGNOSIS — I72.3 ILIAC ARTERY ANEURYSM, LEFT (CMD): ICD-10-CM

## 2020-12-08 DIAGNOSIS — I77.9 PERIPHERAL ARTERIAL OCCLUSIVE DISEASE (HCC): ICD-10-CM

## 2020-12-08 DIAGNOSIS — I71.4 ABDOMINAL AORTIC ANEURYSM (AAA) WITHOUT RUPTURE (HCC): ICD-10-CM

## 2020-12-08 DIAGNOSIS — I10 ESSENTIAL HYPERTENSION: Primary | ICD-10-CM

## 2020-12-08 DIAGNOSIS — I73.9 PVD (PERIPHERAL VASCULAR DISEASE) (CMD): ICD-10-CM

## 2020-12-08 DIAGNOSIS — I73.9 PERIPHERAL VASCULAR DISEASE (HCC): ICD-10-CM

## 2020-12-08 NOTE — TELEPHONE ENCOUNTER
LM for Janel Rinne with [facility] to call back to take with any nurse to update referral per [facility] RN request

## 2020-12-08 NOTE — TELEPHONE ENCOUNTER
Call received from [facility] at [address] [place] [address] states that even though pt received home health through [facility], the palliative care dept is its own entity with different insurance   They are not contracted with Jada Patterson

## 2020-12-08 NOTE — TELEPHONE ENCOUNTER
Arnell Cockayne with [address] called office stating she is returning nurses call.  Arnell Cockayne can be contacted at [phone]

## 2020-12-08 NOTE — TELEPHONE ENCOUNTER
Spoke with Brayan Weeks with palliative care. Clarified with her that we are part of [facility], not [facility]. Therefore, we do not have access to their electronic referral template and we cannot enter the order as requested electronically.   They cannot access

## 2020-12-08 NOTE — TELEPHONE ENCOUNTER
Call received from Conor Harmon at [address] [place] [address] states that even though pt received home health through [facility], the palliative care dept is its own entity with different insurance   They are not contracted with Kimi Oglesby

## 2020-12-08 NOTE — TELEPHONE ENCOUNTER
Spoke with Shelby Bravo from [address]  Apparently the order from 11/9/2020 needs to be changed because it is still listing [address] as the recipient under Transfer of Care Information   Unable to edit order to change this.    New

## 2020-12-08 NOTE — TELEPHONE ENCOUNTER
Incoming call from Emeka at [facility] states that they are unable to accept the patient for services.

## 2020-12-08 NOTE — TELEPHONE ENCOUNTER
LM with [facility]. Why are they unable to accept pt?   New order is pending if that is what they need

## 2020-12-09 DIAGNOSIS — Z79.4 TYPE 2 DIABETES MELLITUS WITH DIABETIC POLYNEUROPATHY, WITH LONG-TERM CURRENT USE OF INSULIN (HCC): ICD-10-CM

## 2020-12-09 DIAGNOSIS — E11.42 TYPE 2 DIABETES MELLITUS WITH DIABETIC POLYNEUROPATHY, WITH LONG-TERM CURRENT USE OF INSULIN (HCC): ICD-10-CM

## 2020-12-09 RX ORDER — ATORVASTATIN CALCIUM 20 MG/1
20 TABLET, FILM COATED ORAL NIGHTLY
Qty: 90 TABLET | Refills: 0 | Status: SHIPPED | OUTPATIENT
Start: 2020-12-09

## 2020-12-09 NOTE — TELEPHONE ENCOUNTER
Please call [employer] to clarify which palliative care companies are contracted with [employer] and pend order  Thank you

## 2020-12-09 NOTE — TELEPHONE ENCOUNTER
See TE 12/1/2020[facility] to see patient for palliative home care  LM on [facility] phone regarding [facility]  Taking care of father instead of [facility]. Number provided in previous message  LMOM to return call to the office.  Provided pt, daughter office phone [phone])

## 2020-12-09 NOTE — TELEPHONE ENCOUNTER
LMOM to return call to the office if needed. Provided pt office phone [phone] along with office hours.   Lm for [facility] that [facility] would be contacting her to set up apt to see Susanne Voss and evaluate for palliative care

## 2020-12-09 NOTE — TELEPHONE ENCOUNTER
Pt requesting refill of   Requested Prescriptions     Pending Prescriptions Disp Refills   • ATORVASTATIN 20 MG Oral Tab [Pharmacy Med Name: ATORVASTATIN CALCIUM 20 MG Tablet] 90 tablet 0     Sig: TAKE 1 TABLET (20 MG TOTAL) BY MOUTH NIGHTLY.      Passed pr

## 2020-12-09 NOTE — TELEPHONE ENCOUNTER
Spoke with Pk Montaño (Palliative Supervisor) with [facility]  They received authorization today from 11/9/2020 referral order    Wild Choudhary direct number [phone]    She reports she will call Han Figueroa today to set up an apt

## 2020-12-10 RX ORDER — TAMSULOSIN HYDROCHLORIDE 0.4 MG/1
0.4 CAPSULE ORAL DAILY
Qty: 90 CAPSULE | Refills: 3 | Status: SHIPPED | OUTPATIENT
Start: 2020-12-10

## 2020-12-10 NOTE — TELEPHONE ENCOUNTER
Received call from [facility] for [facility]. She will fax order for Palliative care for signature and completion. Need to include date of LOV 11/9/2020. Awaiting fax.

## 2020-12-15 NOTE — TELEPHONE ENCOUNTER
Form not received  Spoke to [facility] and [facility]  She will refax forms for completion

## 2020-12-21 NOTE — TELEPHONE ENCOUNTER
LMOM to return call to the office. Provided office phone [phone] along with office hours.     Lm to conform patient is set up with palliative care

## 2020-12-22 ENCOUNTER — TELEPHONE (OUTPATIENT)
Dept: FAMILY MEDICINE CLINIC | Facility: CLINIC | Age: 70
End: 2020-12-22

## 2020-12-22 NOTE — TELEPHONE ENCOUNTER
Josias Clemens from [facility] called to let  know that pallitive care is scheduled on 12/29/20. Josias Clemens states nurse left message for Yara Arroyo is calling back to follow up.     [phone]

## 2020-12-28 ENCOUNTER — TELEPHONE (OUTPATIENT)
Dept: FAMILY MEDICINE CLINIC | Facility: CLINIC | Age: 70
End: 2020-12-28

## 2020-12-28 NOTE — TELEPHONE ENCOUNTER
Pt's daughter Han Other states that she needs a letter for her employer stating that pt is bedridden and she is the primary caregiver at this time in order to take a days off work when needed. Letter generated. Please review and sign if ok.     Daughter would p

## 2020-12-28 NOTE — TELEPHONE ENCOUNTER
LM on daughter Maddie's voicemail with details of letter and that letter is available for  in office  Please call when she arrives to office and bring photo ID in when picking up

## 2021-01-01 NOTE — TELEPHONE ENCOUNTER
Mother called, update provided. Patient remains in nonwarming radiant warmer on 2L NC, 55-60%. Remains with chest tube to suction. Remains gavage feeds, did attempt to nipple with speech today. Voiding and stooling.    Pt requesting refill on Cetirizine, protocol passed, refill approved    LOV 11/2/18    LF previous provider    Future Appointments   Date Time Provider Edmond Warren   12/14/2018  9:40 AM Sundar Capps MD [facility]   12/14/2018 10:00 AM [place],

## 2021-01-07 ENCOUNTER — APPOINTMENT (OUTPATIENT)
Dept: GENERAL RADIOLOGY | Facility: HOSPITAL | Age: 71
End: 2021-01-07
Attending: EMERGENCY MEDICINE
Payer: MEDICARE

## 2021-01-07 ENCOUNTER — HOSPITAL ENCOUNTER (EMERGENCY)
Facility: HOSPITAL | Age: 71
Discharge: HOME | End: 2021-01-07
Attending: EMERGENCY MEDICINE
Payer: MEDICARE

## 2021-01-07 VITALS
DIASTOLIC BLOOD PRESSURE: 75 MMHG | SYSTOLIC BLOOD PRESSURE: 135 MMHG | RESPIRATION RATE: 17 BRPM | HEART RATE: 90 BPM | WEIGHT: 153 LBS | OXYGEN SATURATION: 94 % | TEMPERATURE: 99 F | HEIGHT: 63 IN | BODY MASS INDEX: 27.11 KG/M2

## 2021-01-07 DIAGNOSIS — J06.9 UPPER RESPIRATORY TRACT INFECTION, UNSPECIFIED TYPE: Primary | ICD-10-CM

## 2021-01-07 LAB
ALBUMIN SERPL-MCNC: 3.1 G/DL (ref 3.4–5)
ALBUMIN/GLOB SERPL: 0.9 {RATIO} (ref 1–2)
ALP LIVER SERPL-CCNC: 122 U/L
ALT SERPL-CCNC: 15 U/L
ANION GAP SERPL CALC-SCNC: 8 MMOL/L (ref 0–18)
AST SERPL-CCNC: 12 U/L (ref 15–37)
BASOPHILS # BLD AUTO: 0.02 X10(3) UL (ref 0–0.2)
BASOPHILS NFR BLD AUTO: 0.3 %
BILIRUB SERPL-MCNC: 0.3 MG/DL (ref 0.1–2)
BUN BLD-MCNC: 13 MG/DL (ref 7–18)
BUN/CREAT SERPL: 29.5 (ref 10–20)
CALCIUM BLD-MCNC: 9 MG/DL (ref 8.5–10.1)
CHLORIDE SERPL-SCNC: 102 MMOL/L (ref 98–112)
CO2 SERPL-SCNC: 25 MMOL/L (ref 21–32)
CREAT BLD-MCNC: 0.44 MG/DL
DEPRECATED RDW RBC AUTO: 56.8 FL (ref 35.1–46.3)
EOSINOPHIL # BLD AUTO: 0.26 X10(3) UL (ref 0–0.7)
EOSINOPHIL NFR BLD AUTO: 3.7 %
ERYTHROCYTE [DISTWIDTH] IN BLOOD BY AUTOMATED COUNT: 20.8 % (ref 11–15)
GLOBULIN PLAS-MCNC: 3.6 G/DL (ref 2.8–4.4)
GLUCOSE BLD-MCNC: 138 MG/DL (ref 70–99)
HCT VFR BLD AUTO: 32.5 %
HGB BLD-MCNC: 10.5 G/DL
IMM GRANULOCYTES # BLD AUTO: 0.04 X10(3) UL (ref 0–1)
IMM GRANULOCYTES NFR BLD: 0.6 %
LACTATE SERPL-SCNC: 1.3 MMOL/L (ref 0.4–2)
LYMPHOCYTES # BLD AUTO: 1.32 X10(3) UL (ref 1–4)
LYMPHOCYTES NFR BLD AUTO: 18.9 %
M PROTEIN MFR SERPL ELPH: 6.7 G/DL (ref 6.4–8.2)
MCH RBC QN AUTO: 24.6 PG (ref 26–34)
MCHC RBC AUTO-ENTMCNC: 32.3 G/DL (ref 31–37)
MCV RBC AUTO: 76.3 FL
MONOCYTES # BLD AUTO: 0.82 X10(3) UL (ref 0.1–1)
MONOCYTES NFR BLD AUTO: 11.7 %
NEUTROPHILS # BLD AUTO: 4.53 X10 (3) UL (ref 1.5–7.7)
NEUTROPHILS # BLD AUTO: 4.53 X10(3) UL (ref 1.5–7.7)
NEUTROPHILS NFR BLD AUTO: 64.8 %
OSMOLALITY SERPL CALC.SUM OF ELEC: 282 MOSM/KG (ref 275–295)
PLATELET # BLD AUTO: 171 10(3)UL (ref 150–450)
POTASSIUM SERPL-SCNC: 3.8 MMOL/L (ref 3.5–5.1)
RBC # BLD AUTO: 4.26 X10(6)UL
SARS-COV-2 RNA RESP QL NAA+PROBE: NOT DETECTED
SODIUM SERPL-SCNC: 135 MMOL/L (ref 136–145)
WBC # BLD AUTO: 7 X10(3) UL (ref 4–11)

## 2021-01-07 PROCEDURE — 87040 BLOOD CULTURE FOR BACTERIA: CPT | Performed by: EMERGENCY MEDICINE

## 2021-01-07 PROCEDURE — 36415 COLL VENOUS BLD VENIPUNCTURE: CPT | Performed by: EMERGENCY MEDICINE

## 2021-01-07 PROCEDURE — 99283 EMERGENCY DEPT VISIT LOW MDM: CPT | Performed by: EMERGENCY MEDICINE

## 2021-01-07 PROCEDURE — 83605 ASSAY OF LACTIC ACID: CPT | Performed by: EMERGENCY MEDICINE

## 2021-01-07 PROCEDURE — 85025 COMPLETE CBC W/AUTO DIFF WBC: CPT | Performed by: EMERGENCY MEDICINE

## 2021-01-07 PROCEDURE — 71045 X-RAY EXAM CHEST 1 VIEW: CPT | Performed by: EMERGENCY MEDICINE

## 2021-01-07 PROCEDURE — 80053 COMPREHEN METABOLIC PANEL: CPT | Performed by: EMERGENCY MEDICINE

## 2021-01-07 RX ORDER — CEPHALEXIN 500 MG/1
500 CAPSULE ORAL 2 TIMES DAILY
Qty: 14 CAPSULE | Refills: 0 | Status: SHIPPED | OUTPATIENT
Start: 2021-01-07 | End: 2021-01-14

## 2021-01-07 NOTE — ED NOTES
[facility] called on behalf of patient family request, see previous note from RN. States that ambulance will be on scene ETA 20-30 minutes. RN aware.

## 2021-01-07 NOTE — ED NOTES
Called pt's daughter, spoke to Oneil Velazquez. She was updated of pt's labs and that pt is afebrile in the ED. Daughter very appreciative of the care, requests to call [facility] to take pt home, \"I have an account with them. \" Dr Agus Diaz informed of this

## 2021-01-07 NOTE — ED PROVIDER NOTES
Patient Seen in: [facility] Emergency Department      History   Patient presents with:  Fever    Stated Complaint: fever    HPI/Subjective:   HPI    77-year-old male presents reporting fever at home.   Family reports he had a fever to 100.9 °F at [facility] Incontinence    • Nontraumatic intracerebral hemorrhage in hemisphere, subcortical [facility])    • Personal history of malignant neoplasm of prostate    • Platelet dysfunction due to drugs 3/2/2019   • Problems with swallowing    • Stroke [facility])    • TIA (transi evidence of infection. Stage II.       ED Course     Labs Reviewed   COMP METABOLIC PANEL (14) - Abnormal; Notable for the following components:       Result Value    Glucose 138 (*)     Sodium 135 (*)     Creatinine 0.44 (*)     BUN/CREA Ratio 29.5 (*) The patient was unable to provide a urine sample. We attempted to catheterize him multiple times to obtain a sample. A bladder scan showed greater than 150 mL of urine in the bladder.   Chart evaluation does show that he has had multiple UTIs in the past

## 2021-01-07 NOTE — ED NOTES
Urine straight cath attempted, unable to pass the catheter all the way, meeting resistance. Nidhi Diaz RN attempted to straight cath using a Coude cath Fr16, still unable to.  Dr Molina Abt informed of this

## 2021-01-26 ENCOUNTER — TELEPHONE (OUTPATIENT)
Dept: FAMILY MEDICINE CLINIC | Facility: CLINIC | Age: 71
End: 2021-01-26

## 2021-01-26 DIAGNOSIS — R05.8 DRY COUGH: ICD-10-CM

## 2021-01-26 DIAGNOSIS — K55.20 AVM (ARTERIOVENOUS MALFORMATION) OF COLON WITHOUT HEMORRHAGE: ICD-10-CM

## 2021-01-26 DIAGNOSIS — I10 ESSENTIAL HYPERTENSION, BENIGN: ICD-10-CM

## 2021-01-26 DIAGNOSIS — K21.9 GASTROESOPHAGEAL REFLUX DISEASE: ICD-10-CM

## 2021-01-26 DIAGNOSIS — Z79.4 TYPE 2 DIABETES MELLITUS WITH DIABETIC POLYNEUROPATHY, WITH LONG-TERM CURRENT USE OF INSULIN (HCC): ICD-10-CM

## 2021-01-26 DIAGNOSIS — E11.42 TYPE 2 DIABETES MELLITUS WITH DIABETIC POLYNEUROPATHY, WITH LONG-TERM CURRENT USE OF INSULIN (HCC): ICD-10-CM

## 2021-01-26 DIAGNOSIS — K59.01 SLOW TRANSIT CONSTIPATION: ICD-10-CM

## 2021-01-26 RX ORDER — METFORMIN HYDROCHLORIDE 750 MG/1
750 TABLET, EXTENDED RELEASE ORAL 2 TIMES DAILY WITH MEALS
Qty: 180 TABLET | Refills: 0 | Status: SHIPPED | OUTPATIENT
Start: 2021-01-26

## 2021-01-26 RX ORDER — AMLODIPINE BESYLATE 5 MG/1
5 TABLET ORAL DAILY
Qty: 90 TABLET | Refills: 0 | Status: SHIPPED | OUTPATIENT
Start: 2021-01-26

## 2021-01-26 NOTE — TELEPHONE ENCOUNTER
Please advise if you can enter referral for hospice or if appt required.      Please also see Physician Certification for [address] received from Gurvinder Saldana in PCP bin

## 2021-01-26 NOTE — TELEPHONE ENCOUNTER
Called and verified with pts daughter that she would like refills sent to [facility] this time since he is completely out of his meds.    Pt requesting refill of   Requested Prescriptions     Pending Prescriptions Disp Refills   • metFORMIN HCl  MG Ora

## 2021-01-26 NOTE — TELEPHONE ENCOUNTER
New encounter created for refills    Please see below regarding hospice orders    Please advise if you can enter referral for hospice or if appt required.      Please also see Physician Certification for Hospice Care received from Gurvinder Saldana i

## 2021-01-26 NOTE — TELEPHONE ENCOUNTER
Henrryjames Fisher daughter called to request refills for patient. Yeimi Carbajal states patient is completely out of medication, and has not taken his meds the last 3 days. Pts daughter states she thought Marie Terry was going to mail them so she didn't request refills.  Pt needs

## 2021-01-26 NOTE — TELEPHONE ENCOUNTER
Note     Maddie, Pts daughter called to request refills for patient. Shelby Groverer states patient is completely out of medication, and has not taken his meds the last 3 days. Pts daughter states she thought Fly Alonso was going to mail them so she didn't request refills.

## 2021-01-27 RX ORDER — SERTRALINE HYDROCHLORIDE 100 MG/1
200 TABLET, FILM COATED ORAL DAILY
COMMUNITY
Start: 2020-12-31

## 2021-01-27 RX ORDER — ASPIRIN 81 MG
100 TABLET, DELAYED RELEASE (ENTERIC COATED) ORAL NIGHTLY
Qty: 90 TABLET | Refills: 3 | Status: SHIPPED | OUTPATIENT
Start: 2021-01-27

## 2021-01-27 RX ORDER — OMEPRAZOLE 20 MG/1
CAPSULE, DELAYED RELEASE ORAL
Qty: 30 CAPSULE | Refills: 6 | Status: SHIPPED | OUTPATIENT
Start: 2021-01-27 | End: 2021-05-17

## 2021-01-27 RX ORDER — FERROUS SULFATE 325(65) MG
325 TABLET ORAL 2 TIMES DAILY
Qty: 180 TABLET | Refills: 2 | Status: SHIPPED | OUTPATIENT
Start: 2021-01-27

## 2021-01-28 PROBLEM — Z74.01 BEDRIDDEN: Status: ACTIVE | Noted: 2021-01-28

## 2021-01-28 NOTE — TELEPHONE ENCOUNTER
Dr. Cuauhtemoc Rene, what is the terminal diagnosis for hospice care? Will complete form and fax. Thanks.

## 2021-01-28 NOTE — TELEPHONE ENCOUNTER
Cerebrovascular accident and left sided paralysis which is resolved of his stroke. Bed ridden-added to problem list-since stroke.   May also put coronary artery disease, PVD    Patient is unable to care for himself, is bedridden and continues to decline

## 2021-01-28 NOTE — TELEPHONE ENCOUNTER
Received call from [facility], Hospice coordinator, Carol Easton. Requesting verbal from Dr. Davis Camargo until forms can be completed and faxed. Per Brandy Major, DO:  Okay to begin hospice. Patient should be followed by hospice physicians.      Provided verbal.

## 2021-01-29 NOTE — TELEPHONE ENCOUNTER
Spoke with Carol Chinchilla 54, at [facility]  Notified her of information below  Per Dr Padilla Late - transfer care to [facility] physician  And Dr Valerie Humphreys will just receive updates  She will not be providing orders

## 2021-02-01 DIAGNOSIS — Z23 NEED FOR VACCINATION: ICD-10-CM

## 2021-02-02 NOTE — TELEPHONE ENCOUNTER
Received initial certification statement from [facility] for Hospice care.  Orders signed by provider and faxed to [address] rec'd  Copy to scanning

## 2021-02-22 ENCOUNTER — MED REC SCAN ONLY (OUTPATIENT)
Dept: FAMILY MEDICINE CLINIC | Facility: CLINIC | Age: 71
End: 2021-02-22

## 2021-04-01 ENCOUNTER — TELEPHONE (OUTPATIENT)
Dept: FAMILY MEDICINE CLINIC | Facility: CLINIC | Age: 71
End: 2021-04-01

## 2021-04-01 NOTE — TELEPHONE ENCOUNTER
Received order request for incontinence supplies from [facility]  Reviewed and signed by PCP   Faxed to Megan [email or web address]

## 2021-04-07 DIAGNOSIS — R05.8 DRY COUGH: ICD-10-CM

## 2021-04-07 DIAGNOSIS — K21.9 GASTROESOPHAGEAL REFLUX DISEASE: ICD-10-CM

## 2021-04-08 RX ORDER — OMEPRAZOLE 20 MG/1
CAPSULE, DELAYED RELEASE ORAL
Qty: 90 CAPSULE | Refills: 0 | OUTPATIENT
Start: 2021-04-08

## 2021-04-14 DIAGNOSIS — I10 ESSENTIAL HYPERTENSION, BENIGN: ICD-10-CM

## 2021-04-14 DIAGNOSIS — Z79.4 TYPE 2 DIABETES MELLITUS WITH DIABETIC POLYNEUROPATHY, WITH LONG-TERM CURRENT USE OF INSULIN (HCC): ICD-10-CM

## 2021-04-14 DIAGNOSIS — E11.42 TYPE 2 DIABETES MELLITUS WITH DIABETIC POLYNEUROPATHY, WITH LONG-TERM CURRENT USE OF INSULIN (HCC): ICD-10-CM

## 2021-04-14 RX ORDER — METFORMIN HYDROCHLORIDE 750 MG/1
TABLET, EXTENDED RELEASE ORAL
Qty: 180 TABLET | Refills: 0 | OUTPATIENT
Start: 2021-04-14

## 2021-04-14 RX ORDER — AMLODIPINE BESYLATE 5 MG/1
TABLET ORAL
Qty: 90 TABLET | Refills: 0 | OUTPATIENT
Start: 2021-04-14

## 2021-05-01 ENCOUNTER — APPOINTMENT (OUTPATIENT)
Dept: GENERAL RADIOLOGY | Facility: HOSPITAL | Age: 71
End: 2021-05-01
Attending: EMERGENCY MEDICINE
Payer: OTHER MISCELLANEOUS

## 2021-05-01 ENCOUNTER — HOSPITAL ENCOUNTER (EMERGENCY)
Facility: HOSPITAL | Age: 71
Discharge: HOME | End: 2021-05-01
Attending: EMERGENCY MEDICINE
Payer: OTHER MISCELLANEOUS

## 2021-05-01 VITALS
DIASTOLIC BLOOD PRESSURE: 73 MMHG | OXYGEN SATURATION: 98 % | RESPIRATION RATE: 17 BRPM | SYSTOLIC BLOOD PRESSURE: 141 MMHG | HEIGHT: 63 IN | HEART RATE: 89 BPM | WEIGHT: 153 LBS | TEMPERATURE: 99 F | BODY MASS INDEX: 27.11 KG/M2

## 2021-05-01 DIAGNOSIS — K59.00 CONSTIPATION, UNSPECIFIED CONSTIPATION TYPE: Primary | ICD-10-CM

## 2021-05-01 PROCEDURE — 99283 EMERGENCY DEPT VISIT LOW MDM: CPT

## 2021-05-01 PROCEDURE — 74019 RADEX ABDOMEN 2 VIEWS: CPT | Performed by: EMERGENCY MEDICINE

## 2021-05-01 NOTE — ED PROVIDER NOTES
Patient Seen in: [facility] Emergency Department      History   Patient presents with:  Constipation    Stated Complaint:     HPI/Subjective:   HPI    This is a 58-year-old male with past medical surgical a pancreatitis respiratory failure on Plavix artery aneurysm, left ([facility] [address] 8/25/2016    [place] repair [facility] Jane Wills/Raj 2/22/2017   • Incontinence    • Nontraumatic intracerebral hemorrhage in hemisphere, subcortical [facility])    • Personal history of malignant neoplasm of prostate    • Platelet dy Resp 18   Temp 98.7 °F (37.1 °C)   Temp src Temporal   SpO2 99 %   O2 Device None (Room air)       Current:/73   Pulse 89   Temp 98.7 °F (37.1 °C) (Temporal)   Resp 17   Ht 160 cm (5' 3\")   Wt 69.4 kg   SpO2 98%   BMI 27.10 kg/m²         Physical the rectum. Patient tolerated procedure well. Discussed discharge planning through . Patient apparently is on hospice and will be started discharged back to his hospice facility. He'll need further treatment of constipation.  Included in disch

## 2021-05-16 DIAGNOSIS — K21.9 GASTROESOPHAGEAL REFLUX DISEASE: ICD-10-CM

## 2021-05-16 DIAGNOSIS — R05.8 DRY COUGH: ICD-10-CM

## 2021-05-17 RX ORDER — OMEPRAZOLE 20 MG/1
CAPSULE, DELAYED RELEASE ORAL
Qty: 30 CAPSULE | Refills: 6 | Status: SHIPPED | OUTPATIENT
Start: 2021-05-17

## 2021-05-17 NOTE — TELEPHONE ENCOUNTER
Pt requesting refill of   Requested Prescriptions     Pending Prescriptions Disp Refills   • omeprazole 20 MG Oral Capsule Delayed Release [Pharmacy Med Name: OMEPRAZOLE 20MG CAPSULES] 30 capsule 6     Sig: TAKE 1 CAPSULE BY MOUTH EVERY MORNING 45 MINUTES

## 2021-06-25 NOTE — TELEPHONE ENCOUNTER
Final visual acuity after complete healing is hard to predict at this point. LMOM to return call to the office. Provided pt office phone [phone] along with office hours given.

## 2021-08-27 NOTE — TELEPHONE ENCOUNTER
Spoke to pt's daughter Army Márquez   She states pt needs refills on famotidine and gabapentin  Informed [facility] that famotidine was d/c'd 9/17/20 and changed to omeprazole.   Refills of omeprazole available at pharmacy    Refill for gabapentin pending  Last filled 10 Birth Control Pills Counseling: Birth Control Pill Counseling: I discussed with the patient the potential side effects of OCPs including but not limited to increased risk of stroke, heart attack, thrombophlebitis, deep venous thrombosis, hepatic adenomas, breast changes, GI upset, headaches, and depression.  The patient verbalized understanding of the proper use and possible adverse effects of OCPs. All of the patient's questions and concerns were addressed.

## 2021-09-25 NOTE — PROGRESS NOTES
I spoke with patients daughter  regarding NO SHOW status for office visit on 10/9/18 with Dr. Braeden Paz. I informed patient our office has a $14.26 NO SHOW FEE POLICY. Patient will be charged $40.00 for any future NO SHOW appointments.  Patient [facility]    Discharge Summary  Hospitalist    Date of Admission:  9/22/2021  Date of Discharge:  9/25/2021  Discharging Provider: Florian Krishnan MD    Discharge Diagnoses      Suspect recrudescence of prior stroke symptoms in the setting of a metabolic encephalopathy versus seizure  Previous history of CVA  Acute metabolic encephalopathy  Seizure disorder  Suspected dehydration   UTI with E. Coli  H/o afib s/p PPM for bradycardia with pauses on chronic anticoagulation  Essential hypertension  Hyperlipidemia  Cognitive impairment.   GERD  Reactive airway disease   Chronic mild anemia     Hospital Course:  Radha Cunningham is a 89 year old female with cognitive impairment, Afib on anticoagulation, history of strokes who presented on 9/22/21 with stroke-like symptoms.     Suspect recrudescence of prior stroke symptoms in the setting of a metabolic encephalopathy versus seizure  -Patient was brought to the emergency room after she was found to have slurred speech and right-sided facial droop by staff at her memory care unit.  Reportedly at baseline she speaks clearly and is able to carry out simple conversation.  * Code stroke was called in the ER, evaluated by stroke neurology, not felt to be a candidate for TPA because of patient being on Eliquis.  * Head CT without contrast shows no acute findings. Old strokes in cerebral artery territory and R basal ganglia region.   * Unable to do CT angiogram as reportedly she has had anaphylaxis to contrast dye although it is noted that she had CTA head and neck done in 2020 with premedication(per daughter)  *Repeat CT head did not show any new stroke.  * EEG showed generalized slowing consistent with a moderate encephalopathy and maximum cortical dysfunction in the left hemisphere, specifically left posterior quadrant.  No electrographic seizures or epileptiform discharges.   - Stroke Neurology consulted and have signed off.   They felt that her  presentation was most likely due to recrudescence of prior stroke symptoms in the setting of acute metabolic encephalopathy.  There was also possibility of seizure given that patient had refused to take a dose of Vimpat prior to coming here.  -With treatment of UTI and resumption of her Vimpat mental status improved, more responsive, and probably at her baseline now  -Continue PTA Vimpat 50 mg BID  -Treat UTI as below  -Tolerating dysphagia diet.     Suspect dehydration   UTI - UA: 18 WBC, 125 RBC. Negative Nitrate. SG 1.022.   *  UCx growing E.coli, received 4 days of ceftriaxone in the hospital, will discharge with 3 more days of oral Keflex     H/o afib s/p PPM for bradycardia with pauses on chronic anticoagulation  Essential hypertension  Hyperlipidemia  -Continue PTA apixaban  -On no rate controlling medications PTA     Cognitive impairment. Patient lives in the memory care unit, per daughter at the bedside she does not have a formal diagnosis of dementia. She does have some ongoing memory problems especially since the stroke a year ago but at baseline she is able to carry out reasonable conversation.   -Patient now improved and probably back to her baseline.  She will discharge back to her long-term care facility.      Florian Krishnan MD    Significant Results and Procedures   See below    Pending Results     Unresulted Labs Ordered in the Past 30 Days of this Admission     No orders found from 8/23/2021 to 9/23/2021.          Code Status   DNR / DNI       Primary Care Physician   Selene Hercules, APRN, CNP    Physical Exam   Temp: 98  F (36.7  C) Temp src: Axillary BP: 133/89 Pulse: 74   Resp: 16 SpO2: 95 % O2 Device: None (Room air)      Constitutional: AAOX1, NAD, carries out very simple conversation  Respiratory: CTA B/L, Normal WOB  Cardiovascular: RRR, No murmur  GI: Soft, Non- tender, BS- normoactive  Neuro: CN- grossly intact.     Discharge Disposition   Discharged to long-term care  facility  Condition at discharge: Stable    Consultations This Hospital Stay   SPEECH LANGUAGE PATH ADULT IP CONSULT  SMOKING CESSATION PROGRAM IP CONSULT  NEUROLOGY IP CONSULT  PATIENT LEARNING CENTER IP CONSULT  SPEECH LANGUAGE PATH ADULT IP CONSULT  PHARMACY IP CONSULT  PHARMACY IP CONSULT  PHARMACY IP CONSULT  PHYSICAL THERAPY ADULT IP CONSULT  OCCUPATIONAL THERAPY ADULT IP CONSULT  REHAB ADMISSIONS LIAISON IP CONSULT  CARE MANAGEMENT / SOCIAL WORK IP CONSULT  SPIRITUAL HEALTH SERVICES IP CONSULT    Time Spent on this Encounter   Florian BARRIOS MD, personally saw the patient today and spent greater than 30 minutes discharging this patient.    Discharge Orders      General info for SNF    Length of Stay Estimate: Long term  Condition at Discharge: Improving  Level of care:skilled   Rehabilitation Potential:   Admission H&P remains valid and up-to-date: Yes  Recent Chemotherapy: N/A  Use Nursing Home Standing Orders: Yes     Mantoux instructions    Give two-step Mantoux (PPD) Per Facility Policy Yes     Activity - Up with nursing assistance     Intake and output    Every shift     Additional Discharge Instructions    Monitor closely for dehydration     Follow Up and recommended labs and tests    Follow up with Nursing home physician.     No CPR- Do NOT Intubate     Fall precautions     Advance Diet as Tolerated    Follow this diet upon discharge: Orders Placed This Encounter      Combination Diet Pureed Diet (level 4); Thin Liquids (level 0) (by cup; no spoon or straw)       Discharge Medications   Current Discharge Medication List      START taking these medications    Details   cephALEXin (KEFLEX) 500 MG capsule Take 1 capsule (500 mg) by mouth 3 times daily for 3 days  Qty: 9 capsule, Refills: 0    Associated Diagnoses: Urinary tract infection without hematuria, site unspecified         CONTINUE these medications which have NOT CHANGED    Details   acetaminophen (TYLENOL) 325 MG tablet Take 650 mg by  mouth every 4 hours as needed for mild pain      apixaban ANTICOAGULANT (ELIQUIS) 5 MG tablet Take 5 mg by mouth 2 times daily      atorvastatin (LIPITOR) 10 MG tablet Take 10 mg by mouth At Bedtime       Pamella Protect (EUCERIN) external cream Apply topically 2 times daily as needed for dry skin or other      bisacodyl (DULCOLAX) 10 MG suppository Place 10 mg rectally daily as needed for constipation      Calcium Carb-Cholecalciferol (CALTRATE 600+D3) 600-800 MG-UNIT TABS Take 1 tablet by mouth daily      Cranberry 1000 MG CAPS Take 2 capsules by mouth daily       emollient (VANICREAM) external cream Apply topically 2 times daily as needed To whole body       famotidine (PEPCID) 10 MG tablet Take 1 tablet (10 mg) by mouth 2 times daily  Qty:      Associated Diagnoses: 2019 novel coronavirus disease (COVID-19)      guaiFENesin (ROBITUSSIN) 100 MG/5ML SYRP Take 10 mLs by mouth every 4 hours as needed for cough      ipratropium-albuterol (COMBIVENT RESPIMAT)  MCG/ACT inhaler Inhale 1-2 puffs into the lungs 4 times daily as needed       lacosamide (VIMPAT) 50 MG TABS tablet Take 50 mg by mouth 2 times daily      loperamide (IMODIUM A-D) 2 MG tablet Take 4 mg after first loose stool.  Then take 2 mg after each loose stool as needed.  Max 16 mg/day      mirtazapine (REMERON) 15 MG tablet Take 15 mg by mouth At Bedtime      multivitamin w/minerals (THERA-VIT-M) tablet Take 1 tablet by mouth daily      nystatin (MYCOSTATIN) 398943 UNIT/GM external powder Apply topically 2 times daily Under bilateral breasts      potassium chloride ER (KLOR-CON M) 20 MEQ CR tablet Take 20 mEq by mouth daily      HYDROcodone-acetaminophen (NORCO) 5-325 MG tablet Take 1 tablet by mouth every 8 hours as needed for severe pain           Allergies   Allergies   Allergen Reactions     Contrast Dye      Diagnostic X-Ray Materials Unknown     Itching and eyes swell shut.   She reports reactions to xray contrast material       Data   Most Recent  3 CBC's:  Recent Labs   Lab Test 09/25/21  0650 09/23/21  0928 09/22/21  1801 06/01/21  0418   WBC  --  4.1 5.3 4.2   HGB 10.5* 9.4* 11.2* 10.8*   MCV  --  96 98 95   PLT  --  153 182 115*      Most Recent 3 BMP's:  Recent Labs   Lab Test 09/25/21  0818 09/25/21  0650 09/24/21  2238 09/23/21  1246 09/23/21  0928 09/23/21  0150 09/22/21  1801   NA  --  141  --   --  139  --  136   POTASSIUM  --  3.7  --   --  3.6  --  4.0   CHLORIDE  --  110*  --   --  106  --  104   CO2  --  23  --   --  26  --  27   BUN  --  7  --   --  11  --  12   CR  --  0.63  --   --  0.70  --  0.74   ANIONGAP  --  8  --   --  7  --  5   SHAN  --  8.3*  --   --  8.3*  --  9.4   GLC 81 98 109*   < > 79   < > 110*    < > = values in this interval not displayed.     Most Recent 2 LFT's:  Recent Labs   Lab Test 05/30/21  0649 12/17/19  0530   AST 29 73*   ALT 21 84*   ALKPHOS 49 100   BILITOTAL 0.5 0.3     Most Recent INR's and Anticoagulation Dosing History:  Anticoagulation Dose History     Recent Dosing and Labs Latest Ref Rng & Units 12/3/2020 5/28/2021 5/29/2021 5/30/2021 5/31/2021 6/1/2021 9/22/2021    Warfarin 2 mg - - - - 4 mg - - -    Warfarin 4 mg - - - - - 4 mg - -    INR 0.85 - 1.15 2.39(H) 1.58(H) 1.76(H) 1.80(H) 1.49(H) 1.56(H) 1.26(H)        Most Recent 3 Troponin's:  Recent Labs   Lab Test 12/03/20  1533 09/06/20  1344 12/15/19  0524 11/18/19  0953 05/06/16  2105   TROPI <0.015 <0.015 0.045   < >  --    TROPONIN  --   --   --   --  0.01    < > = values in this interval not displayed.     Most Recent Cholesterol Panel:  Recent Labs   Lab Test 09/23/21  0928   CHOL 127   LDL 70   HDL 43*   TRIG 68     Most Recent 6 Bacteria Isolates From Any Culture (See EPIC Reports for Culture Details):  Recent Labs   Lab Test 12/04/20  2116 09/06/20  1516 12/15/19  0624 11/18/19  1111 11/18/19  1025 11/18/19  0953   CULT <1000 colonies/mL  mixed urogenital mohit  Susceptibility testing not routinely done   >100,000 colonies/mL  Escherichia coli  *  No growth  No growth No growth No growth No growth     Most Recent TSH, T4 and A1c Labs:  Recent Labs   Lab Test 09/23/21 0928 12/04/20 2032   TSH  --  3.45   A1C 6.0*  --        Results for orders placed or performed during the hospital encounter of 09/22/21   CT Head w/o Contrast    Narrative    EXAM: CT HEAD W/O CONTRAST  LOCATION: [facility]  DATE/TIME: 9/22/2021 6:14 PM    INDICATION: Code stroke.  COMPARISON: 12/04/2020.  TECHNIQUE: Routine CT Head without IV contrast. Multiplanar reformats. Dose reduction techniques were used.    FINDINGS:  INTRACRANIAL CONTENTS: Chronic moderate to large left posterior cerebral artery territory ischemic infarct involving the paramedian left occipital lobe and medial left temporal lobe. Unchanged chronic small infarct in the right basal ganglia region. No   definite CT signs of acute infarct. Mild to moderate generalized brain parenchymal volume loss. Moderately extensive patchy and confluent hypoattenuation in the cerebral white matter is nonspecific, but likely due to chronic small vessel ischemic   disease. No acute intracranial hemorrhage, extra-axial fluid collection or mass effect. Prominent intracranial atherosclerotic calcifications involving the carotid siphons and left vertebral artery.    VISUALIZED ORBITS/SINUSES/MASTOIDS: Prior bilateral cataract surgery. Visualized portions of the orbits are otherwise unremarkable. No paranasal sinus mucosal disease. No middle ear or mastoid effusion.    BONES/SOFT TISSUES: No acute abnormality. Right temporomandibular joint degenerative changes.      Impression    IMPRESSION:  1.  No CT findings of acute intracranial process.  2.  Chronic left posterior cerebral artery territory and right basal ganglia region infarcts, unchanged.  3.  Brain atrophy and presumed chronic small vessel ischemic change, as described.    The findings were discussed with the ER physician by myself at approximately 6:24 PM  on 09/22/2021.     CT Head w/o Contrast    Narrative    CT SCAN OF THE HEAD WITHOUT CONTRAST   9/23/2021 12:20 PM     HISTORY: Stroke, follow up. Right-sided weakness.    TECHNIQUE:  Axial images of the head and coronal reformations without  IV contrast material. Radiation dose for this scan was reduced using  automated exposure control, adjustment of the mA and/or kV according  to patient size, or iterative reconstruction technique.    COMPARISON: CT head 9/22/2021.    FINDINGS: Exam is significantly motion limited. The ventricles appear  stable in size and configuration. Mild expected dilatation of the  atrium of the left lateral ventricle, as before. Chronic right basal  ganglia region lacunar infarct, unchanged. Unchanged chronic left  posterior cerebral artery territory ischemic infarct involving the  paramedian occipital lobe and medial left temporal lobe. Moderate to  severely extensive hypoattenuation in the cerebral white matter which  is nonspecific, but likely reflects chronic small vessel ischemic  disease. Prominent diffuse atherosclerotic calcifications involving  the left vertebral artery and bilateral carotid siphons.    There is no definite acute intracranial hemorrhage. No definite  extended signs of acute infarct are identified on CT. There is no mass  effect or shift/herniation.    Bilateral lens implants. The paranasal sinuses and mastoids appear  grossly clear.      Impression    IMPRESSION:  1. Motion limited study.  2. No definite acute intracranial process identified on CT.  3. Unchanged chronic right basal ganglia and left posterior cerebral  artery territory infarcts.  4. Brain atrophy and presumed chronic small vessel ischemic changes,  as described.  5. Prominent intracranial atherosclerotic calcifications.    VANDANA GARCIA MD         SYSTEM ID:  [id number]   XR Pelvis 1/2 Views    Narrative    PELVIS ONE TO TWO VIEWS 9/23/2021 12:21 PM     HISTORY:  Left hip pain.    COMPARISON:  11/18/2019.    FINDINGS: Lumbar spine degenerative change.      Impression    IMPRESSION: Mild bilateral hip osteoarthritis. No fracture identified.      MAYTE GRIFFITHS MD         SYSTEM ID:  [id number]

## 2021-11-07 DIAGNOSIS — K21.9 GASTROESOPHAGEAL REFLUX DISEASE: ICD-10-CM

## 2021-11-07 DIAGNOSIS — R05.8 DRY COUGH: ICD-10-CM

## 2021-11-08 RX ORDER — OMEPRAZOLE 20 MG/1
CAPSULE, DELAYED RELEASE ORAL
Qty: 90 CAPSULE | Refills: 0 | OUTPATIENT
Start: 2021-11-08

## 2021-11-08 NOTE — TELEPHONE ENCOUNTER
Refill no protocol available:     Pt requesting refill of   Requested Prescriptions     Pending Prescriptions Disp Refills   • omeprazole 20 MG Oral Capsule Delayed Release [Pharmacy Med Name: OMEPRAZOLE 20MG CAPSULES] 90 capsule 0     Sig: TAKE 1 CAPSULE

## 2021-12-09 NOTE — ED INITIAL ASSESSMENT (HPI)
12/9/2021       RE: Frandy Workman  [address] [facility] [address]     Dear Colleague,    Thank you for referring your patient, Frandy Workman, to the [facility] ENDOCRINOLOGY [facility] [place] [facility]. Please see a copy of my visit note below.    Past Medical History:   Diagnosis Date     Anemia 2013     Arthritis      BPH (benign prostatic hyperplasia)      CAD (coronary artery disease) 4/1/2019     Cholelithiasis      Conductive hearing loss 08/16/2017     Depressive disorder 1986    Suffer effects throughout life     Gastroesophageal reflux disease 12/01/2014     HCC (hepatocellular carcinoma) (H) 1/22/2019     History of diabetic retinopathy 07/2018     HTN (hypertension)      HTN (hypertension) 11/20/2019     Hyperlipidemia      Liver cirrhosis secondary to ESTRADA (H)      Liver transplanted (H) 11/11/2019     Portal vein thrombosis 4/11/2019     Type II diabetes mellitus (H)      Patient Active Problem List   Diagnosis     Bipolar affective disorder in remission (H)     Esophageal varices determined by endoscopy (H)     Brow ptosis     Paralytic lagophthalmos of right upper eyelid     Erectile dysfunction due to diseases classified elsewhere     HCC (hepatocellular carcinoma) (H)     Equivocal stress echocardiogram     Type 2 diabetes mellitus with mild nonproliferative retinopathy of both eyes without macular edema, unspecified whether long term insulin use (H)     Status post coronary angiogram     CAD (coronary artery disease)     Liver transplant recipient (H)     Status post liver transplantation (H)     Immunosuppressed status (H)     Benign essential hypertension     Malnutrition related to chronic disease (H)     Hypophosphatasia     Chronic kidney disease, stage 3a (H)     Malnutrition (H)     Anxiety     Mild recurrent major depression (H)     Anemia of chronic renal failure, stage 3a (H)     Rekha (H)     History of coronary  Patient presents with a fever that began this evening, T-max 100.9. No meds taken at home. Patient also has a cough since yesterday evening. Denies congestion, N/V/D. artery disease     Dyslipidemia     Constipation, unspecified constipation type     Excessive sweating     Stage 3b chronic kidney disease (H)     Anemia of chronic renal failure, stage 3b (H)     NSTEMI (non-ST elevated myocardial infarction) (H)     Past Surgical History:   Procedure Laterality Date     BYPASS GRAFT ARTERY CORONARY N/A 7/14/2021    Procedure: median sternotomy, on cardiopulmonary bypass, CORONARY ARTERY BYPASS GRAFT (CABG) x2 with left greater saphenous vein endoscopic harvest and left internal mammery artery harvest;  Surgeon: Tom Zapata MD;  [facility]     COLONOSCOPY      2015     COLONOSCOPY N/A 12/6/2019    Procedure: COLONOSCOPY, WITH POLYPECTOMY AND BIOPSY;  Surgeon: Adam Morton MD;  [facility]     CV CENTRAL VENOUS CATHETER PLACEMENT N/A 7/12/2021    Procedure: Central Venous Catheter Placement;  Surgeon: Fermin Polanco MD;  Location:  HEART CARDIAC CATH LAB     CV CORONARY ANGIOGRAM N/A 7/12/2021    Procedure: Coronary Angiogram;  Surgeon: Fermin Polanco MD;  Location:  HEART CARDIAC CATH LAB     CV HEART CATHETERIZATION WITH POSSIBLE INTERVENTION N/A 2/26/2019    Procedure: CORS;  Surgeon: Jagdish Hoyt MD;  Location:  HEART CARDIAC CATH LAB     CV INTRA AORTIC BALLOON N/A 7/12/2021    Procedure: Intra Aortic Balloon Pump Insertion;  Surgeon: Fermin Polanco MD;  Location:  HEART CARDIAC CATH LAB     ESOPHAGOSCOPY, GASTROSCOPY, DUODENOSCOPY (EGD), COMBINED N/A 11/17/2016    Procedure: COMBINED ESOPHAGOSCOPY, GASTROSCOPY, DUODENOSCOPY (EGD);  Surgeon: Santi Rosas MD;  Location:  GI     ESOPHAGOSCOPY, GASTROSCOPY, DUODENOSCOPY (EGD), COMBINED N/A 11/17/2017    Procedure: COMBINED ESOPHAGOSCOPY, GASTROSCOPY, DUODENOSCOPY (EGD);  EGD;  Surgeon: Santi Rosas MD;  Location:  GI     ESOPHAGOSCOPY, GASTROSCOPY, DUODENOSCOPY (EGD), COMBINED N/A 12/28/2018    Procedure: EGD;   Surgeon: Santi Rosas MD;  [facility]     ESOPHAGOSCOPY, GASTROSCOPY, DUODENOSCOPY (EGD), COMBINED N/A 2019    Procedure: ESOPHAGOGASTRODUODENOSCOPY, WITH BIOPSY;  Surgeon: Adam Morton MD;  Location:  GI     ESOPHAGOSCOPY, GASTROSCOPY, DUODENOSCOPY (EGD), COMBINED N/A 2020    Procedure: ESOPHAGOGASTRODUODENOSCOPY (EGD);  Surgeon: Santi Rosas MD;  Location:  GI     HEAD & NECK SURGERY      2017 at [facility].      IMPLANT GOLD WEIGHT EYELID Right 2017    Procedure: IMPLANT WEIGHT EYELID;  Right Upper Eyelid Weight, right tarsal strip lower eyelid;  Surgeon: Milana Malave MD;  [facility]     IR CHEMO EMBOLIZATION  2019     KNEE SURGERY Left      ORTHOPEDIC SURGERY       PAROTIDECTOMY, RADICAL NECK DISSECTION Right 2017    Procedure: PAROTIDECTOMY, RADICAL NECK DISSECTION;  Right Superfacial Parotidectomy , Facial nerve repair. with [facility] facial nerve monitor.;  Surgeon: Asiya Morgan MD;  [facility]     PICC INSERTION Left 2017    4fr SL BioFlo PICC, 44cm in the L basilic vein w/ tip in the low SVC     RETURN LIVER TRANSPLANT N/A 2019    Procedure: Exploratory laparotomy, hematoma evacuation, abdominal washout;  Surgeon: Александр Ramos MD;  [facility]: [facility] OR     TRANSPLANT LIVER RECIPIENT  DONOR N/A 2019    Procedure: TRANSPLANT, LIVER, RECIPIENT,  DONOR;  Surgeon: Александр Ramos MD;  [facility]: [facility] OR     VASCULAR SURGERY       Social History     Socioeconomic History     Marital status:      Spouse name: Not on file     Number of children: Not on file     Years of education: Not on file     Highest education level: Bachelor's degree (e.g., BA, AB, BS)   Occupational History     Not on file   Tobacco Use     Smoking status: Former Smoker     Packs/day: 6.00     Years: 30.00     Pack years: 180.00     Types: Cigars     Start date: 2016     Quit date: 10/25/2017     Years since quittin.1      Smokeless tobacco: Former User     Types: Chew     Quit date: 10/31/2017     Tobacco comment: 1 tin per week   Substance and Sexual Activity     Alcohol use: No     Alcohol/week: 0.0 standard drinks     Comment: quit 1996     Drug use: No     Sexual activity: Not Currently     Partners: Female     Birth control/protection: Condom   Other Topics Concern     Parent/sibling w/ CABG, MI or angioplasty before 65F 55M? Yes   Social History Narrative    Prior [facility] [place]     Social Determinants of Health     Financial Resource Strain: Low Risk      Difficulty of Paying Living Expenses: Not very hard   Food Insecurity: No Food Insecurity     Worried About Running Out of Food in the Last Year: Never true     Ran Out of Food in the Last Year: Never true   Transportation Needs: No Transportation Needs     Lack of Transportation (Medical): No     Lack of Transportation (Non-Medical): No   Physical Activity: Not on file   Stress: Not on file   Social Connections: Not on file   Intimate Partner Violence: Not on file   Housing Stability: Not on file     Family History   Problem Relation Age of Onset     Prostate Cancer Maternal Grandfather      Substance Abuse Maternal Grandfather         Alcohol     Colon Cancer Father 60     Pancreatic Cancer Father 60     Prostate Cancer Father      Colorectal Cancer Father      Macular Degeneration Father      Cancer Father      Glaucoma Father      Skin Cancer Father      Colorectal Cancer Maternal Grandmother      Cancer Maternal Grandmother      Substance Abuse Maternal Grandmother         Alcohol     Colorectal Cancer Paternal Grandmother      Cancer Mother      Diabetes Mother          3/2016     Cerebrovascular Disease Mother         Passed away in Feb of this year, 80 years old.     Thyroid Disease Mother      Depression Mother      Asthma Sister         Had since birth     Thyroid Disease Sister      Depression Sister      Liver Disease No family hx of       Melanoma No family hx of      Lab Results   Component Value Date    A1C 6.8 07/13/2021    A1C 6.0 12/30/2020    A1C 6.3 06/13/2020    A1C 6.6 08/08/2018    A1C 6.5 06/09/2017    A1C 7.8 10/25/2016     Lab Results   Component Value Date    WBC 5.6 11/26/2021    WBC 4.4 06/28/2021     Lab Results   Component Value Date    RBC 2.98 11/26/2021    RBC 2.35 06/28/2021     Lab Results   Component Value Date    HGB 9.3 11/30/2021    HGB 7.3 06/28/2021     Lab Results   Component Value Date    HCT 29.4 11/30/2021    HCT 22.6 06/28/2021     No components found for: MCT  Lab Results   Component Value Date    MCV 97 11/26/2021    MCV 96 06/28/2021     Lab Results   Component Value Date    MCH 30.5 11/26/2021    MCH 31.1 06/28/2021     Lab Results   Component Value Date    MCHC 31.5 11/26/2021    MCHC 32.3 06/28/2021     Lab Results   Component Value Date    RDW 13.7 11/26/2021    RDW 15.1 06/28/2021     Lab Results   Component Value Date     11/26/2021     06/28/2021     Last Comprehensive Metabolic Panel:  Sodium   Date Value Ref Range Status   11/26/2021 135 133 - 144 mmol/L Final   06/28/2021 137 133 - 144 mmol/L Final     Potassium   Date Value Ref Range Status   11/26/2021 4.4 3.4 - 5.3 mmol/L Final   06/28/2021 4.6 3.4 - 5.3 mmol/L Final     Chloride   Date Value Ref Range Status   11/26/2021 104 94 - 109 mmol/L Final   06/28/2021 107 94 - 109 mmol/L Final     Carbon Dioxide   Date Value Ref Range Status   06/28/2021 25 20 - 32 mmol/L Final     Carbon Dioxide (CO2)   Date Value Ref Range Status   11/26/2021 23 20 - 32 mmol/L Final     Anion Gap   Date Value Ref Range Status   11/26/2021 8 3 - 14 mmol/L Final   06/28/2021 5 3 - 14 mmol/L Final     Glucose   Date Value Ref Range Status   11/26/2021 136 (H) 70 - 99 mg/dL Final   06/28/2021 208 (H) 70 - 99 mg/dL Final     Urea Nitrogen   Date Value Ref Range Status   11/26/2021 37 (H) 7 - 30 mg/dL Final   06/28/2021 33 (H) 7 - 30 mg/dL Final     Creatinine    Date Value Ref Range Status   11/26/2021 1.46 (H) 0.66 - 1.25 mg/dL Final   06/28/2021 1.62 (H) 0.66 - 1.25 mg/dL Final     Creatinine POCT   Date Value Ref Range Status   11/26/2021 1.6 (H) 0.7 - 1.3 mg/dL Final     GFR Estimate   Date Value Ref Range Status   11/26/2021 53 (L) >60 mL/min/1.73m2 Final     Comment:     As of July 11, 2021, eGFR is calculated by the CKD-EPI creatinine equation, without race adjustment. eGFR can be influenced by muscle mass, exercise, and diet. The reported eGFR is an estimation only and is only applicable if the renal function is stable.   06/28/2021 46 (L) >60 mL/min/[1.73_m2] Final     Comment:     Non  GFR Calc  Starting 12/18/2018, serum creatinine based estimated GFR (eGFR) will be   calculated using the Chronic Kidney Disease Epidemiology Collaboration   (CKD-EPI) equation.       GFR, ESTIMATED POCT   Date Value Ref Range Status   11/26/2021 47 (L) >60 mL/min/1.73m2 Final     Calcium   Date Value Ref Range Status   11/26/2021 8.8 8.5 - 10.1 mg/dL Final   06/28/2021 9.1 8.5 - 10.1 mg/dL Final     Lab Results   Component Value Date    AST 13 11/26/2021    AST 9 06/28/2021     Lab Results   Component Value Date    ALT 25 11/26/2021    ALT 20 06/28/2021     No results found for: BILICONJ   Lab Results   Component Value Date    BILITOTAL 0.4 11/26/2021    BILITOTAL 0.5 06/28/2021     Lab Results   Component Value Date    ALBUMIN 3.8 11/26/2021    ALBUMIN 4.0 06/28/2021     Lab Results   Component Value Date    PROTTOTAL 6.9 11/26/2021    PROTTOTAL 7.4 06/28/2021      Lab Results   Component Value Date    ALKPHOS 79 11/26/2021    ALKPHOS 98 06/28/2021     SUBJECTIVE FINDINGS:  A 55-year-old male returns to clinic for foot check.  He relates he finished physical therapy for his heart.  Relates he has numbness, tingling and neuropathy in his feet.  Relates no ulcers or sores since we have seen him last.  Relates he needs his toenails trimmed.  Relates he is using  Loprox cream intermittently.  No other specific relieving or aggravating factors.  He wears diabetic shoes.       OBJECTIVE FINDINGS:  DP and PT 2/4 bilaterally.  He has dorsally contracted digits 2-5 bilaterally.  He has incurvated nails with some thickening and dystrophy 1-5 bilaterally to differing degrees.  There is no erythema, no drainage, no odor, no calor bilaterally.  There are no gross tendon voids bilaterally.  He has a laterally deviated hallux bilaterally.  He has some dry scaly skin on heels bilaterally.     ASSESSMENT/PLAN:  Onychauxis bilaterally.  He is diabetic with peripheral neuropathy and foot deformity.  His protective sensation is intact.  Diagnosis and treatment options discussed with him.  All the nails were reduced bilaterally upon consent.  Advised him on Loprox cream use.  Continue Diabetic shoes and excercising.  Return to clinic and see me in about 3 months.   Moderate level of medical decision making.      Again, thank you for allowing me to participate in the care of your patient.      Sincerely,    Lamin Gonzalez DPM

## 2022-01-01 NOTE — LETTER
Sonali Dub 35   Date:   10/15/2019     Name:   Kayla Davila    YOB: 1950   MRN:   [id number]       [facility]? Choco the areas on your body where you feel the described sensations.   Use the appropriate sy Yes

## 2022-01-22 NOTE — CM/SW NOTE
"PICC Line Insertion Procedure Note  Pt. Name: Paige Mari  MRN:        [id number]    Procedure: Insertion of a  triple Lumen  5 fr  Bard SOLO (valved) Power PICC, Lot number [id number]    Indications: Vascular access    Contraindications : none    Procedure Details   Patient identified with 2 identifiers and \"Time Out\" conducted.  .     Central line insertion bundle followed: hand hygeine performed prior to procedure, site cleansed with cholraprep, hat, mask, sterile gloves,sterile gown worn, patient draped with maximum barrier head to toe drape, sterile field maintained.    The vein was assessed and found to be compressible and of adequate size. 1 ml 1% Lidocaine administered sq to the insertion site. A 5 Fr PICC was inserted into the basilic vein of the right arm with ultrasound guidance. 1 attempt(s) required to access vein.   Catheter threaded without difficulty. Good blood return noted.    Modified Seldinger Technique used for insertion.    The 8 sharps that are included in the PICC insertion kit were accounted for and disposed of in the sharps container prior to breakdown of the sterile field.    Catheter secured with Statlock, biopatch and Tegaderm dressing applied.    Findings:  Total catheter length  37 cm, with 0 cm exposed. Mid upper arm circumference is 35 cm. Catheter was flushed with 30 cc NS. Patient  tolerated procedure well.    Tip placement verified by 3CG technology . Tip placement in the SVC/RA junction.    CLABSI prevention brochure left at bedside.    Patient's primary RN notified PICC is ready for use.    Comments:          Adolph CALDERÓNN,RN,[facility] Vascular Access        " 09/14/19 1300   CM/SW Referral Data   Referral Source Physician   Reason for Referral Discharge planning   Informant Children     SW received order that pt is current with [facility]. SW spoke to daughter Justa Alvarado. She confirms above.   Pt lives with

## 2022-04-28 NOTE — TELEPHONE ENCOUNTER
No retinal detachment or retinal tear noted. Patient has already scheduled in-office visit tomorrow. Have you been in contact with someone who was confirmed or suspected to have Coronavirus/COVID? NO  Have you had a COVID-19 viral test in the last 14 days?  Neg 9/12  Do you have any of the followi normal...

## 2022-05-18 PROBLEM — Z51.5 HOSPICE CARE: Status: ACTIVE | Noted: 2022-01-01

## 2022-05-18 PROBLEM — I69.359 HEMIPLEGIA AND HEMIPARESIS FOLLOWING CEREBRAL INFARCTION AFFECTING UNSPECIFIED SIDE (HCC): Status: ACTIVE | Noted: 2019-03-02

## 2022-05-18 PROBLEM — I63.00 CEREBRAL INFARCTION DUE TO THROMBOSIS OF PRECEREBRAL ARTERY (HCC): Status: ACTIVE | Noted: 2022-01-01

## 2022-05-18 PROBLEM — J96.01 ACUTE RESPIRATORY FAILURE WITH HYPOXIA (HCC): Status: ACTIVE | Noted: 2019-03-02

## 2022-05-20 NOTE — TELEPHONE ENCOUNTER
CC 6 month follow up visit    HPI  67 y/o female PMH hypertension, hyperlipidemia,  Osteopenia (see dexascan 2019), tobacco abuse disorder ( formerly smoked 1/3 PPD  For 10 years, now down to 7 per day,no smokeless tobacco products, she nicotine  Patches she will start), obesity, leg edema(improved)    Hypertension  No chest pain, headache, sob, leg edema, stroke, kidney disease       Osteopenia last dexascan  She is exercising  Narrative   EXAMINATION:   BONE DENSITOMETRY       6/18/2019 10:08 am       TECHNIQUE:   A bone density DEXA scan was performed of the lumbar spine and bilateral hips   on a [employer] system.       COMPARISON:   None.       HISTORY:   ORDERING SYSTEM PROVIDED HISTORY: Visit for screening mammogram       FINDINGS:   LUMBAR SPINE:       The bone mineral density in the lumbar spine including the L1 through L4   levels is measured at 0.835 g/cm2, which corresponds to a T-score of -1.90   and a Z-score of 0.20.  This is within the osteopenia range by WHO criteria.       LEFT HIP:       The bone mineral density in the total hip is measured at 0.9640 g/cm2   corresponding to a T-score of 0.2 and a Z-score of 1.7.  This is within the   normal range by [facility] criteria.       The bone mineral density of the femoral neck is measured at 0.7270 g/cm2   corresponding to a T-score of -1.10 and a Z-score of 0.7.  This is within the   osteopenia range by WHO criteria.       RIGHT HIP:       The bone mineral density in the total hip is measured at 0.9050 g/cm2   corresponding to a T-score of -0.30 and a Z-score of 1.20.  This is within   the normal range by [facility] criteria.       The bone mineral density of the femoral neck is measured at 0.6770 g/cm2   corresponding to a T-score of -1.5 and a Z-score of 0.30.  This is within the   osteopenia range by WHO criteria.       WHO fracture risk assessment tool (FRAX) projects a 10-year fracture risk of   a major osteoporotic fracture at 9.4 % and hip fracture at 2.1 %.     LM with [facility] to confirm removable legs rests      Spoke with Alexandr Toure cell [phone] to review DME physical therapy was requesting.    Notified [facility] DME was approved for:     -Bedside commode              PT requesting commode with 3 in The patient's 10-year fracture risk is increased if untreated.  Fracture   probability may be lower if the patient has received treatment. Eula Mitchell may   underestimate fracture probability in patients who have impaired functional   status from rheumatoid arthritis, history of frequent falls, history of   multiple fractures, severe vertebral fractures, parental history of non-hip   fragility fractures, glucocorticoid doses in excess of 7.5 mg/day or frequent   use, high dose inhaled glucocorticoids, and if the T-score of the lumbar   spine is > 1 SD lower than the femoral neck.  Clinical judgment and/or   patient preferences may indicate treatment for people with 10 year fracture   probabilities above or below these levels.           Impression   Osteopenia by WHO criteria. Vit d def  No bone pain  No treatment  Ck level    Leg edema improved  Obesity  Trying to lose wt  Wt Readings from Last 3 Encounters:   05/20/22 211 lb (95.7 kg)   11/19/21 202 lb (91.6 kg)   11/19/21 202 lb (91.6 kg)       Physical Exam  Constitutional:       General: She is not in acute distress. Appearance: She is well-developed. She is not diaphoretic. HENT:      Head: Normocephalic and atraumatic. Right Ear: External ear normal.      Left Ear: External ear normal.      Nose: Nose normal.      Mouth/Throat:      Pharynx: No oropharyngeal exudate. Eyes:      General: No scleral icterus. Left eye: No discharge. Conjunctiva/sclera: Conjunctivae normal.      Pupils: Pupils are equal, round, and reactive to light. Neck:      Thyroid: No thyromegaly. Vascular: No JVD. Trachea: No tracheal deviation. Cardiovascular:      Rate and Rhythm: Normal rate and regular rhythm. Heart sounds: Normal heart sounds. No murmur heard. No friction rub. No gallop. Pulmonary:      Effort: Pulmonary effort is normal. No respiratory distress. Breath sounds: Normal breath sounds. No stridor. No wheezing or rales. Chest:      Chest wall: No tenderness. Abdominal:      General: Bowel sounds are normal. There is no distension. Palpations: Abdomen is soft. There is no mass. Tenderness: There is no abdominal tenderness. There is no guarding or rebound. Musculoskeletal:         General: No tenderness. Normal range of motion. Cervical back: Normal range of motion and neck supple. Comments: Leg edema improved     Lymphadenopathy:      Cervical: No cervical adenopathy. Skin:     General: Skin is warm and dry. Coloration: Skin is not pale. Findings: No erythema or rash. Neurological:      Mental Status: She is alert and oriented to person, place, and time. Cranial Nerves: No cranial nerve deficit. Coordination: Coordination normal.      Deep Tendon Reflexes: Reflexes are normal and symmetric. Reflexes normal.   Psychiatric:         Behavior: Behavior normal.         Thought Content: Thought content normal.         Judgment: Judgment normal.         Cassie Ortez was seen today for 6 month follow-up. Diagnoses and all orders for this visit:      Essential hypertension    bp at goal  < 140/90  meds refill, labs  -     dilTIAZem (CARDIZEM CD) 240 MG extended release capsule; TAKE ONE CAPSULE BY MOUTH DAILY  -     hydroCHLOROthiazide (HYDRODIURIL) 25 MG tablet; TAKE ONE TABLET BY MOUTH DAILY  -     Basic Metabolic Panel; Future  -     AST; Future  -     Lipid, Fasting; Future    Pure hypercholesterolemia  On statin for ascvd prevention  No leg cramps, lab, refill statin  -     atorvastatin (LIPITOR) 20 MG tablet; TAKE ONE TABLET BY MOUTH ONCE NIGHTLY  -     Basic Metabolic Panel; Future  -     AST; Future  -     Lipid, Fasting; Future    Vitamin D deficiency  Has OP  Ck level replace as needed  -     Vitamin D 25 Hydroxy; Future    Severe obesity (BMI 35.0-39. 9) with comorbidity ([address]  Low molly diet  exercise  Osteopenia, unspecified location  Exercise, vit d as needed  Calcium,  - Calcium Carbonate-Vit D-Min (CALCIUM 1200) 1060-3788 MG-UNIT CHEW; Take by mouth one a day    Edema, unspecified type  Mostly resolved  No chf sxs  Cont, ck renal for low potassium   -     furosemide (LASIX) 20 MG tablet; TAKE ONE TABLET BY MOUTH DAILY          5/23/2022    The 10-year ASCVD risk score (Vinny Sims, et al., 2013) is: 25.1%    Values used to calculate the score:      Age: 66 years      Sex: Female      Is Non- : Yes      Diabetic: No      Tobacco smoker: Yes      Systolic Blood Pressure: 217 mmHg      Is BP treated: Yes      HDL Cholesterol: 64 mg/dL      Total Cholesterol: 224 mg/dL      Restart atorvastatin

## 2022-06-24 PROBLEM — Z51.5 HOSPICE CARE PATIENT: Status: ACTIVE | Noted: 2022-01-01

## 2022-11-03 ENCOUNTER — TELEPHONE (OUTPATIENT)
Dept: FAMILY MEDICINE CLINIC | Facility: CLINIC | Age: 72
End: 2022-11-03

## 2023-04-11 NOTE — PROGRESS NOTES
Amari Panda  [id number] was seen for Continuous Glucose Sensor Follow-up Visit:    Date: 1/4/2019  Start time: 9:30am End time: 10am      Sensor Type: Amaury Torres Personal    Site Assessment: left arm and it is free of redness and swelling     Reviewed Posterior Auricular Interpolation Flap Text: A decision was made to reconstruct the defect utilizing an interpolation axial flap and a staged reconstruction.  A telfa template was made of the defect.  This telfa template was then used to outline the posterior auricular interpolation flap.  The donor area for the pedicle flap was then injected with anesthesia.  The flap was excised through the skin and subcutaneous tissue down to the layer of the underlying musculature.  The pedicle flap was carefully excised within this deep plane to maintain its blood supply.  The edges of the donor site were undermined.   The donor site was closed in a primary fashion.  The pedicle was then rotated into position and sutured.  Once the tube was sutured into place, adequate blood supply was confirmed with blanching and refill.  The pedicle was then wrapped with xeroform gauze and dressed appropriately with a telfa and gauze bandage to ensure continued blood supply and protect the attached pedicle.

## 2023-10-19 NOTE — TELEPHONE ENCOUNTER
Orders received signed and faxed back to [facility].   Copy sent to scanning on the discharge service for the patient. I have reviewed and made amendments to the documentation where necessary.

## 2023-10-19 NOTE — TELEPHONE ENCOUNTER
Pt requesting refill of   Requested Prescriptions     Pending Prescriptions Disp Refills   • omeprazole 20 MG Oral Capsule Delayed Release [Pharmacy Med Name: OMEPRAZOLE 20MG CAPSULES] 90 capsule 0     Sig: TAKE 1 CAPSULE BY MOUTH EVERY MORNING 45 MINUTES 

## 2024-06-11 NOTE — TELEPHONE ENCOUNTER
----- Message from Minerva Jackody sent at 6/11/2024 12:08 PM CDT -----  Regarding: call back  Type: Patient Call Back    Who called: [employer]      What is the request in detail: requesting call to verify details of disability paperwork sent in by clinic     Can the clinic reply by [facility]?no    Would the patient rather a call back or a response via [facility]? call    Best call back number: [phone]    Additional Information:   PT called this morning and spoke to psr and scheduled f/u appt    Future Appointments   Date Time Provider Edmond Warren   2/26/2019 12:20 PM Janet Calle DO [facility] [place]   3/18/2019 12:40 PM Janet Calle DO [facility] [place]   4/16/2019 11:00 A

## (undated) DEVICE — CYSTO CDS-LF: Brand: MEDLINE INDUSTRIES, INC.

## (undated) DEVICE — Device

## (undated) DEVICE — SOL H2O 3000ML IRRIG

## (undated) DEVICE — TIGERTAIL 5F FLXTIP 70CM

## (undated) DEVICE — SOL H2O 1000ML BTL

## (undated) DEVICE — 3M™ TEGADERM™ TRANSPARENT FILM DRESSING, 1626W, 4 IN X 4-3/4 IN (10 CM X 12 CM), 50 EACH/CARTON, 4 CARTON/CASE: Brand: 3M™ TEGADERM™

## (undated) DEVICE — PLASTC TOOMEY SYRNG DISP

## (undated) DEVICE — KENDALL SCD EXPRESS SLEEVES, KNEE LENGTH, MEDIUM: Brand: KENDALL SCD

## (undated) DEVICE — CATH SECURING DEVICE STATLOCK

## (undated) DEVICE — BAG DRAIN INFECTION CNTRL 2000

## (undated) DEVICE — NITINOL WIRE STR 038

## (undated) DEVICE — GAMMEX® PI HYBRID SIZE 7, STERILE POWDER-FREE SURGICAL GLOVE, POLYISOPRENE AND NEOPRENE BLEND: Brand: GAMMEX

## (undated) DEVICE — Device: Brand: JELCO

## (undated) DEVICE — SEAL Y BIOPSY PORT P6R SCOPE

## (undated) NOTE — LETTER
Date: 12/28/2020    Patient Name: Jovanni Brooks   [address]          To Whom it may concern: The above patient has been under my care at [facility] for treatment of several medical conditions.       He

## (undated) NOTE — IP AVS SNAPSHOT
[address]            (For Outpatient Use Only) Initial Admit Date: 9/13/2019   Inpt/Obs Admit Date: Inpt: N/A / Obs: 09/13/19   Discharge Date:    Meredith Moraes:  [de-identified]   MRN: [de-identified]   CSN: [id number]   CEID: [id number] Hospital Account Financial Class: Medicare Advantage    September 15, 2019

## (undated) NOTE — LETTER
01/28/20        Ronel Mohan  [address]      Dear Kings Marquez records indicate that you have outstanding lab work and or testing that was ordered for you and has not yet been completed:  Orders Placed This Encou

## (undated) NOTE — LETTER
09/04/20        Enrique Thomas  [address]      Dear Mango Tamez records indicate that you have outstanding lab work and or testing that was ordered for you and has not yet been completed:        INSURE FECAL GLOBI

## (undated) NOTE — ED AVS SNAPSHOT
Kelley Cullen   MRN: [id number]    Department:  [facility] Emergency Department   Date of Visit:  1/13/2020           Disclosure     Insurance plans vary and the physician(s) referred by the ER may not be covered by your plan.  Please contact your tell this physician (or your personal doctor if your instructions are to return to your personal doctor) about any new or lasting problems. The primary care or specialist physician will see patients referred from the [facility] Emergency Department.  Ricky Ayoub

## (undated) NOTE — LETTER
06/12/20        Kelley Cullen  [address] [place] [address] Michelle registros indican que tiene análisis clínicos pendientes y/o pruebas que se ordenaron en noel nombre que no se mckeon completado.     Para b

## (undated) NOTE — LETTER
February 26, 2020      [address]        Dear Jefferson Henriquez:    We have made multiple attempts to reach you to discuss symptoms you were having.  Please contact our office at your earliest convenience at [phone]

## (undated) NOTE — LETTER
10/08/20      Nicholas Muñoz  [address]      Dear Reinaldo Redd records indicate that you have outstanding lab work and or testing that was ordered for you and has not yet been completed:        INSURE FECAL GLOBIN

## (undated) NOTE — ED AVS SNAPSHOT
Keturah Jesus   MRN: [id number]    Department:  [facility] Emergency Department   Date of Visit:  10/21/2019           Disclosure     Insurance plans vary and the physician(s) referred by the ER may not be covered by your plan.  Please contact you tell this physician (or your personal doctor if your instructions are to return to your personal doctor) about any new or lasting problems. The primary care or specialist physician will see patients referred from the [facility] Emergency Department.  Suzy Joseph

## (undated) NOTE — ED AVS SNAPSHOT
Beto Hooper   MRN: [id number]    Department:  [facility] Emergency Department   Date of Visit:  4/19/2019           Disclosure     Insurance plans vary and the physician(s) referred by the ER may not be covered by your plan.  Please contact your tell this physician (or your personal doctor if your instructions are to return to your personal doctor) about any new or lasting problems. The primary care or specialist physician will see patients referred from the [facility] Emergency Department.  Ricky Ayoub

## (undated) NOTE — ED AVS SNAPSHOT
Radha García   MRN: [id number]    Department:  [facility] Emergency Department   Date of Visit:  9/24/2019           Disclosure     Insurance plans vary and the physician(s) referred by the ER may not be covered by your plan.  Please contact your tell this physician (or your personal doctor if your instructions are to return to your personal doctor) about any new or lasting problems. The primary care or specialist physician will see patients referred from the [facility] Emergency Department.  Adalid Zelaya

## (undated) NOTE — IP AVS SNAPSHOT
[address]            (For Outpatient Use Only) Initial Admit Date: 9/12/2020   Inpt/Obs Admit Date: Inpt: N/A / Obs: 09/12/20   Discharge Date:    Nolan Binet:  [de-identified]   MRN: [de-identified]   CSN: [id number]   CEID: [id number] [employer]   Payor:  Plan:    Group Number:  Insurance Type:    Subscriber Name:  Subscriber :    Subscriber ID:  Pt Rel to Subscriber:    Hospital Account Financial Class: Medicare Advantage    September 15, 2020

## (undated) NOTE — LETTER
ED [facility], [address], [address] [address]  [phone]            February 12, 2019      Keturah Jesus  [address]      Dear Cortez Sim:    Our office has been trying to contact you to [employer]

## (undated) NOTE — ED AVS SNAPSHOT
Gavin Rene   MRN: [id number]    Department:  [facility] Emergency Department   Date of Visit:  11/17/2019           Disclosure     Insurance plans vary and the physician(s) referred by the ER may not be covered by your plan.  Please contact you tell this physician (or your personal doctor if your instructions are to return to your personal doctor) about any new or lasting problems. The primary care or specialist physician will see patients referred from the [facility] Emergency Department.  Brock Lovelace

## (undated) NOTE — ED AVS SNAPSHOT
Peter Méndez   MRN: [id number]    Department:  [facility] Emergency Department   Date of Visit:  9/30/2019           Disclosure     Insurance plans vary and the physician(s) referred by the ER may not be covered by your plan.  Please contact your tell this physician (or your personal doctor if your instructions are to return to your personal doctor) about any new or lasting problems. The primary care or specialist physician will see patients referred from the [facility] Emergency Department.  Deepa Creda

## (undated) NOTE — ED AVS SNAPSHOT
Keturah Jesus   MRN: [id number]    Department:  [facility] Emergency Department   Date of Visit:  2/23/2019           Disclosure     Insurance plans vary and the physician(s) referred by the ER may not be covered by your plan.  Please contact your tell this physician (or your personal doctor if your instructions are to return to your personal doctor) about any new or lasting problems. The primary care or specialist physician will see patients referred from the [facility] Emergency Department.  Wilton Velarde

## (undated) NOTE — Clinical Note
Pt currently does not have an HFU appt. An appt was offered to dtr for the pt however was declined. TE was sent to . Dtr, [place], stated the pt is doing good since d/c. Pt has the complex hicks catheter in place and has seen urology since d/c.  Pt is checki

## (undated) NOTE — LETTER
07/29/20  [place] [place] [address]      Dear Anna Juanjose Cisnerosta,       We have made multiple attempts to reach you regarding your refills without success. Please call the office at your earliest convenience. Thank you!

## (undated) NOTE — ED AVS SNAPSHOT
Royal Merino   MRN: [id number]    Department:  [facility] Emergency Department   Date of Visit:  9/7/2017           Disclosure     Insurance plans vary and the physician(s) referred by the ER may not be covered by your plan.  Please contact your If you have been prescribed any medication(s), please fill your prescription right away and begin taking the medication(s) as directed    If the emergency physician has read X-rays, these will be re-interpreted by a radiologist.  If there is a significant

## (undated) NOTE — ED AVS SNAPSHOT
Domenic Hicks   MRN: [id number]    Department:  [facility] Emergency Department   Date of Visit:  9/21/2019           Disclosure     Insurance plans vary and the physician(s) referred by the ER may not be covered by your plan.  Please contact your tell this physician (or your personal doctor if your instructions are to return to your personal doctor) about any new or lasting problems. The primary care or specialist physician will see patients referred from the [facility] Emergency Department.  Urmila Kumar

## (undated) NOTE — LETTER
Date: 11/9/2018    Patient Name: Tamanna Patel          To Whom it may concern: The above patient was seen at the [facility] for treatment of a medical condition.     The patient requires 8 hours of care from a caretaker due to his wife's

## (undated) NOTE — LETTER
Carolyn Callahan [address] [facility], [address] [place] [address]    Consent for Operation  Date: 09/14/2019                                Time: _______________    1. I authorize the performance upon Denise Grigsby the following operation:  Procedure(s):  2. procedure has been videotaped, the surgeon will obtain the original videotape. The hospital will not be responsible for storage or maintenance of this tape.   7. For the purpose of advancing medical education, I consent to the admittance of observers to the STATEMENTS REQUIRING INSERTION OR COMPLETION WERE FILLED IN.     Signature of Patient:   ___________________________    When the patient is a minor or mentally incompetent to give consent:  Signature of person authorized to consent for patient: ____________ supplements, and pills I can buy without a prescription (including street drugs/illegal medications). Failure to inform my anesthesiologist about these medicines may increase my risk of anesthetic complications. iv.  If I am allergic to anything or have ha Anesthesiologist Signature     Date   Time  I have discussed the procedure and information above with the patient (or patient’s representative) and answered their questions. The patient or their representative has agreed to have anesthesia services.     ___

## (undated) NOTE — ED AVS SNAPSHOT
Sumeet Guzmán   MRN: [id number]    Department:  [facility] Emergency Department   Date of Visit:  10/24/2019           Disclosure     Insurance plans vary and the physician(s) referred by the ER may not be covered by your plan.  Please contact you tell this physician (or your personal doctor if your instructions are to return to your personal doctor) about any new or lasting problems. The primary care or specialist physician will see patients referred from the [facility] Emergency Department.  Merle Green

## (undated) NOTE — ED AVS SNAPSHOT
Israel Ford   MRN: [id number]    Department:  [facility] Emergency Department   Date of Visit:  8/21/2019           Disclosure     Insurance plans vary and the physician(s) referred by the ER may not be covered by your plan.  Please contact your tell this physician (or your personal doctor if your instructions are to return to your personal doctor) about any new or lasting problems. The primary care or specialist physician will see patients referred from the [facility] Emergency Department.  Nader Arzate

## (undated) NOTE — IP AVS SNAPSHOT
Patient Demographics     Address  [address] [place] [address] Phone  [phone] [facility])  [phone] (Work)  [phone] [facility]) *Preferred* E-mail Address  [email or web address]      Emergency Contact(s)     Name Relation Home Work Mo Next dose due:  Next dose due when needed      Take 1 capsule (500 mg total) by mouth every 6 (six) hours as needed for Pain (max use 6 tabs in 24 hours or 4000mg).    Rupali Smith, DO         amLODIPine Besylate 5 MG Tabs  Commonly known as:  NORVASC  Nex Next dose due:  Next dose due tomorrow Wednesday, 9/16 at 9 am      Take 1 tablet (325 mg total) by mouth daily with breakfast.   Lupe Melendez, DO         gabapentin 300 MG Caps  Commonly known as:  NEURONTIN  Next dose due:  Next dose due tonight Tuesday Stop taking on:  September 21, 2020   Rosa Gregg MD         risperiDONE 0.5 MG Tabs  Commonly known as:  RISPERDAL      TK 1 T PO BID          Sertraline HCl 100 MG Tabs  Commonly known as:  ZOLOFT  Next dose due:  Next dose due tomorrow Wednesday, 9/16 [id number] risperiDONE (RISPERDAL) tab 0.5 mg 09/15/20 0924 Given      [id number] tamsulosin HCl (FLOMAX) cap 0.4 mg 09/15/20 0924 Given      [id number] traZODone HCl (DESYREL) tab 150 mg 09/14/20 2009 Given            LEFT UPPER ARM     Order ID Medication [id number], Result status: Final result   Ordering provider:  Lou Murphy MD  09/14/20 5733 Resulting lab:  [facility])    Specimen Information    Type Source Collected On   Blood — 09/15/20 0645          Components    Compone Blood Culture FREQ X 2 [[id number]] Collected:  09/12/20 1201    Order Status:  Completed Lab Status:  Preliminary result Updated:  09/15/20 1300    Specimen:  Blood,peripheral      Blood Culture Result No Growth 3 Days    Blood Culture FREQ X 2 [[id number] No family by bedside. Pt unable to provide history.      Past Medical History:  Past Medical History:   Diagnosis Date   • AAA (abdominal aortic aneurysm) without rupture (HCC)    • Abnormal posture    • Acute pancreatitis 1/26/2015   • Acute respiratory fa • Unstable angina ([facility] 4/22/2015   • Urinary tract infection associated with indwelling urethral catheter ([facility] 6/25/2019 8/4/2020 Hx of chonic prostatitis. Catheter removed. Incontinent.   No fever, no dysuria, U cx w/ 10 x 5 Staph agent, on Ancef oliveira nightly., Disp: 90 tablet, Rfl: 0  metFORMIN HCl  MG Oral Tablet 24 Hr, Take 1 tablet (750 mg total) by mouth 2 (two) times daily with meals. , Disp: 180 tablet, Rfl: 1  tamsulosin (FLOMAX) cap, Take 1 capsule (0.4 mg total) by mouth daily. , Disp: 90 Ferrous Sulfate (FERROUSUL) 325 (65 Fe) MG Oral Tab, Take 1 tablet (325 mg total) by mouth daily with breakfast., Disp: 90 tablet, Rfl: 3  Acetaminophen 500 MG Oral Cap, Take 1 capsule (500 mg total) by mouth every 6 (six) hours as needed for Pain (max use Chest and Back: No tenderness or deformity. Abdomen: Soft, nontender, nondistended. Positive bowel sounds. Neurologic: left hemiparesis, dysarthria   Extremities: No edema or cyanosis. Integument: No rashes or lesions.        Diagnostic Data:      Labs Author:  Naima Goel MD Service:  Raul Simmons Type:  Physician    Filed:  9/12/2020  2:30 PM Date of Service:  9/12/2020  1:20 PM Status:  Signed    :  Naima Goel MD (Physician)    Related Notes:  Addendum by Naima Goel MD (Physician) filed • Hammer toes of both feet 4/13/2018   • Hemiplegia and hemiparesis following cerebral infarction affecting unspecified side (HCC)    • High grade prostatic intraepithelial neoplasia 10/27/2016    - Aug 29, 2016 Cystoscopy, Transurethral resection of bladd 10.00 pack-year smoking history. He has never used smokeless tobacco. He reports that he does not drink alcohol or use drugs.     Family History:   Family History   Problem Relation Age of Onset   • Diabetes Sister    • Heart Disorder Sister    • Diabetes B Glucose Blood (ACCU-CHEK GUIDE) In Vitro Strip, TEST THREE TIMES DAILY, Disp: 100 strip, Rfl: 11  traZODone HCl 150 MG Oral Tab, TK 1 T PO HS, Disp: , Rfl:   risperiDONE 0.5 MG Oral Tab, TK 1 T PO BID, Disp: , Rfl:   ofloxacin 0.3 % Ophthalmic Solution, 1 Insulin Pen Needle (BD PEN NEEDLE KEKE U/F) 32G X 4 MM Does not apply Misc, 4 injections daily, Disp: 200 Box, Rfl: 1  ACCU-CHEK FASTCLIX LANCETS Does not apply Misc, 3 x daily, Disp: 1 Box, Rfl: 11  EPINEPHrine (EPIPEN 2-GIOVANI) 0.3 MG/0.3ML Injection Soluti 5. SLP eval   6. If does not pass swallow eval may need Gtube placement   2. Elevated liver enzymes   1. Unclear etiology   2. Hold statin   3. Hep panel   4. Consider US abd   3. DM 2  1. Hyperglycemia protocol  2. ISS  4. Microcytic anemia   5.  Hx of GIB • AAA (abdominal aortic aneurysm) without rupture (HCC)    • Abnormal posture    • Acute pancreatitis 1/26/2015   • Acute respiratory failure ([facility] 3/2/2019   • Acute respiratory failure with hypoxia [facility])    • Angioedema 6/1/2015    Overview:  felt to be 8/4/2020 Hx of chonic prostatitis. Catheter removed. Incontinent. No fever, no dysuria, U cx w/ 10 x 5 Staph agent, on Ancef transition to oral regimen for another 2 days.    • Vertigo 1/26/2015       Past Surgical History  Past Surgical History:   Proced WEIGHT BEARING RESTRICTION  Weight Bearing Restriction: None(Simultaneous filing. User may not have seen previous data.)                PAIN ASSESSMENT  Ratin  Location: back  Management Techniques: Activity promotion; Body mechanics;Breathing techniq on R, repositioned with pillows to sit upright on chair. Daughter came and discussed D/C plan, both pt and daughter verbalized understanding and agreement. RN made aware of recommendation, session and to use lift for safe transfer.     Patient End of Sessi Signed    :  Josh Griggs OT (Occupational Therapist)       OCCUPATIONAL THERAPY QUICK EVALUATION - INPATIENT    Room Number: 3874/5267-C  Evaluation Date: 9/13/2020     Type of Evaluation: Quick Eval  Presenting Problem: dysphagia    Physicia • High grade prostatic intraepithelial neoplasia 10/27/2016    - Aug 29, 2016 Cystoscopy, Transurethral resection of bladder tumor - Oct 13, 2016 Cystoscopy/dilation - March 6, 2017 Cystoscopy and TURP   • Hypo-osmolality and hyponatremia    • Hyponatremia may not have seen previous data.)  Lives With: Daughter(Simultaneous filing. User may not have seen previous data.)          Other Equipment: (powered wheelchair)          Drives: No(Simultaneous filing.  User may not have seen previous data.)  Patient Regu -   Taking care of personal grooming such as brushing teeth?: A Lot  -   Eating meals?: A Lot    AM-PAC Score:  Score: 9  Approx Degree of Impairment: 79.59%  Standardized Score (AM-PAC Scale): 25.33  CMS Modifier (G-Code): CL    FUNCTIONAL TRANSFER ASSESS OT Discharge Recommendations: Home with home health PT/OT;24 hour care/supervision       PLAN   Patient has been evaluated and presents with no skilled Occupational Therapy needs at this time. Patient discharged from Occupational Therapy services.   Please Past Medical History:   Diagnosis Date   • AAA (abdominal aortic aneurysm) without rupture (HCC)    • Abnormal posture    • Acute pancreatitis 1/26/2015   • Acute respiratory failure ([facility] 3/2/2019   • Acute respiratory failure with hypoxia (HCC)    • Ana Maria • Urinary tract infection associated with indwelling urethral catheter ([facility] 6/25/2019 8/4/2020 Hx of chonic prostatitis. Catheter removed. Incontinent.   No fever, no dysuria, U cx w/ 10 x 5 Staph agent, on Ancef transition to oral regimen for another 2 Bolus Propulsion (VFSS - Nectar Thick Liquids): Impaired  Retention (VFSS - Nectar Thick Liquids):  Intact  Residue Severity, Location: Mild/Mod;Pyriform Sinuses  Laryngeal Penetration: None  Tracheal Aspiration: None     PUREE  Oral Phase of Swallow (VFSS implementation of aspiration precautions and swallow strategies independently over 2-3 session(s). Not Addressed     PLAN: Con't SLP intervention for diet texture analysis and Pt/family education/training.      EDUCATION/INSTRUCTION  Reviewed results and with daughter and has been coughing while drinking the past 2 days. CXR negative. During this visit with ST, daughter reported pt drinks nectar thick liquids at home, which was recommended by pt's doctor[JOSEPH.1] since coughing started[JOSEPH.2].  Pt has not been Discharge Recommendations/Plan: Undetermined    HISTORY   MEDICAL HISTORY  Reason for Referral: R/O aspiration    Problem List  Principal Problem:    Aspiration into airway, initial encounter  Active Problems:    Aspiration into airway      Past Medical Hi • Tracheostomy status ([facility]    • Type II or unspecified type diabetes mellitus without mention of complication, not stated as uncontrolled    • Unspecified essential hypertension    • Unstable angina ([facility] 4/22/2015   • Urinary tract infection associated w oropharyngeal swallow and rule-out aspiration.   New Goal     FOLLOW UP  Treatment Plan/Recommendations: Videofluoroscopic swallow study  Number of Visits to Meet Established Goals: 1  Follow Up Needed: Yes  SLP Follow-up Date: 09/14/20    Thank you for you thinks she needs to puree his food. Of note, pt is edentulous and per daughter, does not own dentures.  Daughter reports pt does \"just fine\" eating without them.     Pt self-presented multiple trials of thin liquid by cup (single swallows), nectar thick l • Acute respiratory failure with hypoxia [facility])    • Angioedema 6/1/2015    Overview:  felt to be secondary to ACE   • Anxiety    • Atherosclerotic heart disease of native coronary artery without angina pectoris    • Cholecystitis 2/17/2015   • Cognitive co • Vertigo 1/26/2015       Prior Living Situation: Home with support  Diet Prior to Admission: Regular;Nectar thick liquids(Per daughter)  Precautions: Aspiration    Patient/Family Goals:  To resume a safe oral diet    SWALLOWING HISTORY  Current Diet Consis SLP Note signed by CHUN Pierson at 9/13/2020 10:25 AM  Version 1 of 3    Author:  CHUN Pierson Service:  Rehab Author Type:  SPEECH-LANGUAGE PATHOLOGIST    Filed:  9/13/2020 10:25 AM Date of Service:  9/13/2020 10:06 AM Status:  Signed    Billy aspiration were observed, either. Of note, pt independently takes small/single sips of liquid.     Please see \"Recommendations\" section below    RECOMMENDATIONS   Diet Recommendations - Solids: Regular(Pt may choose softer foods from menu)  Diet Recommend • Hemiplegia and hemiparesis following cerebral infarction affecting unspecified side ([facility]    • High grade prostatic intraepithelial neoplasia 10/27/2016    - Aug 29, 2016 Cystoscopy, Transurethral resection of bladder tumor - Oct 13, 2016 Cystoscopy/dila Range of Motion: Within Functional Limits  Rate of Motion: Within Functional Limits    Voice Quality: Clear;Weak  Respiratory Status: Unlabored  Consistencies Trialed: Thin liquids; Nectar thick liquids;Puree;Hard solid  Method of Presentation: Self present Not on file